# Patient Record
Sex: MALE | Race: BLACK OR AFRICAN AMERICAN | NOT HISPANIC OR LATINO | Employment: PART TIME | URBAN - METROPOLITAN AREA
[De-identification: names, ages, dates, MRNs, and addresses within clinical notes are randomized per-mention and may not be internally consistent; named-entity substitution may affect disease eponyms.]

---

## 2017-10-16 ENCOUNTER — OFFICE VISIT (OUTPATIENT)
Dept: CARDIOLOGY | Facility: CLINIC | Age: 62
End: 2017-10-16

## 2017-10-16 VITALS
DIASTOLIC BLOOD PRESSURE: 66 MMHG | SYSTOLIC BLOOD PRESSURE: 124 MMHG | BODY MASS INDEX: 27.82 KG/M2 | HEIGHT: 65 IN | HEART RATE: 86 BPM | WEIGHT: 167 LBS

## 2017-10-16 DIAGNOSIS — I25.118 CORONARY ARTERY DISEASE OF NATIVE ARTERY OF NATIVE HEART WITH STABLE ANGINA PECTORIS (HCC): Primary | ICD-10-CM

## 2017-10-16 DIAGNOSIS — I10 ESSENTIAL HYPERTENSION: ICD-10-CM

## 2017-10-16 DIAGNOSIS — E78.5 DYSLIPIDEMIA: ICD-10-CM

## 2017-10-16 DIAGNOSIS — E11.65 TYPE 2 DIABETES MELLITUS WITH HYPERGLYCEMIA, WITHOUT LONG-TERM CURRENT USE OF INSULIN (HCC): ICD-10-CM

## 2017-10-16 PROBLEM — R94.31 ABNORMAL EKG: Status: ACTIVE | Noted: 2017-10-16

## 2017-10-16 PROCEDURE — 99214 OFFICE O/P EST MOD 30 MIN: CPT | Performed by: INTERNAL MEDICINE

## 2017-10-16 RX ORDER — NEBIVOLOL 5 MG/1
5 TABLET ORAL DAILY
COMMUNITY

## 2017-10-16 RX ORDER — GLIMEPIRIDE 4 MG/1
4 TABLET ORAL
COMMUNITY

## 2017-10-16 RX ORDER — LISINOPRIL 10 MG/1
10 TABLET ORAL DAILY
COMMUNITY
End: 2018-10-09 | Stop reason: HOSPADM

## 2017-10-16 RX ORDER — ATORVASTATIN CALCIUM 40 MG/1
40 TABLET, FILM COATED ORAL DAILY
COMMUNITY
End: 2018-10-09 | Stop reason: HOSPADM

## 2017-10-16 NOTE — PROGRESS NOTES
"        Encounter Date:10/16/2017      Patient ID: Judah Cerna is a 61 y.o. male.    Chief Complaint: Coronary Artery Disease    PROBLEM LIST:  1. Coronary artery disease:  a.  History of \"small heart attack,\" in 1999.  b. Subsequent cardiac catheterization study followed by 2 coronary stents; incomplete database.  c. Had several stress tests with his primary care physician during 1133-7442, which were reportedly unremarkable.  d.  Recurrent atypical chest pain, fall 2014.  e. Cardiolite stress test, 09/23/2014, was normal with excellent/above average exercise capacity and ejection fraction 55%.  f. Echocardiogram, 09/23/2014, showed ejection fraction 65% with mild mitral and mild  tricuspid regurgitation.   g. Now presents with recurrent chest pain (October 2017)  2. Hypertension.   3. Dyslipidemia.   4. Type 2 diabetes.   5. Remote appendectomy with partial bowel resection due to ruptured appendix.     History of Present Illness  Patient presents today for follow-up with a history of CAD and cardiac risk factors.he has been doing well except for a right sided chest pain exacerbated with exertion. Pain is relieved by rest. EKG last week with PCP was unremarkable and he was advised to see us for further evaluation.  Denies shortness of breath, leg swelling, palpitations, and syncope. Remains busy and active running daily, limited by symptoms.  States that he is used to running for a long distance and now just after half a mile he has to limit herself due to right-sided chest pain.  No pain at rest.  Had recent bronchitis which has now resolved, denies pleuritic pain.  Works as an special education educator.    No Known Allergies      Current Outpatient Prescriptions:   •  aspirin 81 MG tablet, Take 81 mg by mouth Daily., Disp: , Rfl:   •  atorvastatin (LIPITOR) 40 MG tablet, Take 40 mg by mouth Daily., Disp: , Rfl:   •  glimepiride (AMARYL) 4 MG tablet, Take 4 mg by mouth Every Morning Before Breakfast., Disp: " ", Rfl:   •  lisinopril (PRINIVIL,ZESTRIL) 10 MG tablet, Take 10 mg by mouth Daily., Disp: , Rfl:   •  metFORMIN (GLUCOPHAGE) 1000 MG tablet, Take 1,000 mg by mouth Daily With Breakfast., Disp: , Rfl:   •  nebivolol (BYSTOLIC) 5 MG tablet, Take 5 mg by mouth Daily., Disp: , Rfl:     The following portions of the patient's history were reviewed and updated as appropriate: allergies, current medications, past family history, past medical history, past social history, past surgical history and problem list.    ROS  Review of Systems   Constitution: Negative for chills, fever, weight gain and weight loss.   Cardiovascular: Negative for chest pain, claudication, dyspnea on exertion, leg swelling, orthopnea, palpitations, paroxysmal nocturnal dyspnea and syncope.        No dizziness   Gastrointestinal: Negative for abdominal pain, constipation, diarrhea, nausea and vomiting.   Genitourinary:        No urinary symptoms   Neurological:        No symptoms of stroke.   All other systems reviewed and are negative.    Objective:     Blood pressure 124/66, pulse 86, height 65\" (165.1 cm), weight 167 lb (75.8 kg).      Physical Exam  Constitutional: She appears well-developed and well-nourished.   HENT:   HEENT exam unremarkable.   Neck: Neck supple. No JVD present.   No carotid bruits.   Cardiovascular: Normal rate, regular rhythm and normal heart sounds.    No murmur heard.  2 plus symmetric pulses.   Pulmonary/Chest: Breath sounds normal. Does not exhibit tenderness.   Abdominal:   Abdomen benign.   Musculoskeletal: Does not exhibit edema.   Neurological:   Neurological exam unremarkable.   Vitals reviewed.    Lab Review:   Procedures   EKG from PCP reviewed, this shows sinus rhythm with inferior T-wave inversions/nonspecific changes.  Assessment:      Diagnosis Plan   1. Coronary artery disease of native artery of native heart with stable angina pectoris , Abnormal baseline ECG.   Stress Test With Myocardial Perfusion One Day "   2. Essential hypertension , Well controlled     3. Dyslipidemia On Lipitor     4. Type 2 diabetes mellitus with hyperglycemia, without long-term current use of insulin, Managed by PCP.        Plan:   Exercise/cartilage stress test for further evaluation.  Continue current medications.   FU in 6 weeks, sooner as needed.  Thank you for allowing us to participate in the care of your patient.     Kay Stevens MD, FACC, [facility]        Please note that portions of this note may have been completed with a voice recognition program. Efforts were made to edit the dictations, but occasionally words are mistranscribed.

## 2017-10-31 ENCOUNTER — HOSPITAL ENCOUNTER (OUTPATIENT)
Dept: CARDIOLOGY | Facility: HOSPITAL | Age: 62
Discharge: HOME | End: 2017-10-31
Attending: INTERNAL MEDICINE

## 2017-10-31 ENCOUNTER — HOSPITAL ENCOUNTER (OUTPATIENT)
Dept: CARDIOLOGY | Facility: HOSPITAL | Age: 62
Discharge: HOME | End: 2017-10-31
Attending: INTERNAL MEDICINE | Admitting: INTERNAL MEDICINE

## 2017-10-31 VITALS
WEIGHT: 166 LBS | BODY MASS INDEX: 27.66 KG/M2 | SYSTOLIC BLOOD PRESSURE: 130 MMHG | DIASTOLIC BLOOD PRESSURE: 80 MMHG | HEIGHT: 65 IN | HEART RATE: 60 BPM

## 2017-10-31 LAB
BH CV STRESS BP STAGE 1: NORMAL
BH CV STRESS BP STAGE 2: NORMAL
BH CV STRESS DURATION MIN STAGE 1: 3
BH CV STRESS DURATION MIN STAGE 2: 3
BH CV STRESS DURATION MIN STAGE 3: 3
BH CV STRESS DURATION MIN STAGE 4: 1
BH CV STRESS DURATION SEC STAGE 1: 0
BH CV STRESS DURATION SEC STAGE 2: 0
BH CV STRESS DURATION SEC STAGE 3: 0
BH CV STRESS DURATION SEC STAGE 4: 15
BH CV STRESS GRADE STAGE 1: 10
BH CV STRESS GRADE STAGE 2: 12
BH CV STRESS GRADE STAGE 3: 14
BH CV STRESS GRADE STAGE 4: 16
BH CV STRESS HR STAGE 1: 110
BH CV STRESS HR STAGE 2: 136
BH CV STRESS HR STAGE 3: 148
BH CV STRESS HR STAGE 4: 157
BH CV STRESS METS STAGE 1: 5
BH CV STRESS METS STAGE 2: 7.5
BH CV STRESS METS STAGE 3: 10
BH CV STRESS METS STAGE 4: 13.5
BH CV STRESS PROTOCOL 1: NORMAL
BH CV STRESS RECOVERY BP: NORMAL MMHG
BH CV STRESS RECOVERY HR: 80 BPM
BH CV STRESS SPEED STAGE 1: 1.7
BH CV STRESS SPEED STAGE 2: 2.5
BH CV STRESS SPEED STAGE 3: 3.4
BH CV STRESS SPEED STAGE 4: 4.2
BH CV STRESS STAGE 1: 1
BH CV STRESS STAGE 2: 2
BH CV STRESS STAGE 3: 3
BH CV STRESS STAGE 4: 4
LV EF NUC BP: 65 %
MAXIMAL PREDICTED HEART RATE: 160 BPM
PERCENT MAX PREDICTED HR: 98.13 %
STRESS BASELINE BP: NORMAL MMHG
STRESS BASELINE HR: 60 BPM
STRESS PERCENT HR: 115 %
STRESS POST ESTIMATED WORKLOAD: 12.1 METS
STRESS POST EXERCISE DUR MIN: 10 MIN
STRESS POST EXERCISE DUR SEC: 15 SEC
STRESS POST PEAK BP: NORMAL MMHG
STRESS POST PEAK HR: 157 BPM
STRESS TARGET HR: 136 BPM

## 2017-10-31 PROCEDURE — 0 TECHNETIUM SESTAMIBI: Performed by: INTERNAL MEDICINE

## 2017-10-31 PROCEDURE — 78452 HT MUSCLE IMAGE SPECT MULT: CPT | Performed by: INTERNAL MEDICINE

## 2017-10-31 PROCEDURE — 78452 HT MUSCLE IMAGE SPECT MULT: CPT

## 2017-10-31 PROCEDURE — 93018 CV STRESS TEST I&R ONLY: CPT | Performed by: INTERNAL MEDICINE

## 2017-10-31 PROCEDURE — A9500 TC99M SESTAMIBI: HCPCS | Performed by: INTERNAL MEDICINE

## 2017-10-31 PROCEDURE — 93017 CV STRESS TEST TRACING ONLY: CPT

## 2017-10-31 RX ADMIN — TECHNETIUM TC-99M SESTAMIBI 1 DOSE: 1 INJECTION INTRAVENOUS at 09:55

## 2017-10-31 RX ADMIN — TECHNETIUM TC-99M SESTAMIBI 1 DOSE: 1 INJECTION INTRAVENOUS at 08:20

## 2018-06-13 ENCOUNTER — OFFICE VISIT (OUTPATIENT)
Dept: CARDIOLOGY | Facility: CLINIC | Age: 63
End: 2018-06-13

## 2018-06-13 VITALS
DIASTOLIC BLOOD PRESSURE: 64 MMHG | HEART RATE: 63 BPM | BODY MASS INDEX: 26.66 KG/M2 | SYSTOLIC BLOOD PRESSURE: 113 MMHG | WEIGHT: 160 LBS | HEIGHT: 65 IN

## 2018-06-13 DIAGNOSIS — I25.118 CORONARY ARTERY DISEASE OF NATIVE ARTERY OF NATIVE HEART WITH STABLE ANGINA PECTORIS (HCC): Primary | ICD-10-CM

## 2018-06-13 DIAGNOSIS — I10 ESSENTIAL HYPERTENSION: ICD-10-CM

## 2018-06-13 DIAGNOSIS — E78.5 DYSLIPIDEMIA: ICD-10-CM

## 2018-06-13 PROCEDURE — 99213 OFFICE O/P EST LOW 20 MIN: CPT | Performed by: INTERNAL MEDICINE

## 2018-06-13 RX ORDER — HYDROCODONE BITARTRATE AND ACETAMINOPHEN 5; 325 MG/1; MG/1
1 TABLET ORAL 2 TIMES DAILY PRN
COMMUNITY

## 2018-06-13 RX ORDER — ALPRAZOLAM 0.5 MG/1
0.5 TABLET ORAL DAILY PRN
COMMUNITY
Start: 2007-08-17

## 2018-06-13 NOTE — PROGRESS NOTES
"        Encounter Date:06/13/2018      Patient ID: Judah Cerna is a 62 y.o. male.    Chief Complaint: Coronary Artery Disease      PROBLEM LIST:  1. Coronary artery disease:  a.  History of \"small heart attack,\" in 1999.  b. Subsequent cardiac catheterization study followed by 2 coronary stents; incomplete database.  c. Had several stress tests with his primary care physician during [id number], which were reportedly unremarkable.  d.  Recurrent atypical chest pain, fall 2014.  e. Cardiolite stress test, 09/23/2014, was normal with excellent/above average exercise capacity and ejection fraction 55%.  f. Echocardiogram, 09/23/2014, showed ejection fraction 65% with mild mitral and mild  tricuspid regurgitation.   g. Now presents with recurrent chest pain (October 2017)  h. MPS 10/31/2017: Excellent exercise capacity with normal hemodynamic response to exercise. Expected exercise time 8:50. Actual exercise 10:15. THR achieved at 5:33. DOROTEO -15. 98% of MPHR. Negative treadmill stress test for anginal chest pain. Abnormal stress ECG showing ischemic ST segment depressions in inferior and anterolateral leads which persisted beyond 6 minutes of recovery. Myocardial perfusion imaging indicates a small-to-medium-sized infarct located in the apex with no significant ischemia noted. Left ventricular ejection fraction is normal (Calculated EF = 65%).  2. Hypertension.   3. Dyslipidemia.   4. Type 2 diabetes.   5. Remote appendectomy with partial bowel resection due to ruptured appendix.     History of Present Illness  Patient presents today for follow-up with a history of CAD and cardiac risk factors. Since last visit has been doing well from a cardiac standpoint. Reoprts that his diabetes is being well controlled. Denies smoking cigarettes and alcohol Denies chest pain, shortness of breath, leg swelling, palpitations, and syncope. Remains busy and active with walking and jogging 4-5 miles with no limitations.    No Known " "Allergies      Current Outpatient Prescriptions:   •  ALPRAZolam (XANAX) 0.5 MG tablet, Take 0.5 mg by mouth As Needed., Disp: , Rfl:   •  aspirin 81 MG tablet, Take 81 mg by mouth Daily., Disp: , Rfl:   •  atorvastatin (LIPITOR) 40 MG tablet, Take 40 mg by mouth Daily., Disp: , Rfl:   •  glimepiride (AMARYL) 4 MG tablet, Take 4 mg by mouth Every Morning Before Breakfast., Disp: , Rfl:   •  HYDROcodone-acetaminophen (NORCO) 5-325 MG per tablet, Take 1 tablet by mouth As Needed., Disp: , Rfl:   •  lisinopril (PRINIVIL,ZESTRIL) 10 MG tablet, Take 10 mg by mouth Daily., Disp: , Rfl:   •  metFORMIN (GLUCOPHAGE) 1000 MG tablet, Take 1,000 mg by mouth Daily With Breakfast., Disp: , Rfl:   •  nebivolol (BYSTOLIC) 5 MG tablet, Take 5 mg by mouth Daily., Disp: , Rfl:     The following portions of the patient's history were reviewed and updated as appropriate: allergies, current medications, past family history, past medical history, past social history, past surgical history and problem list.    ROS  Review of Systems   Constitution: Negative for chills, fever, weight gain and weight loss.   Cardiovascular: Negative for chest pain, claudication, dyspnea on exertion, leg swelling, orthopnea, palpitations, paroxysmal nocturnal dyspnea and syncope.        No dizziness   Gastrointestinal: Negative for abdominal pain, constipation, diarrhea, nausea and vomiting.   Genitourinary:        No urinary symptoms   Neurological:        No symptoms of stroke.   All other systems reviewed and are negative.    Objective:     Blood pressure 113/64, pulse 63, height 165.1 cm (65\"), weight 72.6 kg (160 lb).        Physical Exam  Constitutional: She appears well-developed and well-nourished.   HENT:   HEENT exam unremarkable.   Neck: Neck supple. No JVD present.   No carotid bruits.   Cardiovascular: Normal rate, regular rhythm and normal heart sounds.    No murmur heard.  2 plus symmetric pulses.   Pulmonary/Chest: Breath sounds normal. Does " not exhibit tenderness.   Abdominal:   Abdomen benign.   Musculoskeletal: Does not exhibit edema.   Neurological:   Neurological exam unremarkable.   Vitals reviewed.    Lab Review:   Procedures       Assessment:      Diagnosis Plan   1. Coronary artery disease of native artery of native heart with stable angina pectoris  Stable with no agina   2. Essential hypertension  Well controlled with current medication regimen.   3. Dyslipidemia. LDL goal < 70. Continue Lipitor 40 mg daily.     Plan:   Stable cardiac status  Continue current medications.   FU in 12 MO, sooner as needed.  Thank you for allowing us to participate in the care of your patient.     Scribed for Kay Stevens MD by Jb Morrison. 6/13/2018  11:10 AM    I, Kay Stevens MD, personally performed the services described in this documentation as scribed by the above named individual in my presence, and it is both accurate and complete.  6/13/2018  11:41 AM        Please note that portions of this note may have been completed with a voice recognition program. Efforts were made to edit the dictations, but occasionally words are mistranscribed.

## 2018-09-19 ENCOUNTER — TELEPHONE (OUTPATIENT)
Dept: CARDIOLOGY | Facility: CLINIC | Age: 63
End: 2018-09-19

## 2018-09-19 NOTE — TELEPHONE ENCOUNTER
"Patient states he is having pain in his \"right side\" and \"right chest\" after jogging over three miles.  States he used to be able to jog over four miles with no issues.  He wants to know if Dr. Stevens will order a chest xray.  Denies SOA.  Has not BP/HR readings to report.  Patient will f/u with PCP to be evaluated, can consider sooner appt after.    Patient verbalized understanding.  "

## 2018-09-24 ENCOUNTER — HOSPITAL ENCOUNTER (EMERGENCY)
Facility: HOSPITAL | Age: 63
Discharge: HOME | End: 2018-09-25
Attending: EMERGENCY MEDICINE | Admitting: EMERGENCY MEDICINE

## 2018-09-24 DIAGNOSIS — I10 ESSENTIAL HYPERTENSION: ICD-10-CM

## 2018-09-24 DIAGNOSIS — R07.89 ATYPICAL CHEST PAIN: Primary | ICD-10-CM

## 2018-09-24 DIAGNOSIS — Z86.79 HISTORY OF CORONARY ARTERY DISEASE: ICD-10-CM

## 2018-09-24 DIAGNOSIS — E11.9 TYPE 2 DIABETES MELLITUS WITHOUT COMPLICATION, WITHOUT LONG-TERM CURRENT USE OF INSULIN (HCC): ICD-10-CM

## 2018-09-24 LAB
ALBUMIN SERPL-MCNC: 4.46 G/DL (ref 3.2–4.8)
ALBUMIN/GLOB SERPL: 2 G/DL (ref 1.5–2.5)
ALP SERPL-CCNC: 66 U/L (ref 25–100)
ALT SERPL W P-5'-P-CCNC: 23 U/L (ref 7–40)
ANION GAP SERPL CALCULATED.3IONS-SCNC: 12 MMOL/L (ref 3–11)
AST SERPL-CCNC: 22 U/L (ref 0–33)
BASOPHILS # BLD AUTO: 0.01 10*3/MM3 (ref 0–0.2)
BASOPHILS NFR BLD AUTO: 0.1 % (ref 0–1)
BILIRUB SERPL-MCNC: 1.6 MG/DL (ref 0.3–1.2)
BNP SERPL-MCNC: <2 PG/ML (ref 0–100)
BUN BLD-MCNC: 12 MG/DL (ref 9–23)
BUN/CREAT SERPL: 12.6 (ref 7–25)
CALCIUM SPEC-SCNC: 9.3 MG/DL (ref 8.7–10.4)
CHLORIDE SERPL-SCNC: 106 MMOL/L (ref 99–109)
CO2 SERPL-SCNC: 27 MMOL/L (ref 20–31)
CREAT BLD-MCNC: 0.95 MG/DL (ref 0.6–1.3)
DEPRECATED RDW RBC AUTO: 60.6 FL (ref 37–54)
EOSINOPHIL # BLD AUTO: 0.33 10*3/MM3 (ref 0–0.3)
EOSINOPHIL NFR BLD AUTO: 4.3 % (ref 0–3)
ERYTHROCYTE [DISTWIDTH] IN BLOOD BY AUTOMATED COUNT: 15.9 % (ref 11.3–14.5)
GFR SERPL CREATININE-BSD FRML MDRD: 98 ML/MIN/1.73
GLOBULIN UR ELPH-MCNC: 2.2 GM/DL
GLUCOSE BLD-MCNC: 124 MG/DL (ref 70–100)
HCT VFR BLD AUTO: 33.3 % (ref 38.9–50.9)
HGB BLD-MCNC: 11.4 G/DL (ref 13.1–17.5)
HOLD SPECIMEN: NORMAL
HOLD SPECIMEN: NORMAL
IMM GRANULOCYTES # BLD: 0.03 10*3/MM3 (ref 0–0.03)
IMM GRANULOCYTES NFR BLD: 0.4 % (ref 0–0.6)
LIPASE SERPL-CCNC: 55 U/L (ref 6–51)
LYMPHOCYTES # BLD AUTO: 2.2 10*3/MM3 (ref 0.6–4.8)
LYMPHOCYTES NFR BLD AUTO: 28.8 % (ref 24–44)
MCH RBC QN AUTO: 36.4 PG (ref 27–31)
MCHC RBC AUTO-ENTMCNC: 34.2 G/DL (ref 32–36)
MCV RBC AUTO: 106.4 FL (ref 80–99)
MONOCYTES # BLD AUTO: 0.34 10*3/MM3 (ref 0–1)
MONOCYTES NFR BLD AUTO: 4.5 % (ref 0–12)
NEUTROPHILS # BLD AUTO: 4.72 10*3/MM3 (ref 1.5–8.3)
NEUTROPHILS NFR BLD AUTO: 61.9 % (ref 41–71)
PLATELET # BLD AUTO: 302 10*3/MM3 (ref 150–450)
PMV BLD AUTO: 10.2 FL (ref 6–12)
POTASSIUM BLD-SCNC: 3.9 MMOL/L (ref 3.5–5.5)
PROT SERPL-MCNC: 6.7 G/DL (ref 5.7–8.2)
RBC # BLD AUTO: 3.13 10*6/MM3 (ref 4.2–5.76)
SODIUM BLD-SCNC: 145 MMOL/L (ref 132–146)
TROPONIN I SERPL-MCNC: 0 NG/ML (ref 0–0.07)
TROPONIN I SERPL-MCNC: 0 NG/ML (ref 0–0.07)
WBC NRBC COR # BLD: 7.63 10*3/MM3 (ref 3.5–10.8)
WHOLE BLOOD HOLD SPECIMEN: NORMAL
WHOLE BLOOD HOLD SPECIMEN: NORMAL

## 2018-09-24 PROCEDURE — 93005 ELECTROCARDIOGRAM TRACING: CPT

## 2018-09-24 PROCEDURE — 80053 COMPREHEN METABOLIC PANEL: CPT

## 2018-09-24 PROCEDURE — 85025 COMPLETE CBC W/AUTO DIFF WBC: CPT

## 2018-09-24 PROCEDURE — 84484 ASSAY OF TROPONIN QUANT: CPT

## 2018-09-24 PROCEDURE — 83880 ASSAY OF NATRIURETIC PEPTIDE: CPT

## 2018-09-24 PROCEDURE — 99285 EMERGENCY DEPT VISIT HI MDM: CPT

## 2018-09-24 PROCEDURE — 93005 ELECTROCARDIOGRAM TRACING: CPT | Performed by: EMERGENCY MEDICINE

## 2018-09-24 PROCEDURE — 83690 ASSAY OF LIPASE: CPT

## 2018-09-24 RX ORDER — SODIUM CHLORIDE 0.9 % (FLUSH) 0.9 %
10 SYRINGE (ML) INJECTION AS NEEDED
Status: DISCONTINUED | OUTPATIENT
Start: 2018-09-24 | End: 2018-09-25 | Stop reason: HOSPADM

## 2018-09-24 RX ORDER — ASPIRIN 81 MG/1
324 TABLET, CHEWABLE ORAL ONCE
Status: COMPLETED | OUTPATIENT
Start: 2018-09-24 | End: 2018-09-25

## 2018-09-25 ENCOUNTER — APPOINTMENT (OUTPATIENT)
Dept: GENERAL RADIOLOGY | Facility: HOSPITAL | Age: 63
End: 2018-09-25

## 2018-09-25 VITALS
HEART RATE: 66 BPM | RESPIRATION RATE: 18 BRPM | OXYGEN SATURATION: 99 % | BODY MASS INDEX: 26.66 KG/M2 | SYSTOLIC BLOOD PRESSURE: 126 MMHG | DIASTOLIC BLOOD PRESSURE: 78 MMHG | WEIGHT: 160 LBS | HEIGHT: 65 IN | TEMPERATURE: 98.8 F

## 2018-09-25 LAB — TROPONIN I SERPL-MCNC: 0 NG/ML (ref 0–0.07)

## 2018-09-25 PROCEDURE — 25010000002 KETOROLAC TROMETHAMINE PER 15 MG: Performed by: PHYSICIAN ASSISTANT

## 2018-09-25 PROCEDURE — 96374 THER/PROPH/DIAG INJ IV PUSH: CPT

## 2018-09-25 PROCEDURE — 71045 X-RAY EXAM CHEST 1 VIEW: CPT

## 2018-09-25 PROCEDURE — 93005 ELECTROCARDIOGRAM TRACING: CPT | Performed by: PHYSICIAN ASSISTANT

## 2018-09-25 PROCEDURE — 84484 ASSAY OF TROPONIN QUANT: CPT

## 2018-09-25 RX ORDER — KETOROLAC TROMETHAMINE 15 MG/ML
15 INJECTION, SOLUTION INTRAMUSCULAR; INTRAVENOUS ONCE
Status: COMPLETED | OUTPATIENT
Start: 2018-09-25 | End: 2018-09-25

## 2018-09-25 RX ADMIN — KETOROLAC TROMETHAMINE 15 MG: 15 INJECTION, SOLUTION INTRAMUSCULAR; INTRAVENOUS at 00:35

## 2018-09-25 RX ADMIN — ASPIRIN 81 MG 324 MG: 81 TABLET ORAL at 00:35

## 2018-09-25 NOTE — DISCHARGE INSTRUCTIONS
Cardiac workup was within normal limits with normal EKG, 2 normal troponins, and chest x-ray was also within normal limits.  Patient has had no chest discomfort throughout the entire ER evaluation.  Due to exertional component, recommend close follow-up with the chest pain clinic.  Rx for diclofenac 3 times a day as needed for pain/inflammation.  Continue with all other current medical management.  Return if worsening symptoms.

## 2018-09-25 NOTE — ED PROVIDER NOTES
Subjective   This is a 62-year-old -American male that presents to the ER with intermittent chest discomfort that started approximately 2 weeks ago.  Patient says that he is very active and exercises 3-4 days a week.  He walks/jogs for 1 hour.  At 2 different intervals over the last 2 weeks, he started having aching pain in his chest that occurred while walking.  He describes the pain as aching to the right chest initially with intermittent sharp pains.  This lasted at least 20-30 minutes while walking.  The last time it occurred was 4 days ago while walking, and the second time it happened on the left chest.  There was no radiation of discomfort to the neck, jaw, or upper extremities.  He denied any associated nausea/vomiting, or diaphoresis.  He did report some mild shortness of breath.  He denies any recent cough or congestion.  He is a nonsmoker.  He does have history of coronary artery disease with 2 previous stents approximately 18 years ago.  His cardiologist is Dr. Stevens.  His last stress test was October, 2018.  EF was 65% and there was no evidence of cardiac ischemia.  Patient is a non-smoker.  He has type 2 diabetes mellitus but is on oral medications only.        History provided by:  Patient  Chest Pain   Pain location:  L chest and R chest  Pain quality: aching    Pain quality comment:  One episode while walking occurred during right chest and another time, it occured to left chest.  No radiation to neck or jaw.  Intermittent sharp pains, as well.  Pain radiates to:  Does not radiate  Pain severity:  Moderate  Onset quality:  Sudden  Duration: 20-30.  Timing:  Constant  Progression:  Resolved (resolved after patient stopped walking.)  Chronicity:  Recurrent (Occurred during walking)  Context: not breathing and not movement    Context comment:  Patient reported aching in chest with exertion that occurred twice in the last 2 weeks.  He usually walks/jogs 3-4 days a week for one hour.  At two times  over the last 2 weeks, he has had aching to right and left side of chest with walking.    Relieved by:  Rest  Worsened by:  Nothing  Ineffective treatments:  None tried  Associated symptoms: shortness of breath    Associated symptoms: no abdominal pain, no anorexia, no anxiety, no back pain, no claudication, no cough, no diaphoresis, no fatigue, no headache, no nausea, no near-syncope, no palpitations, no syncope and no vomiting        Review of Systems   Constitutional: Negative for diaphoresis and fatigue.   HENT: Negative.    Respiratory: Positive for shortness of breath. Negative for cough, chest tightness and wheezing.    Cardiovascular: Positive for chest pain. Negative for palpitations, claudication, syncope and near-syncope.   Gastrointestinal: Negative for abdominal pain, anorexia, nausea and vomiting.   Musculoskeletal: Negative for back pain.   Neurological: Negative for headaches.       Past Medical History:   Diagnosis Date   • CAD (coronary artery disease)    • Dyslipidemia    • Heart attack    • Hyperlipidemia    • Hypertension    • Type 2 diabetes mellitus (CMS/HCC)        No Known Allergies    Past Surgical History:   Procedure Laterality Date   • APPENDECTOMY      w/ partial bowel resection due to ruptured appendix   • CARDIAC CATHETERIZATION     • CORONARY STENT PLACEMENT      x 2       History reviewed. No pertinent family history.    Social History     Social History   • Marital status:      Social History Main Topics   • Smoking status: Never Smoker   • Smokeless tobacco: Never Used   • Alcohol use No   • Drug use: No   • Sexual activity: Defer     Other Topics Concern   • Not on file           Objective   Physical Exam   Constitutional: He is oriented to person, place, and time. He appears well-developed and well-nourished. No distress.   HENT:   Head: Normocephalic and atraumatic.   Mouth/Throat: Oropharynx is clear and moist.   Eyes: Pupils are equal, round, and reactive to light.  Conjunctivae and EOM are normal. No scleral icterus.   Neck: Normal range of motion. Neck supple.   Cardiovascular: Normal rate, regular rhythm, normal heart sounds, intact distal pulses and normal pulses.   No extrasystoles are present.   No pedal edema to BLE   Pulmonary/Chest: Effort normal and breath sounds normal. No tachypnea. No respiratory distress. He has no decreased breath sounds. He has no wheezes. He exhibits tenderness. He exhibits no crepitus.       Abdominal: Soft. He exhibits no distension. There is no tenderness.   Musculoskeletal: Normal range of motion. He exhibits no edema.   Lymphadenopathy:     He has no cervical adenopathy.   Neurological: He is alert and oriented to person, place, and time.   Skin: Skin is warm and dry. He is not diaphoretic.   Psychiatric: He has a normal mood and affect. His behavior is normal.   Nursing note and vitals reviewed.      Procedures           ED Course  ED Course as of Sep 25 0155   Tue Sep 25, 2018   0116 CBC and chemistries were within normal limits.  BNP was less than 2.  EKG showed normal sinus rhythm and there was no acute ST-T wave changes consistent with ischemia.  Troponins ×2 sets were 0.00.  Patient denies any chest discomfort throughout the entire ER evaluation.  With intermittent exertional chest discomfort, recommend close follow-up at the chest pain clinic.  Heart score is 4.  [FC]   0134 Chest x-ray shows no active disease.  Vital signs are stable, and patient is ready for discharge to home.  [FC]      ED Course User Index  [FC] Moni Paulino, ANY      Recent Results (from the past 24 hour(s))   Comprehensive Metabolic Panel    Collection Time: 09/24/18  8:23 PM   Result Value Ref Range    Glucose 124 (H) 70 - 100 mg/dL    BUN 12 9 - 23 mg/dL    Creatinine 0.95 0.60 - 1.30 mg/dL    Sodium 145 132 - 146 mmol/L    Potassium 3.9 3.5 - 5.5 mmol/L    Chloride 106 99 - 109 mmol/L    CO2 27.0 20.0 - 31.0 mmol/L    Calcium 9.3 8.7 - 10.4 mg/dL     Total Protein 6.7 5.7 - 8.2 g/dL    Albumin 4.46 3.20 - 4.80 g/dL    ALT (SGPT) 23 7 - 40 U/L    AST (SGOT) 22 0 - 33 U/L    Alkaline Phosphatase 66 25 - 100 U/L    Total Bilirubin 1.6 (H) 0.3 - 1.2 mg/dL    eGFR  African Amer 98 >60 mL/min/1.73    Globulin 2.2 gm/dL    A/G Ratio 2.0 1.5 - 2.5 g/dL    BUN/Creatinine Ratio 12.6 7.0 - 25.0    Anion Gap 12.0 (H) 3.0 - 11.0 mmol/L   Lipase    Collection Time: 09/24/18  8:23 PM   Result Value Ref Range    Lipase 55 (H) 6 - 51 U/L   BNP    Collection Time: 09/24/18  8:23 PM   Result Value Ref Range    BNP <2.0 0.0 - 100.0 pg/mL   Light Blue Top    Collection Time: 09/24/18  8:23 PM   Result Value Ref Range    Extra Tube hold for add-on    Green Top (Gel)    Collection Time: 09/24/18  8:23 PM   Result Value Ref Range    Extra Tube Hold for add-ons.    Lavender Top    Collection Time: 09/24/18  8:23 PM   Result Value Ref Range    Extra Tube hold for add-on    Gold Top - SST    Collection Time: 09/24/18  8:23 PM   Result Value Ref Range    Extra Tube Hold for add-ons.    CBC Auto Differential    Collection Time: 09/24/18  8:23 PM   Result Value Ref Range    WBC 7.63 3.50 - 10.80 10*3/mm3    RBC 3.13 (L) 4.20 - 5.76 10*6/mm3    Hemoglobin 11.4 (L) 13.1 - 17.5 g/dL    Hematocrit 33.3 (L) 38.9 - 50.9 %    .4 (H) 80.0 - 99.0 fL    MCH 36.4 (H) 27.0 - 31.0 pg    MCHC 34.2 32.0 - 36.0 g/dL    RDW 15.9 (H) 11.3 - 14.5 %    RDW-SD 60.6 (H) 37.0 - 54.0 fl    MPV 10.2 6.0 - 12.0 fL    Platelets 302 150 - 450 10*3/mm3    Neutrophil % 61.9 41.0 - 71.0 %    Lymphocyte % 28.8 24.0 - 44.0 %    Monocyte % 4.5 0.0 - 12.0 %    Eosinophil % 4.3 (H) 0.0 - 3.0 %    Basophil % 0.1 0.0 - 1.0 %    Immature Grans % 0.4 0.0 - 0.6 %    Neutrophils, Absolute 4.72 1.50 - 8.30 10*3/mm3    Lymphocytes, Absolute 2.20 0.60 - 4.80 10*3/mm3    Monocytes, Absolute 0.34 0.00 - 1.00 10*3/mm3    Eosinophils, Absolute 0.33 (H) 0.00 - 0.30 10*3/mm3    Basophils, Absolute 0.01 0.00 - 0.20 10*3/mm3    Immature  "Grans, Absolute 0.03 0.00 - 0.03 10*3/mm3   POC Troponin, Rapid    Collection Time: 09/24/18  8:29 PM   Result Value Ref Range    Troponin I 0.00 0.00 - 0.07 ng/mL   POC Troponin, Rapid    Collection Time: 09/24/18 11:04 PM   Result Value Ref Range    Troponin I 0.00 0.00 - 0.07 ng/mL   POC Troponin, Rapid    Collection Time: 09/25/18  1:12 AM   Result Value Ref Range    Troponin I 0.00 0.00 - 0.07 ng/mL     Note: In addition to lab results from this visit, the labs listed above may include labs taken at another facility or during a different encounter within the last 24 hours. Please correlate lab times with ED admission and discharge times for further clarification of the services performed during this visit.    XR Chest 1 View   Final Result     No acute findings visualized within the chest.      THIS DOCUMENT HAS BEEN ELECTRONICALLY SIGNED BY JESUS LOZOYA MD        Vitals:    09/24/18 2006 09/24/18 2258   BP: 133/66 119/76   Pulse: 86 58   Resp: 16 18   Temp: 98.8 °F (37.1 °C)    TempSrc: Oral    SpO2: 95% 100%   Weight: 72.6 kg (160 lb)    Height: 165.1 cm (65\")      Medications   sodium chloride 0.9 % flush 10 mL (not administered)   aspirin chewable tablet 324 mg (324 mg Oral Given 9/25/18 0035)   ketorolac (TORADOL) injection 15 mg (15 mg Intravenous Given 9/25/18 0035)     ECG/EMG Results (last 24 hours)     Procedure Component Value Units Date/Time    ECG 12 Lead [[id number]] Collected:  09/24/18 2018     Updated:  09/24/18 2018    ECG 12 Lead [[id number]] Collected:  09/24/18 2256     Updated:  09/24/18 2256                  HEART Score (for prediction of 6-week risk of major adverse cardiac event) reviewed and/or performed as part of the patient evaluation and treatment planning process.  The result associated with this review/performance is: 4       MDM      Final diagnoses:   Atypical chest pain   History of coronary artery disease   Type 2 diabetes mellitus without complication, without long-term current " use of insulin (CMS/[facility])   Essential hypertension            Moni Paulino, ANY  09/25/18 0155

## 2018-09-28 ENCOUNTER — APPOINTMENT (OUTPATIENT)
Dept: GENERAL RADIOLOGY | Facility: HOSPITAL | Age: 63
End: 2018-09-28

## 2018-09-28 ENCOUNTER — HOSPITAL ENCOUNTER (INPATIENT)
Facility: HOSPITAL | Age: 63
LOS: 11 days | Discharge: HOME | End: 2018-10-09
Attending: INTERNAL MEDICINE | Admitting: THORACIC SURGERY (CARDIOTHORACIC VASCULAR SURGERY)

## 2018-09-28 DIAGNOSIS — I25.110 CORONARY ARTERY DISEASE INVOLVING NATIVE CORONARY ARTERY OF NATIVE HEART WITH UNSTABLE ANGINA PECTORIS (HCC): ICD-10-CM

## 2018-09-28 DIAGNOSIS — Z74.09 IMPAIRED FUNCTIONAL MOBILITY, BALANCE, GAIT, AND ENDURANCE: ICD-10-CM

## 2018-09-28 DIAGNOSIS — I20.0 UNSTABLE ANGINA (HCC): Primary | ICD-10-CM

## 2018-09-28 LAB
ALBUMIN SERPL-MCNC: 4.39 G/DL (ref 3.2–4.8)
ALBUMIN/GLOB SERPL: 1.8 G/DL (ref 1.5–2.5)
ALP SERPL-CCNC: 67 U/L (ref 25–100)
ALT SERPL W P-5'-P-CCNC: 23 U/L (ref 7–40)
ANION GAP SERPL CALCULATED.3IONS-SCNC: 15 MMOL/L (ref 3–11)
AST SERPL-CCNC: 29 U/L (ref 0–33)
BASOPHILS # BLD AUTO: 0.01 10*3/MM3 (ref 0–0.2)
BASOPHILS NFR BLD AUTO: 0.1 % (ref 0–1)
BILIRUB SERPL-MCNC: 1.4 MG/DL (ref 0.3–1.2)
BUN BLD-MCNC: 12 MG/DL (ref 9–23)
BUN/CREAT SERPL: 12.8 (ref 7–25)
CALCIUM SPEC-SCNC: 9.3 MG/DL (ref 8.7–10.4)
CHLORIDE SERPL-SCNC: 100 MMOL/L (ref 99–109)
CO2 SERPL-SCNC: 24 MMOL/L (ref 20–31)
CREAT BLD-MCNC: 0.94 MG/DL (ref 0.6–1.3)
D DIMER PPP FEU-MCNC: 0.33 MG/L (FEU) (ref 0–0.5)
DEPRECATED RDW RBC AUTO: 64.6 FL (ref 37–54)
EOSINOPHIL # BLD AUTO: 0.34 10*3/MM3 (ref 0–0.3)
EOSINOPHIL NFR BLD AUTO: 3.9 % (ref 0–3)
ERYTHROCYTE [DISTWIDTH] IN BLOOD BY AUTOMATED COUNT: 16.8 % (ref 11.3–14.5)
GFR SERPL CREATININE-BSD FRML MDRD: 99 ML/MIN/1.73
GLOBULIN UR ELPH-MCNC: 2.4 GM/DL
GLUCOSE BLD-MCNC: 97 MG/DL (ref 70–100)
GLUCOSE BLDC GLUCOMTR-MCNC: 90 MG/DL (ref 70–130)
HCT VFR BLD AUTO: 35.8 % (ref 38.9–50.9)
HGB BLD-MCNC: 12.2 G/DL (ref 13.1–17.5)
HOLD SPECIMEN: NORMAL
HOLD SPECIMEN: NORMAL
IMM GRANULOCYTES # BLD: 0.02 10*3/MM3 (ref 0–0.03)
IMM GRANULOCYTES NFR BLD: 0.2 % (ref 0–0.6)
INR PPP: 0.96 (ref 0.91–1.09)
LYMPHOCYTES # BLD AUTO: 2.56 10*3/MM3 (ref 0.6–4.8)
LYMPHOCYTES NFR BLD AUTO: 29.6 % (ref 24–44)
MCH RBC QN AUTO: 36.4 PG (ref 27–31)
MCHC RBC AUTO-ENTMCNC: 34.1 G/DL (ref 32–36)
MCV RBC AUTO: 106.9 FL (ref 80–99)
MONOCYTES # BLD AUTO: 0.54 10*3/MM3 (ref 0–1)
MONOCYTES NFR BLD AUTO: 6.2 % (ref 0–12)
NEUTROPHILS # BLD AUTO: 5.21 10*3/MM3 (ref 1.5–8.3)
NEUTROPHILS NFR BLD AUTO: 60.2 % (ref 41–71)
PLATELET # BLD AUTO: 360 10*3/MM3 (ref 150–450)
PMV BLD AUTO: 10.8 FL (ref 6–12)
POTASSIUM BLD-SCNC: 4.1 MMOL/L (ref 3.5–5.5)
PROT SERPL-MCNC: 6.8 G/DL (ref 5.7–8.2)
PROTHROMBIN TIME: 10.1 SECONDS (ref 9.6–11.5)
RBC # BLD AUTO: 3.35 10*6/MM3 (ref 4.2–5.76)
SODIUM BLD-SCNC: 139 MMOL/L (ref 132–146)
TROPONIN I SERPL-MCNC: 0.01 NG/ML (ref 0–0.07)
TROPONIN I SERPL-MCNC: <0.006 NG/ML
TROPONIN I SERPL-MCNC: <0.006 NG/ML
WBC NRBC COR # BLD: 8.66 10*3/MM3 (ref 3.5–10.8)
WHOLE BLOOD HOLD SPECIMEN: NORMAL
WHOLE BLOOD HOLD SPECIMEN: NORMAL

## 2018-09-28 PROCEDURE — 84484 ASSAY OF TROPONIN QUANT: CPT

## 2018-09-28 PROCEDURE — 99222 1ST HOSP IP/OBS MODERATE 55: CPT | Performed by: INTERNAL MEDICINE

## 2018-09-28 PROCEDURE — 99285 EMERGENCY DEPT VISIT HI MDM: CPT

## 2018-09-28 PROCEDURE — 93010 ELECTROCARDIOGRAM REPORT: CPT | Performed by: INTERNAL MEDICINE

## 2018-09-28 PROCEDURE — 93005 ELECTROCARDIOGRAM TRACING: CPT | Performed by: EMERGENCY MEDICINE

## 2018-09-28 PROCEDURE — 85379 FIBRIN DEGRADATION QUANT: CPT | Performed by: PHYSICIAN ASSISTANT

## 2018-09-28 PROCEDURE — 93005 ELECTROCARDIOGRAM TRACING: CPT | Performed by: INTERNAL MEDICINE

## 2018-09-28 PROCEDURE — G0378 HOSPITAL OBSERVATION PER HR: HCPCS

## 2018-09-28 PROCEDURE — 63710000001 INSULIN LISPRO (HUMAN) PER 5 UNITS: Performed by: PHYSICIAN ASSISTANT

## 2018-09-28 PROCEDURE — 25010000002 HEPARIN (PORCINE) PER 1000 UNITS: Performed by: PHYSICIAN ASSISTANT

## 2018-09-28 PROCEDURE — 84484 ASSAY OF TROPONIN QUANT: CPT | Performed by: EMERGENCY MEDICINE

## 2018-09-28 PROCEDURE — 71046 X-RAY EXAM CHEST 2 VIEWS: CPT

## 2018-09-28 PROCEDURE — 82962 GLUCOSE BLOOD TEST: CPT

## 2018-09-28 PROCEDURE — 85610 PROTHROMBIN TIME: CPT | Performed by: PHYSICIAN ASSISTANT

## 2018-09-28 PROCEDURE — 80053 COMPREHEN METABOLIC PANEL: CPT | Performed by: PHYSICIAN ASSISTANT

## 2018-09-28 PROCEDURE — 85025 COMPLETE CBC W/AUTO DIFF WBC: CPT | Performed by: PHYSICIAN ASSISTANT

## 2018-09-28 RX ORDER — HEPARIN SODIUM 5000 [USP'U]/ML
5000 INJECTION, SOLUTION INTRAVENOUS; SUBCUTANEOUS EVERY 8 HOURS SCHEDULED
Status: DISCONTINUED | OUTPATIENT
Start: 2018-09-28 | End: 2018-10-01

## 2018-09-28 RX ORDER — ATORVASTATIN CALCIUM 40 MG/1
80 TABLET, FILM COATED ORAL NIGHTLY
Status: DISCONTINUED | OUTPATIENT
Start: 2018-09-28 | End: 2018-09-29

## 2018-09-28 RX ORDER — CLOPIDOGREL BISULFATE 75 MG/1
600 TABLET ORAL ONCE
Status: COMPLETED | OUTPATIENT
Start: 2018-09-28 | End: 2018-09-28

## 2018-09-28 RX ORDER — ASPIRIN 81 MG/1
81 TABLET ORAL DAILY
Status: DISCONTINUED | OUTPATIENT
Start: 2018-09-29 | End: 2018-10-01

## 2018-09-28 RX ORDER — LISINOPRIL 10 MG/1
10 TABLET ORAL
Status: DISCONTINUED | OUTPATIENT
Start: 2018-09-29 | End: 2018-10-02

## 2018-09-28 RX ORDER — ASPIRIN 81 MG/1
324 TABLET, CHEWABLE ORAL ONCE
Status: COMPLETED | OUTPATIENT
Start: 2018-09-28 | End: 2018-09-28

## 2018-09-28 RX ORDER — SODIUM CHLORIDE 0.9 % (FLUSH) 0.9 %
1-10 SYRINGE (ML) INJECTION AS NEEDED
Status: DISCONTINUED | OUTPATIENT
Start: 2018-09-28 | End: 2018-10-02

## 2018-09-28 RX ORDER — SODIUM CHLORIDE 0.9 % (FLUSH) 0.9 %
10 SYRINGE (ML) INJECTION AS NEEDED
Status: DISCONTINUED | OUTPATIENT
Start: 2018-09-28 | End: 2018-10-02

## 2018-09-28 RX ORDER — DEXTROSE MONOHYDRATE 25 G/50ML
25 INJECTION, SOLUTION INTRAVENOUS
Status: DISCONTINUED | OUTPATIENT
Start: 2018-09-28 | End: 2018-10-03

## 2018-09-28 RX ORDER — ASPIRIN 81 MG/1
81 TABLET ORAL DAILY
Status: DISCONTINUED | OUTPATIENT
Start: 2018-09-28 | End: 2018-09-28

## 2018-09-28 RX ORDER — NEBIVOLOL 5 MG/1
5 TABLET ORAL
Status: DISCONTINUED | OUTPATIENT
Start: 2018-09-29 | End: 2018-10-02

## 2018-09-28 RX ORDER — CLOPIDOGREL BISULFATE 75 MG/1
75 TABLET ORAL DAILY
Status: DISCONTINUED | OUTPATIENT
Start: 2018-09-29 | End: 2018-09-29

## 2018-09-28 RX ORDER — NICOTINE POLACRILEX 4 MG
15 LOZENGE BUCCAL
Status: DISCONTINUED | OUTPATIENT
Start: 2018-09-28 | End: 2018-10-03

## 2018-09-28 RX ADMIN — CLOPIDOGREL BISULFATE 600 MG: 75 TABLET ORAL at 21:32

## 2018-09-28 RX ADMIN — ASPIRIN 81 MG 324 MG: 81 TABLET ORAL at 14:30

## 2018-09-28 RX ADMIN — NITROGLYCERIN 1 INCH: 20 OINTMENT TOPICAL at 14:30

## 2018-09-28 RX ADMIN — ATORVASTATIN CALCIUM 80 MG: 40 TABLET, FILM COATED ORAL at 21:09

## 2018-09-28 RX ADMIN — HEPARIN SODIUM 5000 UNITS: 5000 INJECTION, SOLUTION INTRAVENOUS; SUBCUTANEOUS at 21:32

## 2018-09-29 ENCOUNTER — APPOINTMENT (OUTPATIENT)
Dept: CARDIOLOGY | Facility: HOSPITAL | Age: 63
End: 2018-09-29
Attending: THORACIC SURGERY (CARDIOTHORACIC VASCULAR SURGERY)

## 2018-09-29 LAB
ANION GAP SERPL CALCULATED.3IONS-SCNC: 8 MMOL/L (ref 3–11)
ARTICHOKE IGE QN: 44 MG/DL (ref 0–130)
BH CV ECHO MEAS - BSA(HAYCOCK): 1.8 M^2
BH CV ECHO MEAS - BSA: 1.8 M^2
BH CV ECHO MEAS - BZI_BMI: 26.3 KILOGRAMS/M^2
BH CV ECHO MEAS - BZI_METRIC_HEIGHT: 165.1 CM
BH CV ECHO MEAS - BZI_METRIC_WEIGHT: 71.7 KG
BH CV XLRA MEAS LEFT DIST CCA EDV: 22.8 CM/SEC
BH CV XLRA MEAS LEFT DIST CCA PSV: 115.5 CM/SEC
BH CV XLRA MEAS LEFT DIST ICA EDV: 32.8 CM/SEC
BH CV XLRA MEAS LEFT DIST ICA PSV: 90.8 CM/SEC
BH CV XLRA MEAS LEFT ICA/CCA RATIO: 0.89
BH CV XLRA MEAS LEFT MID CCA EDV: 29.9 CM/SEC
BH CV XLRA MEAS LEFT MID CCA PSV: 144.6 CM/SEC
BH CV XLRA MEAS LEFT MID ICA EDV: 27.9 CM/SEC
BH CV XLRA MEAS LEFT MID ICA PSV: 82.4 CM/SEC
BH CV XLRA MEAS LEFT PROX CCA EDV: 29.9 CM/SEC
BH CV XLRA MEAS LEFT PROX CCA PSV: 151.6 CM/SEC
BH CV XLRA MEAS LEFT PROX ECA EDV: 8.6 CM/SEC
BH CV XLRA MEAS LEFT PROX ECA PSV: 69.1 CM/SEC
BH CV XLRA MEAS LEFT PROX ICA EDV: 25.1 CM/SEC
BH CV XLRA MEAS LEFT PROX ICA PSV: 103.4 CM/SEC
BH CV XLRA MEAS LEFT PROX SCLA EDV: 11.6 CM/SEC
BH CV XLRA MEAS LEFT PROX SCLA PSV: 92.1 CM/SEC
BH CV XLRA MEAS LEFT VERTEBRAL A EDV: 20.3 CM/SEC
BH CV XLRA MEAS LEFT VERTEBRAL A PSV: 48.2 CM/SEC
BH CV XLRA MEAS RIGHT DIST CCA EDV: 24.8 CM/SEC
BH CV XLRA MEAS RIGHT DIST CCA PSV: 90.4 CM/SEC
BH CV XLRA MEAS RIGHT DIST ICA EDV: 31.4 CM/SEC
BH CV XLRA MEAS RIGHT DIST ICA PSV: 94.8 CM/SEC
BH CV XLRA MEAS RIGHT ICA/CCA RATIO: 0.87
BH CV XLRA MEAS RIGHT MID CCA EDV: 28.1 CM/SEC
BH CV XLRA MEAS RIGHT MID CCA PSV: 94.3 CM/SEC
BH CV XLRA MEAS RIGHT MID ICA EDV: 24.3 CM/SEC
BH CV XLRA MEAS RIGHT MID ICA PSV: 77.7 CM/SEC
BH CV XLRA MEAS RIGHT PROX CCA EDV: 28.3 CM/SEC
BH CV XLRA MEAS RIGHT PROX CCA PSV: 103.7 CM/SEC
BH CV XLRA MEAS RIGHT PROX ECA EDV: 11.6 CM/SEC
BH CV XLRA MEAS RIGHT PROX ECA PSV: 71.1 CM/SEC
BH CV XLRA MEAS RIGHT PROX ICA EDV: 27 CM/SEC
BH CV XLRA MEAS RIGHT PROX ICA PSV: 74.4 CM/SEC
BH CV XLRA MEAS RIGHT PROX SCLA EDV: 0 CM/SEC
BH CV XLRA MEAS RIGHT PROX SCLA PSV: 50.7 CM/SEC
BH CV XLRA MEAS RIGHT VERTEBRAL A EDV: 25.9 CM/SEC
BH CV XLRA MEAS RIGHT VERTEBRAL A PSV: 72.8 CM/SEC
BUN BLD-MCNC: 14 MG/DL (ref 9–23)
BUN/CREAT SERPL: 16.5 (ref 7–25)
CALCIUM SPEC-SCNC: 8.7 MG/DL (ref 8.7–10.4)
CHLORIDE SERPL-SCNC: 105 MMOL/L (ref 99–109)
CHOLEST SERPL-MCNC: 91 MG/DL (ref 0–200)
CO2 SERPL-SCNC: 28 MMOL/L (ref 20–31)
CREAT BLD-MCNC: 0.85 MG/DL (ref 0.6–1.3)
DEPRECATED RDW RBC AUTO: 63.2 FL (ref 37–54)
ERYTHROCYTE [DISTWIDTH] IN BLOOD BY AUTOMATED COUNT: 16.8 % (ref 11.3–14.5)
GFR SERPL CREATININE-BSD FRML MDRD: 111 ML/MIN/1.73
GLUCOSE BLD-MCNC: 69 MG/DL (ref 70–100)
GLUCOSE BLDC GLUCOMTR-MCNC: 116 MG/DL (ref 70–130)
GLUCOSE BLDC GLUCOMTR-MCNC: 205 MG/DL (ref 70–130)
GLUCOSE BLDC GLUCOMTR-MCNC: 82 MG/DL (ref 70–130)
GLUCOSE BLDC GLUCOMTR-MCNC: 86 MG/DL (ref 70–130)
HBA1C MFR BLD: 6.3 % (ref 4.8–5.6)
HCT VFR BLD AUTO: 30.1 % (ref 38.9–50.9)
HDLC SERPL-MCNC: 26 MG/DL (ref 40–60)
HGB BLD-MCNC: 10.3 G/DL (ref 13.1–17.5)
MCH RBC QN AUTO: 36 PG (ref 27–31)
MCHC RBC AUTO-ENTMCNC: 34.2 G/DL (ref 32–36)
MCV RBC AUTO: 105.2 FL (ref 80–99)
PA ADP PRP-ACNC: 135 PRU
PLATELET # BLD AUTO: 273 10*3/MM3 (ref 150–450)
PMV BLD AUTO: 10.4 FL (ref 6–12)
POTASSIUM BLD-SCNC: 4.1 MMOL/L (ref 3.5–5.5)
RBC # BLD AUTO: 2.86 10*6/MM3 (ref 4.2–5.76)
SODIUM BLD-SCNC: 141 MMOL/L (ref 132–146)
TRIGL SERPL-MCNC: 98 MG/DL (ref 0–150)
TROPONIN I SERPL-MCNC: <0.006 NG/ML
TSH SERPL DL<=0.05 MIU/L-ACNC: 1.81 MIU/ML (ref 0.35–5.35)
WBC NRBC COR # BLD: 5.5 10*3/MM3 (ref 3.5–10.8)

## 2018-09-29 PROCEDURE — C1894 INTRO/SHEATH, NON-LASER: HCPCS | Performed by: INTERNAL MEDICINE

## 2018-09-29 PROCEDURE — 80061 LIPID PANEL: CPT | Performed by: PHYSICIAN ASSISTANT

## 2018-09-29 PROCEDURE — 93880 EXTRACRANIAL BILAT STUDY: CPT

## 2018-09-29 PROCEDURE — 25010000002 MIDAZOLAM PER 1 MG: Performed by: INTERNAL MEDICINE

## 2018-09-29 PROCEDURE — 80048 BASIC METABOLIC PNL TOTAL CA: CPT | Performed by: PHYSICIAN ASSISTANT

## 2018-09-29 PROCEDURE — 0 IOPAMIDOL PER 1 ML: Performed by: INTERNAL MEDICINE

## 2018-09-29 PROCEDURE — C1769 GUIDE WIRE: HCPCS | Performed by: INTERNAL MEDICINE

## 2018-09-29 PROCEDURE — 82962 GLUCOSE BLOOD TEST: CPT

## 2018-09-29 PROCEDURE — 25010000002 FENTANYL CITRATE (PF) 100 MCG/2ML SOLUTION: Performed by: INTERNAL MEDICINE

## 2018-09-29 PROCEDURE — 84484 ASSAY OF TROPONIN QUANT: CPT | Performed by: PHYSICIAN ASSISTANT

## 2018-09-29 PROCEDURE — 93880 EXTRACRANIAL BILAT STUDY: CPT | Performed by: INTERNAL MEDICINE

## 2018-09-29 PROCEDURE — 85576 BLOOD PLATELET AGGREGATION: CPT | Performed by: INTERNAL MEDICINE

## 2018-09-29 PROCEDURE — B2111ZZ FLUOROSCOPY OF MULTIPLE CORONARY ARTERIES USING LOW OSMOLAR CONTRAST: ICD-10-PCS | Performed by: INTERNAL MEDICINE

## 2018-09-29 PROCEDURE — 93458 L HRT ARTERY/VENTRICLE ANGIO: CPT | Performed by: INTERNAL MEDICINE

## 2018-09-29 PROCEDURE — G0378 HOSPITAL OBSERVATION PER HR: HCPCS

## 2018-09-29 PROCEDURE — 99253 IP/OBS CNSLTJ NEW/EST LOW 45: CPT | Performed by: THORACIC SURGERY (CARDIOTHORACIC VASCULAR SURGERY)

## 2018-09-29 PROCEDURE — 25010000002 HEPARIN (PORCINE) PER 1000 UNITS: Performed by: PHYSICIAN ASSISTANT

## 2018-09-29 PROCEDURE — 85027 COMPLETE CBC AUTOMATED: CPT | Performed by: PHYSICIAN ASSISTANT

## 2018-09-29 PROCEDURE — 4A023N7 MEASUREMENT OF CARDIAC SAMPLING AND PRESSURE, LEFT HEART, PERCUTANEOUS APPROACH: ICD-10-PCS | Performed by: INTERNAL MEDICINE

## 2018-09-29 PROCEDURE — B2151ZZ FLUOROSCOPY OF LEFT HEART USING LOW OSMOLAR CONTRAST: ICD-10-PCS | Performed by: INTERNAL MEDICINE

## 2018-09-29 PROCEDURE — 83036 HEMOGLOBIN GLYCOSYLATED A1C: CPT | Performed by: PHYSICIAN ASSISTANT

## 2018-09-29 PROCEDURE — 99024 POSTOP FOLLOW-UP VISIT: CPT | Performed by: INTERNAL MEDICINE

## 2018-09-29 PROCEDURE — 84443 ASSAY THYROID STIM HORMONE: CPT | Performed by: PHYSICIAN ASSISTANT

## 2018-09-29 PROCEDURE — C1760 CLOSURE DEV, VASC: HCPCS | Performed by: INTERNAL MEDICINE

## 2018-09-29 RX ORDER — LIDOCAINE HYDROCHLORIDE 10 MG/ML
INJECTION, SOLUTION INFILTRATION; PERINEURAL AS NEEDED
Status: DISCONTINUED | OUTPATIENT
Start: 2018-09-29 | End: 2018-09-29 | Stop reason: HOSPADM

## 2018-09-29 RX ORDER — ASPIRIN 81 MG/1
81 TABLET ORAL DAILY
Status: DISCONTINUED | OUTPATIENT
Start: 2018-09-29 | End: 2018-09-29 | Stop reason: SDUPTHER

## 2018-09-29 RX ORDER — MIDAZOLAM HYDROCHLORIDE 1 MG/ML
INJECTION INTRAMUSCULAR; INTRAVENOUS AS NEEDED
Status: DISCONTINUED | OUTPATIENT
Start: 2018-09-29 | End: 2018-09-29 | Stop reason: HOSPADM

## 2018-09-29 RX ORDER — HYDROCODONE BITARTRATE AND ACETAMINOPHEN 5; 325 MG/1; MG/1
1 TABLET ORAL 2 TIMES DAILY PRN
Status: DISCONTINUED | OUTPATIENT
Start: 2018-09-29 | End: 2018-10-03

## 2018-09-29 RX ORDER — SODIUM CHLORIDE 9 MG/ML
3 INJECTION, SOLUTION INTRAVENOUS ONCE
Status: COMPLETED | OUTPATIENT
Start: 2018-09-29 | End: 2018-09-29

## 2018-09-29 RX ORDER — ALPRAZOLAM 0.5 MG/1
0.5 TABLET ORAL 2 TIMES DAILY PRN
Status: DISCONTINUED | OUTPATIENT
Start: 2018-09-29 | End: 2018-10-09 | Stop reason: HOSPADM

## 2018-09-29 RX ORDER — GLIPIZIDE 5 MG/1
2.5 TABLET ORAL
Status: DISCONTINUED | OUTPATIENT
Start: 2018-09-30 | End: 2018-10-02

## 2018-09-29 RX ORDER — ATORVASTATIN CALCIUM 40 MG/1
40 TABLET, FILM COATED ORAL NIGHTLY
Status: DISCONTINUED | OUTPATIENT
Start: 2018-09-29 | End: 2018-10-02

## 2018-09-29 RX ORDER — ACETAMINOPHEN 325 MG/1
650 TABLET ORAL EVERY 4 HOURS PRN
Status: DISCONTINUED | OUTPATIENT
Start: 2018-09-29 | End: 2018-10-02

## 2018-09-29 RX ORDER — FENTANYL CITRATE 50 UG/ML
INJECTION, SOLUTION INTRAMUSCULAR; INTRAVENOUS AS NEEDED
Status: DISCONTINUED | OUTPATIENT
Start: 2018-09-29 | End: 2018-09-29 | Stop reason: HOSPADM

## 2018-09-29 RX ORDER — NEBIVOLOL 5 MG/1
5 TABLET ORAL DAILY
Status: DISCONTINUED | OUTPATIENT
Start: 2018-09-29 | End: 2018-09-29 | Stop reason: SDUPTHER

## 2018-09-29 RX ORDER — ATORVASTATIN CALCIUM 40 MG/1
40 TABLET, FILM COATED ORAL DAILY
Status: DISCONTINUED | OUTPATIENT
Start: 2018-09-29 | End: 2018-09-29 | Stop reason: SDUPTHER

## 2018-09-29 RX ORDER — LISINOPRIL 10 MG/1
10 TABLET ORAL DAILY
Status: DISCONTINUED | OUTPATIENT
Start: 2018-09-29 | End: 2018-09-29 | Stop reason: SDUPTHER

## 2018-09-29 RX ADMIN — SODIUM CHLORIDE 3 ML/KG/HR: 9 INJECTION, SOLUTION INTRAVENOUS at 13:12

## 2018-09-29 RX ADMIN — INSULIN LISPRO 3 UNITS: 100 INJECTION, SOLUTION INTRAVENOUS; SUBCUTANEOUS at 20:58

## 2018-09-29 RX ADMIN — CLOPIDOGREL BISULFATE 75 MG: 75 TABLET ORAL at 09:09

## 2018-09-29 RX ADMIN — HEPARIN SODIUM 5000 UNITS: 5000 INJECTION, SOLUTION INTRAVENOUS; SUBCUTANEOUS at 20:58

## 2018-09-29 RX ADMIN — HEPARIN SODIUM 5000 UNITS: 5000 INJECTION, SOLUTION INTRAVENOUS; SUBCUTANEOUS at 18:06

## 2018-09-29 RX ADMIN — ATORVASTATIN CALCIUM 40 MG: 40 TABLET, FILM COATED ORAL at 20:58

## 2018-09-29 RX ADMIN — NEBIVOLOL HYDROCHLORIDE 5 MG: 5 TABLET ORAL at 09:09

## 2018-09-29 RX ADMIN — HYDROCODONE BITARTRATE AND ACETAMINOPHEN 1 TABLET: 5; 325 TABLET ORAL at 18:06

## 2018-09-29 RX ADMIN — ASPIRIN 81 MG: 81 TABLET, COATED ORAL at 09:09

## 2018-09-29 RX ADMIN — LISINOPRIL 10 MG: 10 TABLET ORAL at 09:09

## 2018-09-30 LAB
GLUCOSE BLDC GLUCOMTR-MCNC: 113 MG/DL (ref 70–130)
GLUCOSE BLDC GLUCOMTR-MCNC: 137 MG/DL (ref 70–130)
GLUCOSE BLDC GLUCOMTR-MCNC: 193 MG/DL (ref 70–130)
GLUCOSE BLDC GLUCOMTR-MCNC: 77 MG/DL (ref 70–130)
PA ADP PRP-ACNC: 189 PRU

## 2018-09-30 PROCEDURE — 99232 SBSQ HOSP IP/OBS MODERATE 35: CPT | Performed by: INTERNAL MEDICINE

## 2018-09-30 PROCEDURE — 82962 GLUCOSE BLOOD TEST: CPT

## 2018-09-30 PROCEDURE — G0378 HOSPITAL OBSERVATION PER HR: HCPCS

## 2018-09-30 PROCEDURE — 25010000002 HEPARIN (PORCINE) PER 1000 UNITS: Performed by: PHYSICIAN ASSISTANT

## 2018-09-30 PROCEDURE — 85576 BLOOD PLATELET AGGREGATION: CPT | Performed by: THORACIC SURGERY (CARDIOTHORACIC VASCULAR SURGERY)

## 2018-09-30 RX ADMIN — HEPARIN SODIUM 5000 UNITS: 5000 INJECTION, SOLUTION INTRAVENOUS; SUBCUTANEOUS at 06:45

## 2018-09-30 RX ADMIN — ATORVASTATIN CALCIUM 40 MG: 40 TABLET, FILM COATED ORAL at 20:45

## 2018-09-30 RX ADMIN — ASPIRIN 81 MG: 81 TABLET, COATED ORAL at 09:29

## 2018-09-30 RX ADMIN — INSULIN LISPRO 2 UNITS: 100 INJECTION, SOLUTION INTRAVENOUS; SUBCUTANEOUS at 11:32

## 2018-09-30 RX ADMIN — NEBIVOLOL HYDROCHLORIDE 5 MG: 5 TABLET ORAL at 09:29

## 2018-09-30 RX ADMIN — GLIPIZIDE 2.5 MG: 5 TABLET ORAL at 09:27

## 2018-09-30 RX ADMIN — HEPARIN SODIUM 5000 UNITS: 5000 INJECTION, SOLUTION INTRAVENOUS; SUBCUTANEOUS at 14:18

## 2018-09-30 RX ADMIN — LISINOPRIL 10 MG: 10 TABLET ORAL at 09:30

## 2018-09-30 RX ADMIN — HEPARIN SODIUM 5000 UNITS: 5000 INJECTION, SOLUTION INTRAVENOUS; SUBCUTANEOUS at 22:09

## 2018-10-01 ENCOUNTER — ANESTHESIA EVENT (OUTPATIENT)
Dept: PERIOP | Facility: HOSPITAL | Age: 63
End: 2018-10-01

## 2018-10-01 ENCOUNTER — APPOINTMENT (OUTPATIENT)
Dept: PULMONOLOGY | Facility: HOSPITAL | Age: 63
End: 2018-10-01

## 2018-10-01 PROBLEM — I25.10 CORONARY ARTERY DISEASE: Status: ACTIVE | Noted: 2018-10-01

## 2018-10-01 LAB
ABO GROUP BLD: NORMAL
ABO GROUP BLD: NORMAL
ALBUMIN SERPL-MCNC: 4.59 G/DL (ref 3.2–4.8)
ALBUMIN/GLOB SERPL: 1.8 G/DL (ref 1.5–2.5)
ALP SERPL-CCNC: 72 U/L (ref 25–100)
ALT SERPL W P-5'-P-CCNC: 30 U/L (ref 7–40)
ANION GAP SERPL CALCULATED.3IONS-SCNC: 9 MMOL/L (ref 3–11)
APTT PPP: 26.1 SECONDS (ref 24–31)
AST SERPL-CCNC: 33 U/L (ref 0–33)
BILIRUB SERPL-MCNC: 1.8 MG/DL (ref 0.3–1.2)
BILIRUB UR QL STRIP: NEGATIVE
BLD GP AB SCN SERPL QL: NEGATIVE
BUN BLD-MCNC: 13 MG/DL (ref 9–23)
BUN/CREAT SERPL: 13.4 (ref 7–25)
CALCIUM SPEC-SCNC: 9.4 MG/DL (ref 8.7–10.4)
CHLORIDE SERPL-SCNC: 100 MMOL/L (ref 99–109)
CLARITY UR: CLEAR
CO2 SERPL-SCNC: 28 MMOL/L (ref 20–31)
COLOR UR: YELLOW
CREAT BLD-MCNC: 0.97 MG/DL (ref 0.6–1.3)
DEPRECATED RDW RBC AUTO: 63.7 FL (ref 37–54)
ERYTHROCYTE [DISTWIDTH] IN BLOOD BY AUTOMATED COUNT: 16.9 % (ref 11.3–14.5)
GFR SERPL CREATININE-BSD FRML MDRD: 95 ML/MIN/1.73
GLOBULIN UR ELPH-MCNC: 2.5 GM/DL
GLUCOSE BLD-MCNC: 152 MG/DL (ref 70–100)
GLUCOSE BLDC GLUCOMTR-MCNC: 100 MG/DL (ref 70–130)
GLUCOSE BLDC GLUCOMTR-MCNC: 120 MG/DL (ref 70–130)
GLUCOSE BLDC GLUCOMTR-MCNC: 140 MG/DL (ref 70–130)
GLUCOSE BLDC GLUCOMTR-MCNC: 165 MG/DL (ref 70–130)
GLUCOSE UR STRIP-MCNC: NEGATIVE MG/DL
HCT VFR BLD AUTO: 36.7 % (ref 38.9–50.9)
HGB BLD-MCNC: 12.6 G/DL (ref 13.1–17.5)
HGB UR QL STRIP.AUTO: NEGATIVE
INR PPP: 0.98 (ref 0.91–1.09)
KETONES UR QL STRIP: ABNORMAL
LEUKOCYTE ESTERASE UR QL STRIP.AUTO: NEGATIVE
MCH RBC QN AUTO: 36.2 PG (ref 27–31)
MCHC RBC AUTO-ENTMCNC: 34.3 G/DL (ref 32–36)
MCV RBC AUTO: 105.5 FL (ref 80–99)
NITRITE UR QL STRIP: NEGATIVE
PA ADP PRP-ACNC: 170 PRU
PH UR STRIP.AUTO: 5.5 [PH] (ref 5–8)
PLATELET # BLD AUTO: 293 10*3/MM3 (ref 150–450)
PMV BLD AUTO: 10.5 FL (ref 6–12)
POTASSIUM BLD-SCNC: 4.1 MMOL/L (ref 3.5–5.5)
PROT SERPL-MCNC: 7.1 G/DL (ref 5.7–8.2)
PROT UR QL STRIP: NEGATIVE
PROTHROMBIN TIME: 10.3 SECONDS (ref 9.6–11.5)
RBC # BLD AUTO: 3.48 10*6/MM3 (ref 4.2–5.76)
RH BLD: POSITIVE
RH BLD: POSITIVE
SODIUM BLD-SCNC: 137 MMOL/L (ref 132–146)
SP GR UR STRIP: 1.02 (ref 1–1.03)
T&S EXPIRATION DATE: NORMAL
UROBILINOGEN UR QL STRIP: ABNORMAL
WBC NRBC COR # BLD: 7.04 10*3/MM3 (ref 3.5–10.8)

## 2018-10-01 PROCEDURE — 86900 BLOOD TYPING SEROLOGIC ABO: CPT

## 2018-10-01 PROCEDURE — 85576 BLOOD PLATELET AGGREGATION: CPT | Performed by: PHYSICIAN ASSISTANT

## 2018-10-01 PROCEDURE — 86850 RBC ANTIBODY SCREEN: CPT | Performed by: PHYSICIAN ASSISTANT

## 2018-10-01 PROCEDURE — 85610 PROTHROMBIN TIME: CPT | Performed by: PHYSICIAN ASSISTANT

## 2018-10-01 PROCEDURE — 86900 BLOOD TYPING SEROLOGIC ABO: CPT | Performed by: PHYSICIAN ASSISTANT

## 2018-10-01 PROCEDURE — 99024 POSTOP FOLLOW-UP VISIT: CPT | Performed by: PHYSICIAN ASSISTANT

## 2018-10-01 PROCEDURE — 94760 N-INVAS EAR/PLS OXIMETRY 1: CPT

## 2018-10-01 PROCEDURE — 25010000002 HEPARIN (PORCINE) PER 1000 UNITS: Performed by: PHYSICIAN ASSISTANT

## 2018-10-01 PROCEDURE — 94799 UNLISTED PULMONARY SVC/PX: CPT

## 2018-10-01 PROCEDURE — 94010 BREATHING CAPACITY TEST: CPT | Performed by: INTERNAL MEDICINE

## 2018-10-01 PROCEDURE — 82962 GLUCOSE BLOOD TEST: CPT

## 2018-10-01 PROCEDURE — 85027 COMPLETE CBC AUTOMATED: CPT | Performed by: PHYSICIAN ASSISTANT

## 2018-10-01 PROCEDURE — 85730 THROMBOPLASTIN TIME PARTIAL: CPT | Performed by: PHYSICIAN ASSISTANT

## 2018-10-01 PROCEDURE — 80306 DRUG TEST PRSMV INSTRMNT: CPT | Performed by: PHYSICIAN ASSISTANT

## 2018-10-01 PROCEDURE — 99232 SBSQ HOSP IP/OBS MODERATE 35: CPT | Performed by: INTERNAL MEDICINE

## 2018-10-01 PROCEDURE — 94010 BREATHING CAPACITY TEST: CPT

## 2018-10-01 PROCEDURE — 86901 BLOOD TYPING SEROLOGIC RH(D): CPT

## 2018-10-01 PROCEDURE — 86923 COMPATIBILITY TEST ELECTRIC: CPT

## 2018-10-01 PROCEDURE — 86901 BLOOD TYPING SEROLOGIC RH(D): CPT | Performed by: PHYSICIAN ASSISTANT

## 2018-10-01 PROCEDURE — 86920 COMPATIBILITY TEST SPIN: CPT

## 2018-10-01 PROCEDURE — 81003 URINALYSIS AUTO W/O SCOPE: CPT | Performed by: PHYSICIAN ASSISTANT

## 2018-10-01 PROCEDURE — 80053 COMPREHEN METABOLIC PANEL: CPT | Performed by: PHYSICIAN ASSISTANT

## 2018-10-01 RX ORDER — ASPIRIN 81 MG/1
81 TABLET ORAL ONCE
Status: COMPLETED | OUTPATIENT
Start: 2018-10-01 | End: 2018-10-01

## 2018-10-01 RX ORDER — ASPIRIN 81 MG/1
81 TABLET ORAL DAILY
Status: DISCONTINUED | OUTPATIENT
Start: 2018-10-02 | End: 2018-10-02

## 2018-10-01 RX ORDER — CHLORHEXIDINE GLUCONATE 0.12 MG/ML
15 RINSE ORAL EVERY 12 HOURS
Status: COMPLETED | OUTPATIENT
Start: 2018-10-01 | End: 2018-10-02

## 2018-10-01 RX ORDER — SODIUM CHLORIDE 0.9 % (FLUSH) 0.9 %
1-10 SYRINGE (ML) INJECTION AS NEEDED
Status: DISCONTINUED | OUTPATIENT
Start: 2018-10-01 | End: 2018-10-02

## 2018-10-01 RX ORDER — CHLORHEXIDINE GLUCONATE 500 MG/1
1 CLOTH TOPICAL EVERY 12 HOURS PRN
Status: DISCONTINUED | OUTPATIENT
Start: 2018-10-01 | End: 2018-10-02

## 2018-10-01 RX ORDER — IPRATROPIUM BROMIDE AND ALBUTEROL SULFATE 2.5; .5 MG/3ML; MG/3ML
3 SOLUTION RESPIRATORY (INHALATION) EVERY 4 HOURS PRN
Status: DISCONTINUED | OUTPATIENT
Start: 2018-10-01 | End: 2018-10-09 | Stop reason: HOSPADM

## 2018-10-01 RX ADMIN — LISINOPRIL 10 MG: 10 TABLET ORAL at 08:47

## 2018-10-01 RX ADMIN — INSULIN LISPRO 2 UNITS: 100 INJECTION, SOLUTION INTRAVENOUS; SUBCUTANEOUS at 10:08

## 2018-10-01 RX ADMIN — HEPARIN SODIUM 5000 UNITS: 5000 INJECTION, SOLUTION INTRAVENOUS; SUBCUTANEOUS at 06:44

## 2018-10-01 RX ADMIN — GLIPIZIDE 2.5 MG: 5 TABLET ORAL at 08:47

## 2018-10-01 RX ADMIN — ASPIRIN 81 MG: 81 TABLET, COATED ORAL at 08:47

## 2018-10-01 RX ADMIN — MUPIROCIN 1 APPLICATION: 20 OINTMENT TOPICAL at 22:12

## 2018-10-01 RX ADMIN — CHLORHEXIDINE GLUCONATE 15 ML: 1.2 RINSE ORAL at 22:12

## 2018-10-01 RX ADMIN — ATORVASTATIN CALCIUM 40 MG: 40 TABLET, FILM COATED ORAL at 22:12

## 2018-10-01 RX ADMIN — NEBIVOLOL HYDROCHLORIDE 5 MG: 5 TABLET ORAL at 08:47

## 2018-10-01 NOTE — PROGRESS NOTES
CTS Progress Note    Preop      Chief Complaint: Coronary artery disease    Subjective  Patient states he feels minimal discomfort in the right side of his neck which began yesterday otherwise is doing well without points.  Denies chest pain or dyspnea.      Objective    Physical Exam:   Vital Signs   Temp:  [97.7 °F (36.5 °C)-98 °F (36.7 °C)] 98 °F (36.7 °C)  Heart Rate:  [61-86] 61  Resp:  [12-16] 16  BP: ()/(57-73) 93/57   GEN: NAD   RESP: Clear to auscultation bilaterally no wheezes, rales or rhonchi   Neck: Supple without bruit, swelling, lymphadenopathy or thyromegaly    CV: Regular rate and rhythm no murmurs, rubs or gallops   ABD: Soft, nontender/nondistended with normoactive bowel sounds    EXT: Warm with good color well-perfused no bilateral lower extremity edema   INT: No clubbing or cyanosis with no lesions or rashes appreciated    Intake/Output Summary (Last 24 hours) at 10/01/18 0823  Last data filed at 09/30/18 2330   Gross per 24 hour   Intake                0 ml   Output              725 ml   Net             -725 ml     Results       Results from last 7 days  Lab Units 09/29/18  0600   WBC 10*3/mm3 5.50   HEMOGLOBIN g/dL 10.3*   HEMATOCRIT % 30.1*   PLATELETS 10*3/mm3 273       Results from last 7 days  Lab Units 09/29/18  0600   SODIUM mmol/L 141   POTASSIUM mmol/L 4.1   CHLORIDE mmol/L 105   CO2 mmol/L 28.0   BUN mg/dL 14   CREATININE mg/dL 0.85   GLUCOSE mg/dL 69*   CALCIUM mg/dL 8.7       Results from last 7 days  Lab Units 09/28/18  1421   INR  0.96       P2Y12: Pending      Assessment/Plan     Active Problems:    Unstable angina (CMS/HCC)    Coronary artery disease involving native coronary artery of native heart with unstable angina pectoris (CMS/HCC)        Plan   P2Y12 pending, awaiting normalization.  NPO after midnight  Plan on proceeding with coronary artery bypass grafting tomorrow, 10/2/2018.    GREGG López  10/01/18  8:23 AM    As above.  Pre op AM.  Note <Hct.  (etiology?). I have reviewed, verified, and confirmed the above history and current status.  I have examined the patient and confirmed the above physical findings.Above plan and treatment regimen discussed in detail with patient.  Options of treatment, attendant risks vs benefits, and my recommendations were discussed and all questions answered.    Shane Bosch MD  [facility]  10/01/18   1:57 PM

## 2018-10-01 NOTE — PLAN OF CARE
Problem: Patient Care Overview  Goal: Plan of Care Review  Outcome: Ongoing (interventions implemented as appropriate)   10/01/18 0519   Coping/Psychosocial   Plan of Care Reviewed With patient   Plan of Care Review   Progress no change   OTHER   Outcome Summary no chest pain tonight c/o some 'soreness and numbness R neck last night sl swelling noted PA notified an no further complaints no chest papin sinus rhythm with sinus tach when up in bathroom awaiting CABG tomorrow will continue to monitor

## 2018-10-01 NOTE — PAYOR COMM NOTE
"Judah Cerna (62 y.o. Male)  Ref #  [id number]  Brittni Velez RN, BSN  Phone # [phone]  Fax # [phone]    Date of Birth Social Security Number Address Home Phone MRN    1955  [address] [address] [phone] [id number]    Protestant Marital Status          Unknown        Admission Date Admission Type Admitting Provider Attending Provider Department, Room/Bed    9/28/18 Emergency Nadir Tanner MD Jones, Michael R, MD [facility] 6A, N621/1    Discharge Date Discharge Disposition Discharge Destination                       Attending Provider:  Nadir Tanner MD    Allergies:  No Known Allergies    Isolation:  None   Infection:  None   Code Status:  CPR    Ht:  165.1 cm (65\")   Wt:  71.8 kg (158 lb 3.2 oz)    Admission Cmt:  None   Principal Problem:  None                Active Insurance as of 9/28/2018     Primary Coverage     Payor Plan Insurance Group Employer/Plan Group    [place] [id number]     Payor Plan Address Payor Plan Phone Number Effective From Effective To    [address]  1/13/2002     [facility] [id number]       Subscriber Name Subscriber Birth Date Member ID       JUDAH CERNA 1955 [id number]                 Emergency Contacts      (Rel.) Home Phone Work Phone Mobile Phone    Fe Cerna (Spouse) [phone] -- --            History & Physical     No notes of this type exist for this encounter.           Emergency Department Notes      Jose Elias Gu PA at 9/28/2018  2:00 PM     Attestation signed by Jose Carlos Burdick MD at 9/29/2018  6:44 PM          For this patient encounter, I reviewed the NP or PA documentation, treatment plan, and medical decision making. Jose Carlos Burdick MD 9/29/2018 6:44 PM                  Subjective   Judah Cerna is a 62 y.o.male who presents to the ED with complaints of substernal chest pain. The patient reports his discomfort has been worsening " for the past three days. He rates his pain as an 8/10 and states it worsens with exertion, but is relieved when he is sitting. He states his discomfort radiates into his neck, for he developed numbness in neck about 20 minutes ago. He takes Aspirin daily. He also complains of shortness of breath and a cough, but denies any trouble swallowing or vomiting. He denies any recent travel. He has a history of HTN, HLD, DM, MI and CABG, but denies any history of tobacco abuse, alcohol abuse, DVT, or PE. He also denies any family history of early CAD. There are no other complaints at this time.         History provided by:  Patient  Chest Pain   Pain location:  Substernal area  Pain quality: aching    Pain radiates to:  Neck  Pain severity:  Severe (8/10)  Onset quality:  Sudden  Duration:  3 days  Timing:  Constant  Progression:  Worsening  Chronicity:  New  Relieved by: sitting.  Worsened by:  Exertion  Ineffective treatments:  Aspirin  Associated symptoms: cough, dysphagia, numbness (in his neck) and shortness of breath    Associated symptoms: no vomiting    Risk factors: coronary artery disease, diabetes mellitus, high cholesterol, hypertension and male sex    Risk factors: no prior DVT/PE and no smoking        Review of Systems   HENT: Positive for trouble swallowing.    Respiratory: Positive for cough and shortness of breath.    Cardiovascular: Positive for chest pain.   Gastrointestinal: Negative for vomiting.   Musculoskeletal: Positive for neck pain.   Neurological: Positive for numbness (in his neck).   All other systems reviewed and are negative.      Past Medical History:   Diagnosis Date   • CAD (coronary artery disease)    • Dyslipidemia    • Heart attack    • Hyperlipidemia    • Hypertension    • Type 2 diabetes mellitus (CMS/HCC)        No Known Allergies    Past Surgical History:   Procedure Laterality Date   • APPENDECTOMY      w/ partial bowel resection due to ruptured appendix   • CARDIAC CATHETERIZATION      • CORONARY STENT PLACEMENT      x 2       History reviewed. No pertinent family history.    Social History     Social History   • Marital status:      Social History Main Topics   • Smoking status: Never Smoker   • Smokeless tobacco: Never Used   • Alcohol use No   • Drug use: No   • Sexual activity: Defer     Other Topics Concern   • Not on file         Objective   Physical Exam   Constitutional: He is oriented to person, place, and time. He appears well-developed and well-nourished. No distress.   HENT:   Head: Normocephalic and atraumatic.   Nose: Nose normal.   Small petechiae in posterior pharynx, more on the left compared to the right.    Eyes: Conjunctivae are normal. No scleral icterus.   Neck: Normal range of motion. Neck supple.   Cardiovascular: Normal rate, regular rhythm and normal heart sounds.    No murmur heard.  Pulmonary/Chest: Effort normal and breath sounds normal. No respiratory distress.   Abdominal: Soft. Bowel sounds are normal. There is no tenderness.   Musculoskeletal: Normal range of motion. He exhibits tenderness. He exhibits no edema.   Tenderness over costal margin. No crepitus. No pitting edema.    Neurological: He is alert and oriented to person, place, and time.   Skin: Skin is warm and dry.   Psychiatric: He has a normal mood and affect. His behavior is normal.   Nursing note and vitals reviewed.      Procedures        ED Course     Recent Results (from the past 24 hour(s))   Comprehensive Metabolic Panel    Collection Time: 09/28/18  2:21 PM   Result Value Ref Range    Glucose 97 70 - 100 mg/dL    BUN 12 9 - 23 mg/dL    Creatinine 0.94 0.60 - 1.30 mg/dL    Sodium 139 132 - 146 mmol/L    Potassium 4.1 3.5 - 5.5 mmol/L    Chloride 100 99 - 109 mmol/L    CO2 24.0 20.0 - 31.0 mmol/L    Calcium 9.3 8.7 - 10.4 mg/dL    Total Protein 6.8 5.7 - 8.2 g/dL    Albumin 4.39 3.20 - 4.80 g/dL    ALT (SGPT) 23 7 - 40 U/L    AST (SGOT) 29 0 - 33 U/L    Alkaline Phosphatase 67 25 - 100 U/L     Total Bilirubin 1.4 (H) 0.3 - 1.2 mg/dL    eGFR  African Amer 99 >60 mL/min/1.73    Globulin 2.4 gm/dL    A/G Ratio 1.8 1.5 - 2.5 g/dL    BUN/Creatinine Ratio 12.8 7.0 - 25.0    Anion Gap 15.0 (H) 3.0 - 11.0 mmol/L   Protime-INR    Collection Time: 09/28/18  2:21 PM   Result Value Ref Range    Protime 10.1 9.6 - 11.5 Seconds    INR 0.96 0.91 - 1.09   Light Blue Top    Collection Time: 09/28/18  2:21 PM   Result Value Ref Range    Extra Tube hold for add-on    Green Top (Gel)    Collection Time: 09/28/18  2:21 PM   Result Value Ref Range    Extra Tube Hold for add-ons.    Lavender Top    Collection Time: 09/28/18  2:21 PM   Result Value Ref Range    Extra Tube hold for add-on    Gold Top - SST    Collection Time: 09/28/18  2:21 PM   Result Value Ref Range    Extra Tube Hold for add-ons.    CBC Auto Differential    Collection Time: 09/28/18  2:21 PM   Result Value Ref Range    WBC 8.66 3.50 - 10.80 10*3/mm3    RBC 3.35 (L) 4.20 - 5.76 10*6/mm3    Hemoglobin 12.2 (L) 13.1 - 17.5 g/dL    Hematocrit 35.8 (L) 38.9 - 50.9 %    .9 (H) 80.0 - 99.0 fL    MCH 36.4 (H) 27.0 - 31.0 pg    MCHC 34.1 32.0 - 36.0 g/dL    RDW 16.8 (H) 11.3 - 14.5 %    RDW-SD 64.6 (H) 37.0 - 54.0 fl    MPV 10.8 6.0 - 12.0 fL    Platelets 360 150 - 450 10*3/mm3    Neutrophil % 60.2 41.0 - 71.0 %    Lymphocyte % 29.6 24.0 - 44.0 %    Monocyte % 6.2 0.0 - 12.0 %    Eosinophil % 3.9 (H) 0.0 - 3.0 %    Basophil % 0.1 0.0 - 1.0 %    Immature Grans % 0.2 0.0 - 0.6 %    Neutrophils, Absolute 5.21 1.50 - 8.30 10*3/mm3    Lymphocytes, Absolute 2.56 0.60 - 4.80 10*3/mm3    Monocytes, Absolute 0.54 0.00 - 1.00 10*3/mm3    Eosinophils, Absolute 0.34 (H) 0.00 - 0.30 10*3/mm3    Basophils, Absolute 0.01 0.00 - 0.20 10*3/mm3    Immature Grans, Absolute 0.02 0.00 - 0.03 10*3/mm3   D-dimer, Quantitative    Collection Time: 09/28/18  2:21 PM   Result Value Ref Range    D-Dimer, Quantitative 0.33 0.00 - 0.50 mg/L (FEU)   Troponin    Collection Time: 09/28/18   "2:21 PM   Result Value Ref Range    Troponin I <0.006 <=0.039 ng/mL   POC Troponin, Rapid    Collection Time: 09/28/18  3:13 PM   Result Value Ref Range    Troponin I 0.01 0.00 - 0.07 ng/mL   Troponin    Collection Time: 09/28/18  5:30 PM   Result Value Ref Range    Troponin I <0.006 <=0.039 ng/mL   POC Glucose Once    Collection Time: 09/28/18  9:07 PM   Result Value Ref Range    Glucose 90 70 - 130 mg/dL     Note: In addition to lab results from this visit, the labs listed above may include labs taken at another facility or during a different encounter within the last 24 hours. Please correlate lab times with ED admission and discharge times for further clarification of the services performed during this visit.    XR Chest 2 View   Preliminary Result   Perihilar prominence of lung markings consistent with   peribronchial inflammatory process such as reactive airways disease   and/or bronchitis, however, no focal consolidation or overt edema.       D:  09/28/2018   E:  09/28/2018                    Vitals:    09/28/18 1939 09/28/18 1944 09/28/18 1951 09/28/18 2048   BP: 124/70 119/57 104/66    BP Location:  Right arm Left arm    Patient Position:  Lying Lying    Pulse: 68  61    Resp:   12    Temp:   97.3 °F (36.3 °C)    TempSrc:   Oral    SpO2:   90%    Weight:    71.8 kg (158 lb 3.2 oz)   Height:    165.1 cm (65\")     Medications   sodium chloride 0.9 % flush 10 mL (not administered)   sodium chloride 0.9 % flush 1-10 mL (not administered)   heparin (porcine) 5000 UNIT/ML injection 5,000 Units (5,000 Units Subcutaneous Given 9/28/18 2132)   atorvastatin (LIPITOR) tablet 80 mg (80 mg Oral Given 9/28/18 2109)   nebivolol (BYSTOLIC) tablet 5 mg (not administered)   lisinopril (PRINIVIL,ZESTRIL) tablet 10 mg (not administered)   aspirin EC tablet 81 mg (not administered)   clopidogrel (PLAVIX) tablet 75 mg (not administered)   dextrose (GLUTOSE) oral gel 15 g (not administered)   dextrose (D50W) 25 g/ 50mL " Intravenous Solution 25 g (not administered)   glucagon (human recombinant) (GLUCAGEN DIAGNOSTIC) injection 1 mg (not administered)   insulin lispro (humaLOG) injection 0-7 Units (not administered)   aspirin chewable tablet 324 mg (324 mg Oral Given 9/28/18 1430)   nitroglycerin (NITROSTAT) ointment 1 inch (1 inch Topical Given 9/28/18 1430)   clopidogrel (PLAVIX) tablet 600 mg (600 mg Oral Given 9/28/18 2132)     ECG/EMG Results (last 24 hours)     Procedure Component Value Units Date/Time    ECG 12 Lead [[id number]] Collected:  09/28/18 1344     Updated:  09/28/18 1346                        MDM  Number of Diagnoses or Management Options  Unstable angina (CMS/HCC): new and requires workup     Amount and/or Complexity of Data Reviewed  Clinical lab tests: reviewed and ordered  Tests in the radiology section of CPT®:  reviewed and ordered  Tests in the medicine section of CPT®:  reviewed and ordered  Discuss the patient with other providers: yes    Patient Progress  Patient progress: stable      Final diagnoses:   Unstable angina (CMS/HCC)       Documentation assistance provided by richa Keyes.  Information recorded by the richa was done at my direction and has been verified and validated by me.     Dante Keyes  09/28/18 1446       Dante Keyes  09/28/18 1753       Jose Elias Gu PA  09/28/18 2142      Electronically signed by Jose Carlos Burdick MD at 9/29/2018  6:44 PM            Physician Progress Notes (last 72 hours) (Notes from 9/28/2018 12:43 PM through 10/1/2018 12:43 PM)      Kay Stevens MD at 10/1/2018  9:10 AM          [facility]  IP Progress Note   LOS: 0 days   Patient Care Team:  Justin Casas APRN as PCP - General (Family Medicine)    Chief Complaint: Follow up for Coronary Artery Disease    Subjective    Sitting up in chair, has been ambulating without difficulty.  Denies chest pain or shortness of breath.      Problem  "  Unstable Angina ([facility])   Cad (Coronary Artery Disease)    a. -History of \"small heart attack,\" in 1999. cardiac catheterization study followed by 2 coronary stents; incomplete database.  b. Cardiolite stress test, 09/23/2014, was normal with excellent/above average exercise capacity and ejection fraction 55%.  c. Echocardiogram, 09/23/2014, showed ejection fraction 65% with mild mitral and mild tricuspid regurgitation.   d. [facility] 9-29-18:  · Mildly abnormal left ventriculogram with an estimated ejection fraction of 60% and mild apical hypokinesia  · Severe 3 vessel disease that is associated with an acute coronary syndrome in a younger diabetic.     Abnormal Ekg   Hypertension   Dyslipidemia   Type 2 Diabetes Mellitus ([facility])       Tele: Sinus Rythym    Vitals:  Blood pressure 113/72, pulse 81, temperature 98 °F (36.7 °C), temperature source Oral, resp. rate 16, height 165.1 cm (65\"), weight 71.8 kg (158 lb 3.2 oz), SpO2 97 %.     Intake/Output Summary (Last 24 hours) at 10/01/18 0911  Last data filed at 09/30/18 2330   Gross per 24 hour   Intake                0 ml   Output              325 ml   Net             -325 ml       Physical Exam:    General: alert, no acute distress, acyanotic, well developed, well nourished   Chest: Clear Auscultation and No Wheezes   CV: Heart sounds are normal.  Regular rate and rhythm without murmur, gallop or rub.   Extremities: negative, groin intact.    Results Review:     I reviewed the patient's new clinical results.      Results from last 7 days  Lab Units 10/01/18  0828   WBC 10*3/mm3 7.04   HEMOGLOBIN g/dL 12.6*   HEMATOCRIT % 36.7*   PLATELETS 10*3/mm3 293       Results from last 7 days  Lab Units 09/29/18  0600 09/28/18  1421   SODIUM mmol/L 141 139   POTASSIUM mmol/L 4.1 4.1   CHLORIDE mmol/L 105 100   CO2 mmol/L 28.0 24.0   BUN mg/dL 14 12   CREATININE mg/dL 0.85 0.94   CALCIUM mg/dL 8.7 9.3   BILIRUBIN mg/dL  --  1.4*   ALK PHOS U/L  --  67   ALT (SGPT) U/L  --  23 "   AST (SGOT) U/L  --  29   GLUCOSE mg/dL 69* 97         Scheduled Meds:  [START ON 10/2/2018] aspirin 81 mg Oral Daily   atorvastatin 40 mg Oral Nightly   chlorhexidine 15 mL Mouth/Throat Q12H   glipiZIDE 2.5 mg Oral QAM AC   insulin lispro 0-7 Units Subcutaneous 4x Daily With Meals & Nightly   lisinopril 10 mg Oral Q24H   [START ON 10/2/2018] metoprolol tartrate 12.5 mg Oral On Call to OR   mupirocin 1 application Each Nare Q12H   nebivolol 5 mg Oral Q24H   pharmacy consult - MTM  Does not apply Daily     Assessment and plan:  1. CAD with unstable angina   - LHC demonstrated severe 3 vessel disease  - plan for CABG per CTS on Tuesday     2. HTN  - well controlled     3. HLD  - LDL 44 on Lipitor 40mg      GREGG Amaya  10/01/18  9:11 AM      I have seen and examined the patient, case was discussed with the physician extender, reviewed the above note, necessary changes were made and I agree with the final note.   Kay Stevens MD, FACC, FSCAI                            Electronically signed by Kay Stevens MD at 10/1/2018 12:22 PM     Bryan Kirk PA at 10/1/2018  8:22 AM          CTS Progress Note    Preop      Chief Complaint: Coronary artery disease    Subjective  Patient states he feels minimal discomfort in the right side of his neck which began yesterday otherwise is doing well without points.  Denies chest pain or dyspnea.      Objective    Physical Exam:   Vital Signs   Temp:  [97.7 °F (36.5 °C)-98 °F (36.7 °C)] 98 °F (36.7 °C)  Heart Rate:  [61-86] 61  Resp:  [12-16] 16  BP: ()/(57-73) 93/57   GEN: NAD   RESP: Clear to auscultation bilaterally no wheezes, rales or rhonchi   Neck: Supple without bruit, swelling, lymphadenopathy or thyromegaly    CV: Regular rate and rhythm no murmurs, rubs or gallops   ABD: Soft, nontender/nondistended with normoactive bowel sounds    EXT: Warm with good color well-perfused no bilateral lower extremity edema   INT: No clubbing or cyanosis with no lesions  or rashes appreciated    Intake/Output Summary (Last 24 hours) at 10/01/18 0823  Last data filed at 09/30/18 2330   Gross per 24 hour   Intake                0 ml   Output              725 ml   Net             -725 ml     Results       Results from last 7 days  Lab Units 09/29/18  0600   WBC 10*3/mm3 5.50   HEMOGLOBIN g/dL 10.3*   HEMATOCRIT % 30.1*   PLATELETS 10*3/mm3 273       Results from last 7 days  Lab Units 09/29/18  0600   SODIUM mmol/L 141   POTASSIUM mmol/L 4.1   CHLORIDE mmol/L 105   CO2 mmol/L 28.0   BUN mg/dL 14   CREATININE mg/dL 0.85   GLUCOSE mg/dL 69*   CALCIUM mg/dL 8.7       Results from last 7 days  Lab Units 09/28/18  1421   INR  0.96       P2Y12: Pending      Assessment/Plan     Active Problems:    Unstable angina (CMS/HCC)    Coronary artery disease involving native coronary artery of native heart with unstable angina pectoris (CMS/HCC)        Plan   P2Y12 pending, awaiting normalization.  NPO after midnight  Plan on proceeding with coronary artery bypass grafting tomorrow, 10/2/2018.    GREGG López  10/01/18  8:23 AM                  Electronically signed by Bryan Kirk PA at 10/1/2018  8:25 AM     Shane Bosch MD at 9/30/2018 11:48 AM              1 Day Post-Op       LOS: 0 days   Patient Care Team:  Justin Casas APRN as PCP - General (Family Medicine)    Chief complaint: Coronary artery disease    Subjective   Denies chest pain, denies shortness of breath    Objective    Vital Signs  Temp:  [97.9 °F (36.6 °C)-98.6 °F (37 °C)] 98 °F (36.7 °C)  Heart Rate:  [61-87] 85  Resp:  [16-18] 16  BP: (108-131)/(67-79) 121/71    Physical Exam:   General Appearance: alert, appears stated age and cooperative   Lungs: clear bilaterally   Heart: Regular rate and rhythm       Results     Results from last 7 days  Lab Units 09/29/18  0600   WBC 10*3/mm3 5.50   HEMOGLOBIN g/dL 10.3*   HEMATOCRIT % 30.1*   PLATELETS 10*3/mm3 273       Results from last 7 days  Lab Units 09/29/18  0600  "  SODIUM mmol/L 141   POTASSIUM mmol/L 4.1   CHLORIDE mmol/L 105   CO2 mmol/L 28.0   BUN mg/dL 14   CREATININE mg/dL 0.85   GLUCOSE mg/dL 69*   CALCIUM mg/dL 8.7               Assessment    Active Problems:    Unstable angina (CMS/HCC)    Coronary artery disease involving native coronary artery of native heart with unstable angina pectoris (CMS/HCC)    P2 Y 12/  189  Plan   Await normalization of P2 Y 12 for coronary artery bypass grafting    Chivo Hamilton PA-C  09/30/18  11:48 AM   Stable. No chest pain.  CABG on Tuesday 10/02/2018. I have reviewed, verified, and confirmed the above history and current status.  I have examined the patient and confirmed the above physical findings.Above plan and treatment regimen discussed in detail with patient.  Options of treatment, attendant risks vs benefits, and my recommendations were discussed and all questions answered.    Shane Bosch MD  CTSurgery  09/30/18   2:09 PM      Electronically signed by Shane Bosch MD at 9/30/2018  2:09 PM     Nadir Tanner MD at 9/30/2018  8:30 AM            [facility]  PROGRESS NOTE    Date of Admission: 9/28/2018  Length of Stay: 0  Primary Care Physician: Justin Casas, DANIEL    Chief Complaint: f/u CAD with unstable angina   Problem List:   1. Coronary artery disease:  a. History of \"small heart attack,\" in 1999.  b. Subsequent cardiac catheterization study followed by 2 coronary stents; incomplete database.  c. Had several stress tests with his primary care physician during 3814-5309, which were reportedly unremarkable.  d. Recurrent atypical chest pain, fall 2014.  e. Cardiolite stress test, 09/23/2014, was normal with excellent/above average exercise capacity and ejection fraction 55%.  f. Echocardiogram, 09/23/2014, showed ejection fraction 65% with mild mitral and mild  tricuspid regurgitation.   g. Now presents with recurrent chest pain (October 2017)  h. MPS 10/31/2017: " "Excellent exercise capacity with normal hemodynamic response to exercise. Expected exercise time 8:50. Actual exercise 10:15. THR achieved at 5:33. DOROTEO -15. 98% of MPHR. Negative treadmill stress test for anginal chest pain. Abnormal stress ECG showing ischemic ST segment depressions in inferior and anterolateral leads which persisted beyond 6 minutes of recovery. Myocardial perfusion imaging indicates a small-to-medium-sized infarct located in the apex with no significant ischemia noted. Left ventricular ejection fraction is normal (Calculated EF = 65%).  i. [facility] 9/29/2018: mildly abnormal LV gram with EF 60% and mild apical hypokinesia, severe 3 vessel disease   2. Hypertension.   3. Dyslipidemia.   4. Type 2 diabetes.   5. Remote appendectomy with partial bowel resection due to ruptured appendix.        Subjective      Patient asymptomatic, without chest pain, dyspnea. Ambulating       Objective   Vitals: /75   Pulse 70   Temp 98 °F (36.7 °C) (Oral)   Resp 16   Ht 165.1 cm (65\")   Wt 71.8 kg (158 lb 3.2 oz)   SpO2 95%   BMI 26.33 kg/m²      Physical Exam:  GENERAL: Alert, cooperative, in no acute distress.   HEENT: Normocephalic, no jugular venous distention  HEART: No discrete PMI is noted. Regular rhythm, normal rate, and no murmurs, gallops, or rubs.   LUNGS: Clear to auscultation bilaterally. No wheezing, rales or rhonchi.  ABDOMEN: Soft, bowel sounds present, non-tender   NEUROLOGIC: No focal abnormalities involving strength or sensation are noted.   EXTREMITIES: No clubbing, cyanosis, or edema noted. Sheath site stable with no bleeding, bruising or hematoma    Results:    Results from last 7 days  Lab Units 09/29/18  0600 09/28/18  1421 09/24/18 2023   WBC 10*3/mm3 5.50 8.66 7.63   HEMOGLOBIN g/dL 10.3* 12.2* 11.4*   HEMATOCRIT % 30.1* 35.8* 33.3*   PLATELETS 10*3/mm3 273 360 302       Results from last 7 days  Lab Units 09/29/18  0600 09/28/18  1421 09/24/18 2023   SODIUM mmol/L 141 139 145 "   POTASSIUM mmol/L 4.1 4.1 3.9   CHLORIDE mmol/L 105 100 106   CO2 mmol/L 28.0 24.0 27.0   BUN mg/dL 14 12 12   CREATININE mg/dL 0.85 0.94 0.95   GLUCOSE mg/dL 69* 97 124*      Lab Results   Component Value Date    CHOL 91 09/29/2018    TRIG 98 09/29/2018    HDL 26 (L) 09/29/2018    LDL 44 09/29/2018    AST 29 09/28/2018    ALT 23 09/28/2018       Results from last 7 days  Lab Units 09/29/18  0600   HEMOGLOBIN A1C % 6.30*       Results from last 7 days  Lab Units 09/29/18  0600   CHOLESTEROL mg/dL 91   TRIGLYCERIDES mg/dL 98   HDL CHOL mg/dL 26*   LDL CHOL mg/dL 44       Results from last 7 days  Lab Units 09/29/18  0600   TSH mIU/mL 1.813       Results from last 7 days  Lab Units 09/24/18  2023   BNP pg/mL <2.0       Results from last 7 days  Lab Units 09/28/18  1421   PROTIME Seconds 10.1   INR  0.96       Results from last 7 days  Lab Units 09/29/18  0600 09/28/18  1730 09/28/18  1421   TROPONIN I ng/mL <0.006 <0.006 <0.006       Intake/Output Summary (Last 24 hours) at 09/30/18 0830  Last data filed at 09/30/18 0345   Gross per 24 hour   Intake              900 ml   Output             1575 ml   Net             -675 ml     I personally reviewed the patient's EKG/Telemetry data    Radiology Data:   [facility] 9/29/2018:  Final Impression: #1: Mildly abnormal left ventriculogram with an estimated ejection fraction of 60% and mild apical hypokinesia.                              #2: There is severe 3 vessel disease that is associated with an acute coronary syndrome in a younger diabetic.  He will be referred for coronary bypass surgery.    Carotid duplex 9/29/2018:  Interpretation Summary     · There is bilateral internal carotid artery narrowing of <50%.  · Bilateral vertebral artery flow is antegrade.          Current Medications:    aspirin 81 mg Oral Daily   atorvastatin 40 mg Oral Nightly   glipiZIDE 2.5 mg Oral QAM AC   heparin (porcine) 5,000 Units Subcutaneous Q8H   insulin lispro 0-7 Units Subcutaneous 4x Daily  "With Meals & Nightly   lisinopril 10 mg Oral Q24H   nebivolol 5 mg Oral Q24H          Assessment and Plan:     1. CAD with unstable angina   - LHC demonstrated severe 3 vessel disease  - normal EF  - CTS has seen  - plan for CABG per CTS ([id number] today).     2. HTN  - well controlled     3. HLD  - LDL 44 on Lipitor 40mg     4. T2DM  - Glucoses \"OK\" -- metformin on hold post contrast. Admission glycohemoglobin=6.3    INadir MD, personally performed the services described as documented by the above named individual. I have made any necessary edits and it is both accurate and complete 9/30/2018  10:24 AM         Scribed for Nadir Tanner MD by Heidi Cooper PA-C.      Electronically signed by Nadir Tanner MD at 9/30/2018 10:24 AM     Nadir Tanner MD at 9/29/2018  8:55 AM            [facility] at [facility]  PROGRESS NOTE    Date of Admission: 9/28/2018  Length of Stay: 0  Primary Care Physician: Justin Casas APRN    Chief Complaint: f/u CAD with chest pain   Problem List:   1. Coronary artery disease:  a.  History of \"small heart attack,\" in 1999.  b. Subsequent cardiac catheterization study followed by 2 coronary stents; incomplete database.  c. Had several stress tests with his primary care physician during 9434-3012, which were reportedly unremarkable.  d.  Recurrent atypical chest pain, fall 2014.  e. Cardiolite stress test, 09/23/2014, was normal with excellent/above average exercise capacity and ejection fraction 55%.  f. Echocardiogram, 09/23/2014, showed ejection fraction 65% with mild mitral and mild  tricuspid regurgitation.   g. Now presents with recurrent chest pain (October 2017)  h. MPS 10/31/2017: Excellent exercise capacity with normal hemodynamic response to exercise. Expected exercise time 8:50. Actual exercise 10:15. THR achieved at 5:33. DOROTEO -15. 98% of MPHR. Negative treadmill stress test for anginal chest pain. Abnormal stress ECG " "showing ischemic ST segment depressions in inferior and anterolateral leads which persisted beyond 6 minutes of recovery. Myocardial perfusion imaging indicates a small-to-medium-sized infarct located in the apex with no significant ischemia noted. Left ventricular ejection fraction is normal (Calculated EF = 65%).  2. Hypertension.   3. Dyslipidemia.   4. Type 2 diabetes.   5. Remote appendectomy with partial bowel resection due to ruptured appendix.        Subjective      Patient feels well, his chest pain has now resolved. Awaiting heart cath today.      Objective   Vitals: BP (!) 88/55 (BP Location: Right arm, Patient Position: Lying)   Pulse 60   Temp 98.2 °F (36.8 °C) (Oral)   Resp 16   Ht 165.1 cm (65\")   Wt 71.8 kg (158 lb 3.2 oz)   SpO2 96%   BMI 26.33 kg/m²      Physical Exam:  GENERAL: Alert, cooperative, in no acute distress.   HEENT: Normocephalic, no jugular venous distention  HEART: No discrete PMI is noted. Regular rhythm, normal rate, and no murmurs, gallops, or rubs.   LUNGS: Clear to auscultation bilaterally. No wheezing, rales or rhonchi.  ABDOMEN: Soft, bowel sounds present, non-tender   NEUROLOGIC: No focal abnormalities involving strength or sensation are noted.   EXTREMITIES: No clubbing, cyanosis, or edema noted.     Results:    Results from last 7 days  Lab Units 09/29/18  0600 09/28/18  1421 09/24/18 2023   WBC 10*3/mm3 5.50 8.66 7.63   HEMOGLOBIN g/dL 10.3* 12.2* 11.4*   HEMATOCRIT % 30.1* 35.8* 33.3*   PLATELETS 10*3/mm3 273 360 302       Results from last 7 days  Lab Units 09/29/18  0600 09/28/18  1421 09/24/18 2023   SODIUM mmol/L 141 139 145   POTASSIUM mmol/L 4.1 4.1 3.9   CHLORIDE mmol/L 105 100 106   CO2 mmol/L 28.0 24.0 27.0   BUN mg/dL 14 12 12   CREATININE mg/dL 0.85 0.94 0.95   GLUCOSE mg/dL 69* 97 124*      Lab Results   Component Value Date    CHOL 91 09/29/2018    TRIG 98 09/29/2018    HDL 26 (L) 09/29/2018    LDL 44 09/29/2018    AST 29 09/28/2018    ALT 23 " 09/28/2018       Results from last 7 days  Lab Units 09/29/18  0600   HEMOGLOBIN A1C % 6.30*       Results from last 7 days  Lab Units 09/29/18  0600   CHOLESTEROL mg/dL 91   TRIGLYCERIDES mg/dL 98   HDL CHOL mg/dL 26*   LDL CHOL mg/dL 44       Results from last 7 days  Lab Units 09/29/18  0600   TSH mIU/mL 1.813       Results from last 7 days  Lab Units 09/24/18  2023   BNP pg/mL <2.0       Results from last 7 days  Lab Units 09/28/18  1421   PROTIME Seconds 10.1   INR  0.96       Results from last 7 days  Lab Units 09/29/18  0600 09/28/18  1730 09/28/18  1421   TROPONIN I ng/mL <0.006 <0.006 <0.006       Intake/Output Summary (Last 24 hours) at 09/29/18 0855  Last data filed at 09/29/18 0441   Gross per 24 hour   Intake                0 ml   Output              125 ml   Net             -125 ml     I personally reviewed the patient's EKG/Telemetry data    Radiology Data:   CXR 9/28/2018:   FINDINGS: Cardiac size within normal limits. Increased perihilar lung  markings with peribronchial cuffing consistent with peribronchial  inflammatory process, however, no focal opacification or consolidation  otherwise. No pneumothorax or pleural effusion. Degenerative changes of  the spine.     IMPRESSION:  Perihilar prominence of lung markings consistent with  peribronchial inflammatory process such as reactive airways disease  and/or bronchitis, however, no focal consolidation or overt edema.    Current Medications:    aspirin 81 mg Oral Daily   atorvastatin 80 mg Oral Nightly   clopidogrel 75 mg Oral Daily   heparin (porcine) 5,000 Units Subcutaneous Q8H   insulin lispro 0-7 Units Subcutaneous 4x Daily With Meals & Nightly   lisinopril 10 mg Oral Q24H   nebivolol 5 mg Oral Q24H          Assessment and Plan:   1. CAD with chest pain  - negative troponins and EKG's  - plan for LHC +/- CBI today due to recent escalation of symptoms prompting 2 ER visits, as well as some features consistent with his previous angina  - on ASA,  Plavix and Lipitor    2. HTN  - well controlled    3. HLD  - LDL 44 on Lipitor 40mg     4. Abnormal CXR  - will repeat PA and lateral later today    I, Nadir Tanner MD, personally performed the services described as documented by the above named individual. I have made any necessary edits and it is both accurate and complete 9/29/2018  11:36 AM        Scribed for Nadir Tanner MD by Heidi Cooper PA-C.      Electronically signed by Nadir Tanner MD at 9/29/2018 11:36 AM          Consult Notes (last 72 hours) (Notes from 9/28/2018 12:43 PM through 10/1/2018 12:43 PM)      Shane Bosch MD at 9/29/2018  3:27 PM      Consult Orders:    1. Inpatient Cardiothoracic Surgery Consult [[id number]] ordered by Nadir Tanner MD at 09/29/18 1319                CTS Consult  Judah Cerna  [id number]  1955    Patient Care Team:  Justin Casas APRN as PCP - General (Family Medicine)    CC: I have been having chest pain      Reason for Consult:  3 vessel CAD    HPI: 62-year-old -American male a significant past history of coronary artery disease (previous stent ×2).  Complains of recent onset right sided chest pain which he describes as burning in nature associated with exercise.  Also has shortness of breath but denies nausea and vomiting he has not had syncope.  These symptoms have been present for at least 2 weeks.  His symptoms have progressed during that period of time patient is a nonsmoker.  He has ruled out for R cardial infarction.    Active Problems:    Unstable angina (CMS/HCC)    Coronary artery disease involving native coronary artery of native heart with unstable angina pectoris (CMS/HCC)      Review of Systems:  General: No anorexia, no weight loss, (+)general malaise and weakness.  No fever chills or night sweats.  HEENT: No headaches no visual changes no hearing loss no rhinitis no pharyngitis  Pulmonary: (+) shortness of breath, no cough, no hemoptysis  Heart: (+)  palpitations,  (+) malaise, (+) shortness of breath, no atrial fibrillation, no bradycardia, no syncope.  (+) anginal quality chest pain.  Gastrointestinal: No nausea, vomiting, diarrhea, or constipation. No acholic stool, no jaundice.  Renal: No dysuria, no frequency, no hematuria.  Skin: No rash, no skin lesions, no skin tumors.  Neurologic: No seizures, no muscle weakness, no sensory deficit, no amaurosis,  Psychiatric: No anxiety, no history of psychosis  Hematologic: No bleeding history, no ease of bruising, no history of blood disorder.  All other systems were reviewed and are negative.    History  Past Medical History:   Diagnosis Date   • CAD (coronary artery disease)    • Dyslipidemia    • Heart attack    • Hyperlipidemia    • Hypertension    • Type 2 diabetes mellitus (CMS/HCC)      Past Surgical History:   Procedure Laterality Date   • APPENDECTOMY      w/ partial bowel resection due to ruptured appendix   • CARDIAC CATHETERIZATION     • CORONARY STENT PLACEMENT      x 2     History reviewed. No pertinent family history.  Social History   Substance Use Topics   • Smoking status: Never Smoker   • Smokeless tobacco: Never Used   • Alcohol use No     Prescriptions Prior to Admission   Medication Sig Dispense Refill Last Dose   • ALPRAZolam (XANAX) 0.5 MG tablet Take 0.5 mg by mouth Daily As Needed.   Past Week at Unknown time   • atorvastatin (LIPITOR) 40 MG tablet Take 40 mg by mouth Daily.   9/27/2018 at Unknown time   • diclofenac (VOLTAREN) 50 MG EC tablet Take 1 tablet by mouth 3 (Three) Times a Day. 21 tablet 0 9/28/2018 at Unknown time   • glimepiride (AMARYL) 4 MG tablet Take 4 mg by mouth Every Morning Before Breakfast.   Taking   • HYDROcodone-acetaminophen (NORCO) 5-325 MG per tablet Take 1 tablet by mouth 2 (Two) Times a Day As Needed (Knee Pain).   Past Week at Unknown time   • lisinopril (PRINIVIL,ZESTRIL) 10 MG tablet Take 10 mg by mouth Daily.   9/27/2018 at Unknown time   • metFORMIN  (GLUCOPHAGE) 1000 MG tablet Take 1,000 mg by mouth Daily With Breakfast.   9/27/2018 at Unknown time   • nebivolol (BYSTOLIC) 5 MG tablet Take 5 mg by mouth Daily.   9/28/2018 at Unknown time   • nystatin (MYCOSTATIN) 889862 UNIT/ML suspension Swish and swallow 500,000 Units 2 (Two) Times a Day As Needed.   9/27/2018 at Unknown time     Allergies:  Patient has no known allergies.    Objective    Vital Signs  Temp:  [97.3 °F (36.3 °C)-98.4 °F (36.9 °C)] 97.9 °F (36.6 °C)  Heart Rate:  [55-84] 66  Resp:  [12-18] 17  BP: ()/(55-77) 130/74    Physical Exam:  General Appearance: alert, appears stated age and cooperative  Head: normocephalic, without obvious abnormality and atraumatic  Ears/Nose: no rhinitis, external ears normal  Throat:  no oral lesions, no pharyngitis  Neck: no adenopathy, suppple, trachea midline, no thyromegaly, no carotid bruit and no JVD  Lungs: clear to auscultation, respirations regular, respirations even and respirations unlabored  Heart: regular rhythm & normal rate, normal S1, S2, no murmur  Abdomen: normal bowel sounds, no masses, soft, non-tender  Extremities: moves extremities well and no edema  Pulses: pulses palpable and equal bilaterally (femoral, DP, PT)  Skin: no bleeding, bruising or rash  Neurologic: mental status orientated to person, place, time and situation, CN intact, no motor or sensory loss          Data Review:    Results from last 7 days  Lab Units 09/29/18  0600   WBC 10*3/mm3 5.50   HEMOGLOBIN g/dL 10.3*   HEMATOCRIT % 30.1*   PLATELETS 10*3/mm3 273       Results from last 7 days  Lab Units 09/29/18  0600   SODIUM mmol/L 141   POTASSIUM mmol/L 4.1   CHLORIDE mmol/L 105   CO2 mmol/L 28.0   BUN mg/dL 14   CREATININE mg/dL 0.85   GLUCOSE mg/dL 69*   CALCIUM mg/dL 8.7     Coagulation:   Lab Results   Component Value Date    INR 0.96 09/28/2018     Cardiac markers:     ABGs:       Invalid input(s): PO2, PCO2      Imaging Results (last 72 hours)     Procedure Component  "Value Units Date/Time    XR Chest 2 View [[id number]] Collected:  18 1625     Updated:  18 1625    Narrative:       EXAMINATION: XR CHEST 2 VW-2018:      INDICATION: Chest pain.      COMPARISON: 2018.     FINDINGS: Cardiac size within normal limits. Increased perihilar lung  markings with peribronchial cuffing consistent with peribronchial  inflammatory process, however, no focal opacification or consolidation  otherwise. No pneumothorax or pleural effusion. Degenerative changes of  the spine.       Impression:       Perihilar prominence of lung markings consistent with  peribronchial inflammatory process such as reactive airways disease  and/or bronchitis, however, no focal consolidation or overt edema.     D:  2018  E:  2018                     Imaging: Cardiac catheterization reviewed and discussed with Dr. Nadir Tanner.        Assessment:  Three-vessel coronary artery disease      Plan:  Schedule coronary artery bypass grafting as soon as P2Y12 normalizes. I have reviewed, verified, and confirmed the above history and current status.  I have examined the patient and confirmed the above physical findings.Above plan and treatment regimen discussed in detail with patient.  Options of treatment, attendant risks vs benefits, and my recommendations were discussed and all questions answered.      Shane Bosch MD  18  3:28 PM    Electronically signed by Shane Bosch MD at 2018  3:35 PM     Nadir Tanner MD at 2018  4:38 PM          [facility] at [facility]  CARDIOLOGY CONSULTATION NOTE    Judah Cerna  : 1955  MRN:[id number]    Date of Consultation: 18    PCP: Justin Casas, DANIEL    IDENTIFICATION: A 62 y.o. male resident of [place], [place]     Chief Complaint   Patient presents with   • Chest Pain     PROBLEM LIST:   6. Coronary artery disease:  a.  History of \"small heart attack,\" in .  b. Subsequent cardiac " "catheterization study followed by 2 coronary stents; incomplete database.  c. Had several stress tests with his primary care physician during 4314-3794, which were reportedly unremarkable.  d.  Recurrent atypical chest pain, fall 2014.  e. Cardiolite stress test, 09/23/2014, was normal with excellent/above average exercise capacity and ejection fraction 55%.  f. Echocardiogram, 09/23/2014, showed ejection fraction 65% with mild mitral and mild  tricuspid regurgitation.   g. Now presents with recurrent chest pain (October 2017)  h. MPS 10/31/2017: Excellent exercise capacity with normal hemodynamic response to exercise. Expected exercise time 8:50. Actual exercise 10:15. THR achieved at 5:33. DOROTEO -15. 98% of MPHR. Negative treadmill stress test for anginal chest pain. Abnormal stress ECG showing ischemic ST segment depressions in inferior and anterolateral leads which persisted beyond 6 minutes of recovery. Myocardial perfusion imaging indicates a small-to-medium-sized infarct located in the apex with no significant ischemia noted. Left ventricular ejection fraction is normal (Calculated EF = 65%).  7. Hypertension.   8. Dyslipidemia.   9. Type 2 diabetes.   10. Remote appendectomy with partial bowel resection due to ruptured appendix.     ALLERGIES: No Known Allergies    HPI:  Mr. Cerna is a pleasant 61 y/o AAM with above noted history who presents to the ED with complaints of chest pain. He was seen in the ED on 9/25 for similar complaints and at the time ruled out for MI. He tried to walk on a treadmill the following day and notes symptoms of right sided chest burning with exercise. He also has had sharp stabbing chest pain \"that moves\" all over his chest area for about 2 weeks, however this has gotten worse over that time period. He denies fever, chills or cough. He has also noticed increased dyspnea with exercise. He does not smoke and denies sick contacts. His previous angina was heaviness and chest burning. " "His troponins are negative x2 and EKG's show no acute ischemic changes.       ROS: All systems have been reviewed and are negative with the exception of those mentioned in the HPI and problem list above.    Surgical History:   Past Surgical History:   Procedure Laterality Date   • APPENDECTOMY      w/ partial bowel resection due to ruptured appendix   • CARDIAC CATHETERIZATION     • CORONARY STENT PLACEMENT      x 2       Social History:   Social History     Social History   • Marital status:      Spouse name: N/A   • Number of children: N/A   • Years of education: N/A     Occupational History   • Not on file.     Social History Main Topics   • Smoking status: Never Smoker   • Smokeless tobacco: Never Used   • Alcohol use No   • Drug use: No   • Sexual activity: Defer       Family History: History reviewed. No pertinent family history.    Objective     /77   Pulse 63   Temp 98.5 °F (36.9 °C)   Resp 18   Ht 165.1 cm (65\")   Wt 72.6 kg (160 lb)   SpO2 100%   BMI 26.63 kg/m²    No intake or output data in the 24 hours ending 09/28/18 1638    PHYSICAL EXAM:  CONSTITUTIONAL: Well nourished, cooperative, in no acute distress  HEENT: Normocephalic, atraumatic, PERRLA, no JVD, no carotid bruit  CARDIOVASCULAR:  Regular rhythm and normal rate, no murmur, gallop, rub. Peripheral pulses are present and equal bilaterally  RESPIRATORY: Clear to auscultation, normal respiratory effort, no wheezing, rales or rhonchi  GI: Soft, nontender, normal bowel sounds  MUSCULOSKELETAL: No gross deformities, no edema  SKIN: Warm, dry. No bleeding, bruising or rash  NEUROLOGICAL: No focal deficits  PSYCHIATRIC: Normal mood and affect. Behavior is normal     Labs/Diagnostic Data    Results from last 7 days  Lab Units 09/28/18  1421 09/24/18 2023   SODIUM mmol/L 139 145   POTASSIUM mmol/L 4.1 3.9   CHLORIDE mmol/L 100 106   CO2 mmol/L 24.0 27.0   BUN mg/dL 12 12   CREATININE mg/dL 0.94 0.95   GLUCOSE mg/dL 97 124*   CALCIUM " "mg/dL 9.3 9.3       Results from last 7 days  Lab Units 09/28/18  1421   TROPONIN I ng/mL <0.006       Results from last 7 days  Lab Units 09/28/18  1421 09/24/18  2023   WBC 10*3/mm3 8.66 7.63   HEMOGLOBIN g/dL 12.2* 11.4*   HEMATOCRIT % 35.8* 33.3*   PLATELETS 10*3/mm3 360 302       Results from last 7 days  Lab Units 09/28/18  1421   PROTIME Seconds 10.1   INR  0.96     I personally reviewed the patient's EKG/Telemetry data    Radiology Data:   CXR 9/28/2018:  FINDINGS: Cardiac size within normal limits. Increased perihilar lung  markings with peribronchial cuffing consistent with peribronchial  inflammatory process, however, no focal opacification or consolidation  otherwise. No pneumothorax or pleural effusion. Degenerative changes of  the spine.     IMPRESSION:  Perihilar prominence of lung markings consistent with  peribronchial inflammatory process such as reactive airways disease  and/or bronchitis, however, no focal consolidation or overt edema.      Assessment and Plan:     1. CAD with mixed features  - atypical component of sharp chest pain that \"moves\", however also has chest burning with exercise which is consistent with his previous angina   - troponins negative x2, EKG's show no acute ischemic changes  - due to recent escalation of symptoms with history of CAD, he will be admitted for observation and we will plan for catheterization studies in the AM  - all discussed with the patient who is in agreement    2. HTN  - well controlled    3. HLD  - check lipid panel    4. Abnormal CXR  - will repeat CXR tomorrow and watch for clinical signs of infectious process     I, Nadir Tanner MD, personally performed the services described as documented by the above named individual. I have made any necessary edits and it is both accurate and complete 9/28/2018  6:56 PM        Scribed for Nadir Tanner MD by Heidi Cooper PA-C. 9/28/2018  4:38 PM      Thank you for allowing me to participate in the " care of Judah Cerna. Feel free to contact me directly with any further questions or concerns.      Electronically signed by Nadir Tanner MD at 9/28/2018  7:27 PM

## 2018-10-01 NOTE — PROGRESS NOTES
Continued Stay Note  [facility]     Patient Name: Judah Cerna  MRN: [id number]  Today's Date: 10/1/2018    Admit Date: 9/28/2018          Discharge Plan     Row Name 10/01/18 1415       Plan    Plan Home with family    Plan Comments CABG planned for tomorrow, 10/2. Patient hopes to return home with family at discharge. CM will continue to follow. Tabitha Bowers RN [phone]              Discharge Codes    No documentation.       Expected Discharge Date and Time     Expected Discharge Date Expected Discharge Time    Oct 5, 2018             Tabitha Bowers RN

## 2018-10-01 NOTE — PROGRESS NOTES
"[facility] at [facility]  IP Progress Note   LOS: 0 days   Patient Care Team:  Justin Casas APRN as PCP - General (Family Medicine)    Chief Complaint: Follow up for Coronary Artery Disease    Subjective    Sitting up in chair, has been ambulating without difficulty.  Denies chest pain or shortness of breath.      Problem   Unstable Angina ([facility])   Cad (Coronary Artery Disease)    a. -History of \"small heart attack,\" in 1999. cardiac catheterization study followed by 2 coronary stents; incomplete database.  b. Cardiolite stress test, 09/23/2014, was normal with excellent/above average exercise capacity and ejection fraction 55%.  c. Echocardiogram, 09/23/2014, showed ejection fraction 65% with mild mitral and mild tricuspid regurgitation.   d. [facility] 9-29-18:  · Mildly abnormal left ventriculogram with an estimated ejection fraction of 60% and mild apical hypokinesia  · Severe 3 vessel disease that is associated with an acute coronary syndrome in a younger diabetic.     Abnormal Ekg   Hypertension   Dyslipidemia   Type 2 Diabetes Mellitus ([facility])       Tele: Sinus Rythym    Vitals:  Blood pressure 113/72, pulse 81, temperature 98 °F (36.7 °C), temperature source Oral, resp. rate 16, height 165.1 cm (65\"), weight 71.8 kg (158 lb 3.2 oz), SpO2 97 %.     Intake/Output Summary (Last 24 hours) at 10/01/18 0911  Last data filed at 09/30/18 2330   Gross per 24 hour   Intake                0 ml   Output              325 ml   Net             -325 ml       Physical Exam:    General: alert, no acute distress, acyanotic, well developed, well nourished   Chest: Clear Auscultation and No Wheezes   CV: Heart sounds are normal.  Regular rate and rhythm without murmur, gallop or rub.   Extremities: negative, groin intact.    Results Review:     I reviewed the patient's new clinical results.      Results from last 7 days  Lab Units 10/01/18  0828   WBC 10*3/mm3 7.04   HEMOGLOBIN g/dL 12.6*   HEMATOCRIT " % 36.7*   PLATELETS 10*3/mm3 293       Results from last 7 days  Lab Units 09/29/18  0600 09/28/18  1421   SODIUM mmol/L 141 139   POTASSIUM mmol/L 4.1 4.1   CHLORIDE mmol/L 105 100   CO2 mmol/L 28.0 24.0   BUN mg/dL 14 12   CREATININE mg/dL 0.85 0.94   CALCIUM mg/dL 8.7 9.3   BILIRUBIN mg/dL  --  1.4*   ALK PHOS U/L  --  67   ALT (SGPT) U/L  --  23   AST (SGOT) U/L  --  29   GLUCOSE mg/dL 69* 97         Scheduled Meds:  [START ON 10/2/2018] aspirin 81 mg Oral Daily   atorvastatin 40 mg Oral Nightly   chlorhexidine 15 mL Mouth/Throat Q12H   glipiZIDE 2.5 mg Oral QAM AC   insulin lispro 0-7 Units Subcutaneous 4x Daily With Meals & Nightly   lisinopril 10 mg Oral Q24H   [START ON 10/2/2018] metoprolol tartrate 12.5 mg Oral On Call to OR   mupirocin 1 application Each Nare Q12H   nebivolol 5 mg Oral Q24H   pharmacy consult - MTM  Does not apply Daily     Assessment and plan:  1. CAD with unstable angina   - [facility] demonstrated severe 3 vessel disease  - plan for CABG per CTS on Tuesday     2. HTN  - well controlled     3. HLD  - LDL 44 on Lipitor 40mg      GREGG Amaya  10/01/18  9:11 AM      I have seen and examined the patient, case was discussed with the physician extender, reviewed the above note, necessary changes were made and I agree with the final note.   Kay Stevens MD, FACC, [facility]

## 2018-10-01 NOTE — RESEARCH
RISK SCORES    Procedure: CAB Only     Risk of Mortality: 0.417%   Morbidity or Mortality: 6.88%   Long Length of Stay: 2.208%   Short Length of Stay: 65.05%   Permanent Stroke: 0.908%   Prolonged Ventilation: 4.167%   DSW Infection: 0.208%   Renal Failure: 1.122%   Reoperation: 3.333%      Shane Bosch MD  CTSurgery  10/01/18   1:56 PM

## 2018-10-01 NOTE — PLAN OF CARE
Problem: Patient Care Overview  Goal: Plan of Care Review  Outcome: Ongoing (interventions implemented as appropriate)   10/01/18 9051   Coping/Psychosocial   Plan of Care Reviewed With patient   Plan of Care Review   Progress no change   OTHER   Outcome Summary VSS. NSR on monitor. npo after mn for cabg tomorrow. no c/p's of chest pain noted today . .       Problem: Cardiac: ACS (Acute Coronary Syndrome) (Adult)  Goal: Signs and Symptoms of Listed Potential Problems Will be Absent, Minimized or Managed (Cardiac: ACS)  Outcome: Ongoing (interventions implemented as appropriate)

## 2018-10-02 ENCOUNTER — ANESTHESIA (OUTPATIENT)
Dept: PERIOP | Facility: HOSPITAL | Age: 63
End: 2018-10-02

## 2018-10-02 ENCOUNTER — APPOINTMENT (OUTPATIENT)
Dept: GENERAL RADIOLOGY | Facility: HOSPITAL | Age: 63
End: 2018-10-02

## 2018-10-02 LAB
ACT BLD: 125 SECONDS (ref 82–152)
ACT BLD: 125 SECONDS (ref 82–152)
ACT BLD: 131 SECONDS (ref 82–152)
ACT BLD: 433 SECONDS (ref 82–152)
ACT BLD: 494 SECONDS (ref 82–152)
ACT BLD: 543 SECONDS (ref 82–152)
ACT BLD: 566 SECONDS (ref 82–152)
ACT BLD: 681 SECONDS (ref 82–152)
ACT BLD: 698 SECONDS (ref 82–152)
ALBUMIN SERPL-MCNC: 3.48 G/DL (ref 3.2–4.8)
ALBUMIN SERPL-MCNC: 4.08 G/DL (ref 3.2–4.8)
ALBUMIN SERPL-MCNC: 4.29 G/DL (ref 3.2–4.8)
AMPHET+METHAMPHET UR QL: NEGATIVE
AMPHETAMINES UR QL: NEGATIVE
ANION GAP SERPL CALCULATED.3IONS-SCNC: 11 MMOL/L (ref 3–11)
ANION GAP SERPL CALCULATED.3IONS-SCNC: 6 MMOL/L (ref 3–11)
ANION GAP SERPL CALCULATED.3IONS-SCNC: 7 MMOL/L (ref 3–11)
APTT PPP: 25.9 SECONDS (ref 24–31)
APTT PPP: 27.2 SECONDS (ref 24–31)
ARTERIAL PATENCY WRIST A: ABNORMAL
ARTERIAL PATENCY WRIST A: ABNORMAL
ATMOSPHERIC PRESS: ABNORMAL MMHG
ATMOSPHERIC PRESS: ABNORMAL MMHG
BARBITURATES UR QL SCN: NEGATIVE
BASE EXCESS BLDA CALC-SCNC: -1.5 MMOL/L (ref 0–2)
BASE EXCESS BLDA CALC-SCNC: 0.5 MMOL/L (ref 0–2)
BASE EXCESS BLDA CALC-SCNC: 1 MMOL/L (ref -5–5)
BASE EXCESS BLDA CALC-SCNC: 1 MMOL/L (ref -5–5)
BASE EXCESS BLDA CALC-SCNC: 12 MMOL/L (ref -5–5)
BASE EXCESS BLDA CALC-SCNC: 2 MMOL/L (ref -5–5)
BASE EXCESS BLDA CALC-SCNC: 3 MMOL/L (ref -5–5)
BASE EXCESS BLDA CALC-SCNC: 4 MMOL/L (ref -5–5)
BASE EXCESS BLDA CALC-SCNC: 8 MMOL/L (ref -5–5)
BASE EXCESS BLDA CALC-SCNC: 9 MMOL/L (ref -5–5)
BDY SITE: ABNORMAL
BDY SITE: ABNORMAL
BENZODIAZ UR QL SCN: NEGATIVE
BUN BLD-MCNC: 13 MG/DL (ref 9–23)
BUN BLD-MCNC: 14 MG/DL (ref 9–23)
BUN BLD-MCNC: 16 MG/DL (ref 9–23)
BUN/CREAT SERPL: 12.6 (ref 7–25)
BUN/CREAT SERPL: 13.8 (ref 7–25)
BUN/CREAT SERPL: 14.5 (ref 7–25)
BUPRENORPHINE SERPL-MCNC: NEGATIVE NG/ML
CA-I BLDA-SCNC: 1.06 MMOL/L (ref 1.2–1.32)
CA-I BLDA-SCNC: 1.11 MMOL/L (ref 1.2–1.32)
CA-I BLDA-SCNC: 1.11 MMOL/L (ref 1.2–1.32)
CA-I BLDA-SCNC: 1.12 MMOL/L (ref 1.2–1.32)
CA-I BLDA-SCNC: 1.14 MMOL/L (ref 1.2–1.32)
CA-I BLDA-SCNC: 1.14 MMOL/L (ref 1.2–1.32)
CA-I BLDA-SCNC: 1.21 MMOL/L (ref 1.2–1.32)
CA-I BLDA-SCNC: 1.28 MMOL/L (ref 1.2–1.32)
CA-I SERPL ISE-MCNC: 1.17 MMOL/L (ref 1.12–1.32)
CA-I SERPL ISE-MCNC: 1.19 MMOL/L (ref 1.12–1.32)
CALCIUM SPEC-SCNC: 7.8 MG/DL (ref 8.7–10.4)
CALCIUM SPEC-SCNC: 7.9 MG/DL (ref 8.7–10.4)
CALCIUM SPEC-SCNC: 8.5 MG/DL (ref 8.7–10.4)
CANNABINOIDS SERPL QL: NEGATIVE
CHLORIDE SERPL-SCNC: 107 MMOL/L (ref 99–109)
CHLORIDE SERPL-SCNC: 111 MMOL/L (ref 99–109)
CHLORIDE SERPL-SCNC: 113 MMOL/L (ref 99–109)
CO2 BLDA-SCNC: 24.9 MMOL/L (ref 22–33)
CO2 BLDA-SCNC: 25.5 MMOL/L (ref 22–33)
CO2 BLDA-SCNC: 26 MMOL/L (ref 24–29)
CO2 BLDA-SCNC: 27 MMOL/L (ref 24–29)
CO2 BLDA-SCNC: 29 MMOL/L (ref 24–29)
CO2 BLDA-SCNC: 32 MMOL/L (ref 24–29)
CO2 BLDA-SCNC: 33 MMOL/L (ref 24–29)
CO2 BLDA-SCNC: 35 MMOL/L (ref 24–29)
CO2 SERPL-SCNC: 24 MMOL/L (ref 20–31)
CO2 SERPL-SCNC: 24 MMOL/L (ref 20–31)
CO2 SERPL-SCNC: 25 MMOL/L (ref 20–31)
COCAINE UR QL: NEGATIVE
COHGB MFR BLD: 0.9 % (ref 0–2)
COHGB MFR BLD: 1.7 % (ref 0–2)
CREAT BLD-MCNC: 0.94 MG/DL (ref 0.6–1.3)
CREAT BLD-MCNC: 1.1 MG/DL (ref 0.6–1.3)
CREAT BLD-MCNC: 1.11 MG/DL (ref 0.6–1.3)
DEPRECATED RDW RBC AUTO: 63.5 FL (ref 37–54)
DEPRECATED RDW RBC AUTO: 64.9 FL (ref 37–54)
DEPRECATED RDW RBC AUTO: 67.8 FL (ref 37–54)
DEPRECATED RDW RBC AUTO: 68.3 FL (ref 37–54)
ERYTHROCYTE [DISTWIDTH] IN BLOOD BY AUTOMATED COUNT: 17.2 % (ref 11.3–14.5)
ERYTHROCYTE [DISTWIDTH] IN BLOOD BY AUTOMATED COUNT: 17.3 % (ref 11.3–14.5)
ERYTHROCYTE [DISTWIDTH] IN BLOOD BY AUTOMATED COUNT: 18.8 % (ref 11.3–14.5)
ERYTHROCYTE [DISTWIDTH] IN BLOOD BY AUTOMATED COUNT: 19.5 % (ref 11.3–14.5)
FERRITIN SERPL-MCNC: 85 NG/ML (ref 22–322)
FIBRINOGEN PPP-MCNC: 174 MG/DL (ref 198–466)
FSP PPP LA-ACNC: NORMAL
GFR SERPL CREATININE-BSD FRML MDRD: 82 ML/MIN/1.73
GFR SERPL CREATININE-BSD FRML MDRD: 82 ML/MIN/1.73
GFR SERPL CREATININE-BSD FRML MDRD: 99 ML/MIN/1.73
GLUCOSE BLD-MCNC: 108 MG/DL (ref 70–100)
GLUCOSE BLD-MCNC: 208 MG/DL (ref 70–100)
GLUCOSE BLD-MCNC: 82 MG/DL (ref 70–100)
GLUCOSE BLDC GLUCOMTR-MCNC: 101 MG/DL (ref 70–130)
GLUCOSE BLDC GLUCOMTR-MCNC: 103 MG/DL (ref 70–130)
GLUCOSE BLDC GLUCOMTR-MCNC: 111 MG/DL (ref 70–130)
GLUCOSE BLDC GLUCOMTR-MCNC: 119 MG/DL (ref 70–130)
GLUCOSE BLDC GLUCOMTR-MCNC: 126 MG/DL (ref 70–130)
GLUCOSE BLDC GLUCOMTR-MCNC: 160 MG/DL (ref 70–130)
GLUCOSE BLDC GLUCOMTR-MCNC: 175 MG/DL (ref 70–130)
GLUCOSE BLDC GLUCOMTR-MCNC: 192 MG/DL (ref 70–130)
GLUCOSE BLDC GLUCOMTR-MCNC: 233 MG/DL (ref 70–130)
GLUCOSE BLDC GLUCOMTR-MCNC: 94 MG/DL (ref 70–130)
GLUCOSE BLDC GLUCOMTR-MCNC: 99 MG/DL (ref 70–130)
HCO3 BLDA-SCNC: 23.9 MMOL/L (ref 20–26)
HCO3 BLDA-SCNC: 24.2 MMOL/L (ref 20–26)
HCO3 BLDA-SCNC: 24.8 MMOL/L (ref 22–26)
HCO3 BLDA-SCNC: 26.2 MMOL/L (ref 22–26)
HCO3 BLDA-SCNC: 26.2 MMOL/L (ref 22–26)
HCO3 BLDA-SCNC: 26.4 MMOL/L (ref 22–26)
HCO3 BLDA-SCNC: 27.9 MMOL/L (ref 22–26)
HCO3 BLDA-SCNC: 30.9 MMOL/L (ref 22–26)
HCO3 BLDA-SCNC: 31.9 MMOL/L (ref 22–26)
HCO3 BLDA-SCNC: 33.6 MMOL/L (ref 22–26)
HCT VFR BLD AUTO: 15.3 % (ref 38.9–50.9)
HCT VFR BLD AUTO: 21.2 % (ref 38.9–50.9)
HCT VFR BLD AUTO: 22.2 % (ref 38.9–50.9)
HCT VFR BLD AUTO: 23 % (ref 38.9–50.9)
HCT VFR BLD CALC: 22.6 %
HCT VFR BLD CALC: 23.3 %
HCT VFR BLDA CALC: 20 % (ref 38–51)
HCT VFR BLDA CALC: 21 % (ref 38–51)
HCT VFR BLDA CALC: 24 % (ref 38–51)
HCT VFR BLDA CALC: 30 % (ref 38–51)
HCT VFR BLDA CALC: 36 % (ref 38–51)
HGB BLD-MCNC: 5.3 G/DL (ref 13.1–17.5)
HGB BLD-MCNC: 7.2 G/DL (ref 13.1–17.5)
HGB BLD-MCNC: 7.5 G/DL (ref 13.1–17.5)
HGB BLD-MCNC: 8.1 G/DL (ref 13.1–17.5)
HGB BLDA-MCNC: 10.2 G/DL (ref 12–17)
HGB BLDA-MCNC: 12.2 G/DL (ref 12–17)
HGB BLDA-MCNC: 6.8 G/DL (ref 12–17)
HGB BLDA-MCNC: 7.1 G/DL (ref 12–17)
HGB BLDA-MCNC: 7.4 G/DL (ref 13.5–17.5)
HGB BLDA-MCNC: 7.6 G/DL (ref 13.5–17.5)
HGB BLDA-MCNC: 8.2 G/DL (ref 12–17)
HOROWITZ INDEX BLD+IHG-RTO: 100 %
HOROWITZ INDEX BLD+IHG-RTO: 35 %
INR PPP: 1.11 (ref 0.91–1.09)
INR PPP: 1.63 (ref 0.91–1.09)
IRON 24H UR-MRATE: 92 MCG/DL (ref 50–175)
IRON SATN MFR SERPL: 63 % (ref 20–50)
MAGNESIUM SERPL-MCNC: 2.4 MG/DL (ref 1.3–2.7)
MAGNESIUM SERPL-MCNC: 2.7 MG/DL (ref 1.3–2.7)
MAGNESIUM SERPL-MCNC: 3.1 MG/DL (ref 1.3–2.7)
MCH RBC QN AUTO: 33.5 PG (ref 27–31)
MCH RBC QN AUTO: 33.8 PG (ref 27–31)
MCH RBC QN AUTO: 36.5 PG (ref 27–31)
MCH RBC QN AUTO: 36.6 PG (ref 27–31)
MCHC RBC AUTO-ENTMCNC: 33.8 G/DL (ref 32–36)
MCHC RBC AUTO-ENTMCNC: 34 G/DL (ref 32–36)
MCHC RBC AUTO-ENTMCNC: 34.6 G/DL (ref 32–36)
MCHC RBC AUTO-ENTMCNC: 35.2 G/DL (ref 32–36)
MCV RBC AUTO: 100 FL (ref 80–99)
MCV RBC AUTO: 103.6 FL (ref 80–99)
MCV RBC AUTO: 105.5 FL (ref 80–99)
MCV RBC AUTO: 98.6 FL (ref 80–99)
METHADONE UR QL SCN: NEGATIVE
METHGB BLD QL: 1.4 % (ref 0–1.5)
METHGB BLD QL: 1.6 % (ref 0–1.5)
MODALITY: ABNORMAL
MODALITY: ABNORMAL
OPIATES UR QL: NEGATIVE
OXYCODONE UR QL SCN: NEGATIVE
OXYHGB MFR BLDV: 95.8 % (ref 94–99)
OXYHGB MFR BLDV: 97.7 % (ref 94–99)
PCO2 BLDA: 32 MM HG (ref 35–48)
PCO2 BLDA: 34.8 MM HG (ref 35–45)
PCO2 BLDA: 34.8 MM HG (ref 35–45)
PCO2 BLDA: 36.4 MM HG (ref 35–45)
PCO2 BLDA: 37.3 MM HG (ref 35–45)
PCO2 BLDA: 38.1 MM HG (ref 35–45)
PCO2 BLDA: 38.3 MM HG (ref 35–45)
PCO2 BLDA: 40.2 MM HG (ref 35–45)
PCO2 BLDA: 42 MM HG (ref 35–45)
PCO2 BLDA: 44.5 MM HG (ref 35–48)
PCP UR QL SCN: NEGATIVE
PH BLDA: 7.34 PH UNITS (ref 7.35–7.45)
PH BLDA: 7.4 PH UNITS (ref 7.35–7.6)
PH BLDA: 7.46 PH UNITS (ref 7.35–7.6)
PH BLDA: 7.46 PH UNITS (ref 7.35–7.6)
PH BLDA: 7.47 PH UNITS (ref 7.35–7.6)
PH BLDA: 7.48 PH UNITS (ref 7.35–7.45)
PH BLDA: 7.49 PH UNITS (ref 7.35–7.6)
PH BLDA: 7.51 PH UNITS (ref 7.35–7.6)
PH BLDA: 7.52 PH UNITS (ref 7.35–7.6)
PH BLDA: 7.57 PH UNITS (ref 7.35–7.6)
PHOSPHATE SERPL-MCNC: 1.1 MG/DL (ref 2.4–5.1)
PHOSPHATE SERPL-MCNC: 1.2 MG/DL (ref 2.4–5.1)
PHOSPHATE SERPL-MCNC: 2 MG/DL (ref 2.4–5.1)
PLATELET # BLD AUTO: 118 10*3/MM3 (ref 150–450)
PLATELET # BLD AUTO: 174 10*3/MM3 (ref 150–450)
PLATELET # BLD AUTO: 182 10*3/MM3 (ref 150–450)
PLATELET # BLD AUTO: 210 10*3/MM3 (ref 150–450)
PMV BLD AUTO: 10.1 FL (ref 6–12)
PMV BLD AUTO: 10.2 FL (ref 6–12)
PMV BLD AUTO: 10.3 FL (ref 6–12)
PMV BLD AUTO: 10.7 FL (ref 6–12)
PO2 BLDA: 115 MM HG (ref 83–108)
PO2 BLDA: 287 MMHG (ref 80–105)
PO2 BLDA: 309 MMHG (ref 80–105)
PO2 BLDA: 355 MMHG (ref 80–105)
PO2 BLDA: 357 MMHG (ref 80–105)
PO2 BLDA: 38 MMHG (ref 80–105)
PO2 BLDA: 467 MMHG (ref 80–105)
PO2 BLDA: 480 MM HG (ref 83–108)
PO2 BLDA: 481 MMHG (ref 80–105)
PO2 BLDA: 75 MMHG (ref 80–105)
POTASSIUM BLD-SCNC: 3.4 MMOL/L (ref 3.5–5.5)
POTASSIUM BLD-SCNC: 3.9 MMOL/L (ref 3.5–5.5)
POTASSIUM BLD-SCNC: 3.9 MMOL/L (ref 3.5–5.5)
POTASSIUM BLDA-SCNC: 2.9 MMOL/L (ref 3.5–4.9)
POTASSIUM BLDA-SCNC: 3 MMOL/L (ref 3.5–4.9)
POTASSIUM BLDA-SCNC: 3.7 MMOL/L (ref 3.5–4.9)
POTASSIUM BLDA-SCNC: 3.8 MMOL/L (ref 3.5–4.9)
POTASSIUM BLDA-SCNC: 3.8 MMOL/L (ref 3.5–4.9)
POTASSIUM BLDA-SCNC: 4.3 MMOL/L (ref 3.5–4.9)
PROPOXYPH UR QL: NEGATIVE
PROTHROMBIN TIME: 11.7 SECONDS (ref 9.6–11.5)
PROTHROMBIN TIME: 17.1 SECONDS (ref 9.6–11.5)
RBC # BLD AUTO: 1.45 10*6/MM3 (ref 4.2–5.76)
RBC # BLD AUTO: 2.15 10*6/MM3 (ref 4.2–5.76)
RBC # BLD AUTO: 2.22 10*6/MM3 (ref 4.2–5.76)
RBC # BLD AUTO: 2.22 10*6/MM3 (ref 4.2–5.76)
SAO2 % BLDA: 100 % (ref 95–98)
SAO2 % BLDA: 78 % (ref 95–98)
SAO2 % BLDA: 96 % (ref 95–98)
SODIUM BLD-SCNC: 141 MMOL/L (ref 132–146)
SODIUM BLD-SCNC: 143 MMOL/L (ref 132–146)
SODIUM BLD-SCNC: 144 MMOL/L (ref 132–146)
SODIUM BLDA-SCNC: 136 MMOL/L (ref 138–146)
SODIUM BLDA-SCNC: 138 MMOL/L (ref 138–146)
SODIUM BLDA-SCNC: 139 MMOL/L (ref 138–146)
SODIUM BLDA-SCNC: 139 MMOL/L (ref 138–146)
SODIUM BLDA-SCNC: 141 MMOL/L (ref 138–146)
SODIUM BLDA-SCNC: 142 MMOL/L (ref 138–146)
TIBC SERPL-MCNC: 147 MCG/DL (ref 250–450)
TRICYCLICS UR QL SCN: NEGATIVE
VIT B12 BLD-MCNC: 74 PG/ML (ref 211–911)
WBC NRBC COR # BLD: 6.32 10*3/MM3 (ref 3.5–10.8)
WBC NRBC COR # BLD: 6.97 10*3/MM3 (ref 3.5–10.8)
WBC NRBC COR # BLD: 7.14 10*3/MM3 (ref 3.5–10.8)
WBC NRBC COR # BLD: 8.1 10*3/MM3 (ref 3.5–10.8)

## 2018-10-02 PROCEDURE — 85014 HEMATOCRIT: CPT

## 2018-10-02 PROCEDURE — 33533 CABG ARTERIAL SINGLE: CPT | Performed by: PHYSICIAN ASSISTANT

## 2018-10-02 PROCEDURE — 84132 ASSAY OF SERUM POTASSIUM: CPT

## 2018-10-02 PROCEDURE — 83550 IRON BINDING TEST: CPT | Performed by: INTERNAL MEDICINE

## 2018-10-02 PROCEDURE — C1729 CATH, DRAINAGE: HCPCS | Performed by: THORACIC SURGERY (CARDIOTHORACIC VASCULAR SURGERY)

## 2018-10-02 PROCEDURE — 93005 ELECTROCARDIOGRAM TRACING: CPT | Performed by: PHYSICIAN ASSISTANT

## 2018-10-02 PROCEDURE — 85014 HEMATOCRIT: CPT | Performed by: INTERNAL MEDICINE

## 2018-10-02 PROCEDURE — C1751 CATH, INF, PER/CENT/MIDLINE: HCPCS | Performed by: ANESTHESIOLOGY

## 2018-10-02 PROCEDURE — 02100Z9 BYPASS CORONARY ARTERY, ONE ARTERY FROM LEFT INTERNAL MAMMARY, OPEN APPROACH: ICD-10-PCS | Performed by: THORACIC SURGERY (CARDIOTHORACIC VASCULAR SURGERY)

## 2018-10-02 PROCEDURE — 94003 VENT MGMT INPAT SUBQ DAY: CPT

## 2018-10-02 PROCEDURE — 94002 VENT MGMT INPAT INIT DAY: CPT

## 2018-10-02 PROCEDURE — 021309W BYPASS CORONARY ARTERY, FOUR OR MORE ARTERIES FROM AORTA WITH AUTOLOGOUS VENOUS TISSUE, OPEN APPROACH: ICD-10-PCS | Performed by: THORACIC SURGERY (CARDIOTHORACIC VASCULAR SURGERY)

## 2018-10-02 PROCEDURE — 25010000002 PROTAMINE SULFATE PER 10 MG: Performed by: ANESTHESIOLOGY

## 2018-10-02 PROCEDURE — 86927 PLASMA FRESH FROZEN: CPT

## 2018-10-02 PROCEDURE — P9017 PLASMA 1 DONOR FRZ W/IN 8 HR: HCPCS

## 2018-10-02 PROCEDURE — 25010000002 PROPOFOL 1000 MG/ML EMULSION: Performed by: THORACIC SURGERY (CARDIOTHORACIC VASCULAR SURGERY)

## 2018-10-02 PROCEDURE — P9016 RBC LEUKOCYTES REDUCED: HCPCS

## 2018-10-02 PROCEDURE — 94799 UNLISTED PULMONARY SVC/PX: CPT

## 2018-10-02 PROCEDURE — 25010000002 HEPARIN (PORCINE) PER 1000 UNITS: Performed by: THORACIC SURGERY (CARDIOTHORACIC VASCULAR SURGERY)

## 2018-10-02 PROCEDURE — 25010000002 CEFUROXIME PER 750 MG: Performed by: ANESTHESIOLOGY

## 2018-10-02 PROCEDURE — 85730 THROMBOPLASTIN TIME PARTIAL: CPT | Performed by: THORACIC SURGERY (CARDIOTHORACIC VASCULAR SURGERY)

## 2018-10-02 PROCEDURE — 82330 ASSAY OF CALCIUM: CPT | Performed by: PHYSICIAN ASSISTANT

## 2018-10-02 PROCEDURE — 80069 RENAL FUNCTION PANEL: CPT | Performed by: PHYSICIAN ASSISTANT

## 2018-10-02 PROCEDURE — 25010000002 PROPOFOL 10 MG/ML EMULSION: Performed by: ANESTHESIOLOGY

## 2018-10-02 PROCEDURE — 06BP4ZZ EXCISION OF RIGHT SAPHENOUS VEIN, PERCUTANEOUS ENDOSCOPIC APPROACH: ICD-10-PCS | Performed by: THORACIC SURGERY (CARDIOTHORACIC VASCULAR SURGERY)

## 2018-10-02 PROCEDURE — 85384 FIBRINOGEN ACTIVITY: CPT | Performed by: THORACIC SURGERY (CARDIOTHORACIC VASCULAR SURGERY)

## 2018-10-02 PROCEDURE — 83540 ASSAY OF IRON: CPT | Performed by: INTERNAL MEDICINE

## 2018-10-02 PROCEDURE — A4648 IMPLANTABLE TISSUE MARKER: HCPCS | Performed by: THORACIC SURGERY (CARDIOTHORACIC VASCULAR SURGERY)

## 2018-10-02 PROCEDURE — 85027 COMPLETE CBC AUTOMATED: CPT | Performed by: THORACIC SURGERY (CARDIOTHORACIC VASCULAR SURGERY)

## 2018-10-02 PROCEDURE — 33533 CABG ARTERIAL SINGLE: CPT | Performed by: THORACIC SURGERY (CARDIOTHORACIC VASCULAR SURGERY)

## 2018-10-02 PROCEDURE — 25810000003 DEXTROSE 5 % WITH KCL 20 MEQ 20-5 MEQ/L-% SOLUTION: Performed by: PHYSICIAN ASSISTANT

## 2018-10-02 PROCEDURE — 85730 THROMBOPLASTIN TIME PARTIAL: CPT | Performed by: PHYSICIAN ASSISTANT

## 2018-10-02 PROCEDURE — P9041 ALBUMIN (HUMAN),5%, 50ML: HCPCS | Performed by: PHYSICIAN ASSISTANT

## 2018-10-02 PROCEDURE — 33521 CABG ARTERY-VEIN FOUR: CPT | Performed by: THORACIC SURGERY (CARDIOTHORACIC VASCULAR SURGERY)

## 2018-10-02 PROCEDURE — 82728 ASSAY OF FERRITIN: CPT | Performed by: INTERNAL MEDICINE

## 2018-10-02 PROCEDURE — 93010 ELECTROCARDIOGRAM REPORT: CPT | Performed by: INTERNAL MEDICINE

## 2018-10-02 PROCEDURE — 83735 ASSAY OF MAGNESIUM: CPT | Performed by: THORACIC SURGERY (CARDIOTHORACIC VASCULAR SURGERY)

## 2018-10-02 PROCEDURE — 82330 ASSAY OF CALCIUM: CPT | Performed by: THORACIC SURGERY (CARDIOTHORACIC VASCULAR SURGERY)

## 2018-10-02 PROCEDURE — 80069 RENAL FUNCTION PANEL: CPT | Performed by: THORACIC SURGERY (CARDIOTHORACIC VASCULAR SURGERY)

## 2018-10-02 PROCEDURE — 85610 PROTHROMBIN TIME: CPT | Performed by: THORACIC SURGERY (CARDIOTHORACIC VASCULAR SURGERY)

## 2018-10-02 PROCEDURE — 85027 COMPLETE CBC AUTOMATED: CPT | Performed by: PHYSICIAN ASSISTANT

## 2018-10-02 PROCEDURE — 99291 CRITICAL CARE FIRST HOUR: CPT | Performed by: INTERNAL MEDICINE

## 2018-10-02 PROCEDURE — 71045 X-RAY EXAM CHEST 1 VIEW: CPT

## 2018-10-02 PROCEDURE — 25010000002 ALBUMIN HUMAN 5% PER 50 ML: Performed by: ANESTHESIOLOGY

## 2018-10-02 PROCEDURE — P9035 PLATELET PHERES LEUKOREDUCED: HCPCS

## 2018-10-02 PROCEDURE — 25010000003 POTASSIUM CHLORIDE PER 2 MEQ: Performed by: PHYSICIAN ASSISTANT

## 2018-10-02 PROCEDURE — 82947 ASSAY GLUCOSE BLOOD QUANT: CPT

## 2018-10-02 PROCEDURE — 63710000001 INSULIN REGULAR HUMAN PER 5 UNITS: Performed by: ANESTHESIOLOGY

## 2018-10-02 PROCEDURE — 25010000002 PROTAMINE SULFATE PER 10 MG: Performed by: PHYSICIAN ASSISTANT

## 2018-10-02 PROCEDURE — 36430 TRANSFUSION BLD/BLD COMPNT: CPT

## 2018-10-02 PROCEDURE — 85362 FIBRIN DEGRADATION PRODUCTS: CPT | Performed by: THORACIC SURGERY (CARDIOTHORACIC VASCULAR SURGERY)

## 2018-10-02 PROCEDURE — 25010000002 ALBUMIN HUMAN 5% PER 50 ML: Performed by: PHYSICIAN ASSISTANT

## 2018-10-02 PROCEDURE — 85347 COAGULATION TIME ACTIVATED: CPT

## 2018-10-02 PROCEDURE — P9041 ALBUMIN (HUMAN),5%, 50ML: HCPCS | Performed by: ANESTHESIOLOGY

## 2018-10-02 PROCEDURE — 99232 SBSQ HOSP IP/OBS MODERATE 35: CPT | Performed by: NURSE PRACTITIONER

## 2018-10-02 PROCEDURE — 82805 BLOOD GASES W/O2 SATURATION: CPT | Performed by: PHYSICIAN ASSISTANT

## 2018-10-02 PROCEDURE — 82330 ASSAY OF CALCIUM: CPT

## 2018-10-02 PROCEDURE — 84295 ASSAY OF SERUM SODIUM: CPT

## 2018-10-02 PROCEDURE — 25010000002 CEFUROXIME: Performed by: PHYSICIAN ASSISTANT

## 2018-10-02 PROCEDURE — 25010000002 FENTANYL CITRATE (PF) 100 MCG/2ML SOLUTION: Performed by: THORACIC SURGERY (CARDIOTHORACIC VASCULAR SURGERY)

## 2018-10-02 PROCEDURE — 85610 PROTHROMBIN TIME: CPT | Performed by: PHYSICIAN ASSISTANT

## 2018-10-02 PROCEDURE — 82607 VITAMIN B-12: CPT | Performed by: INTERNAL MEDICINE

## 2018-10-02 PROCEDURE — 5A1221Z PERFORMANCE OF CARDIAC OUTPUT, CONTINUOUS: ICD-10-PCS | Performed by: THORACIC SURGERY (CARDIOTHORACIC VASCULAR SURGERY)

## 2018-10-02 PROCEDURE — 33521 CABG ARTERY-VEIN FOUR: CPT | Performed by: PHYSICIAN ASSISTANT

## 2018-10-02 PROCEDURE — 33508 ENDOSCOPIC VEIN HARVEST: CPT | Performed by: THORACIC SURGERY (CARDIOTHORACIC VASCULAR SURGERY)

## 2018-10-02 PROCEDURE — 25010000002 MIDAZOLAM PER 1 MG: Performed by: ANESTHESIOLOGY

## 2018-10-02 PROCEDURE — 25010000002 HEPARIN (PORCINE) PER 1000 UNITS: Performed by: ANESTHESIOLOGY

## 2018-10-02 PROCEDURE — 83735 ASSAY OF MAGNESIUM: CPT | Performed by: PHYSICIAN ASSISTANT

## 2018-10-02 PROCEDURE — 82747 ASSAY OF FOLIC ACID RBC: CPT | Performed by: INTERNAL MEDICINE

## 2018-10-02 PROCEDURE — 25010000002 PAPAVERINE PER 60 MG: Performed by: THORACIC SURGERY (CARDIOTHORACIC VASCULAR SURGERY)

## 2018-10-02 PROCEDURE — 82805 BLOOD GASES W/O2 SATURATION: CPT | Performed by: INTERNAL MEDICINE

## 2018-10-02 PROCEDURE — 86900 BLOOD TYPING SEROLOGIC ABO: CPT

## 2018-10-02 PROCEDURE — 82803 BLOOD GASES ANY COMBINATION: CPT

## 2018-10-02 DEVICE — DISK-SHAPED STYLE, SILICONE (1 PER STERILE PKG)
Type: IMPLANTABLE DEVICE | Site: HEART | Status: FUNCTIONAL
Brand: SCANLAN® RADIOMARK® GRAFT MARKERS

## 2018-10-02 RX ORDER — MORPHINE SULFATE 2 MG/ML
2 INJECTION, SOLUTION INTRAMUSCULAR; INTRAVENOUS
Status: DISCONTINUED | OUTPATIENT
Start: 2018-10-02 | End: 2018-10-09 | Stop reason: HOSPADM

## 2018-10-02 RX ORDER — PROTAMINE SULFATE 10 MG/ML
INJECTION, SOLUTION INTRAVENOUS AS NEEDED
Status: DISCONTINUED | OUTPATIENT
Start: 2018-10-02 | End: 2018-10-02 | Stop reason: SURG

## 2018-10-02 RX ORDER — ALBUMIN, HUMAN INJ 5% 5 %
SOLUTION INTRAVENOUS CONTINUOUS PRN
Status: DISCONTINUED | OUTPATIENT
Start: 2018-10-02 | End: 2018-10-02 | Stop reason: SURG

## 2018-10-02 RX ORDER — BISACODYL 10 MG
10 SUPPOSITORY, RECTAL RECTAL DAILY PRN
Status: DISCONTINUED | OUTPATIENT
Start: 2018-10-03 | End: 2018-10-05

## 2018-10-02 RX ORDER — PHENYLEPHRINE HCL IN 0.9% NACL 0.5 MG/5ML
.5-3 SYRINGE (ML) INTRAVENOUS CONTINUOUS PRN
Status: DISCONTINUED | OUTPATIENT
Start: 2018-10-02 | End: 2018-10-05

## 2018-10-02 RX ORDER — POTASSIUM CHLORIDE 29.8 MG/ML
20 INJECTION INTRAVENOUS
Status: DISCONTINUED | OUTPATIENT
Start: 2018-10-02 | End: 2018-10-03

## 2018-10-02 RX ORDER — MAGNESIUM HYDROXIDE 1200 MG/15ML
LIQUID ORAL AS NEEDED
Status: DISCONTINUED | OUTPATIENT
Start: 2018-10-02 | End: 2018-10-02 | Stop reason: HOSPADM

## 2018-10-02 RX ORDER — METOPROLOL TARTRATE 5 MG/5ML
2.5 INJECTION INTRAVENOUS EVERY 6 HOURS SCHEDULED
Status: DISCONTINUED | OUTPATIENT
Start: 2018-10-02 | End: 2018-10-03

## 2018-10-02 RX ORDER — DOBUTAMINE HYDROCHLORIDE 100 MG/100ML
2-20 INJECTION INTRAVENOUS CONTINUOUS PRN
Status: DISCONTINUED | OUTPATIENT
Start: 2018-10-02 | End: 2018-10-03

## 2018-10-02 RX ORDER — PROPOFOL 10 MG/ML
VIAL (ML) INTRAVENOUS
Status: DISPENSED
Start: 2018-10-02 | End: 2018-10-03

## 2018-10-02 RX ORDER — SODIUM CHLORIDE 0.9 % (FLUSH) 0.9 %
30 SYRINGE (ML) INJECTION ONCE AS NEEDED
Status: DISCONTINUED | OUTPATIENT
Start: 2018-10-02 | End: 2018-10-02

## 2018-10-02 RX ORDER — LIDOCAINE HYDROCHLORIDE 10 MG/ML
INJECTION, SOLUTION INFILTRATION; PERINEURAL AS NEEDED
Status: DISCONTINUED | OUTPATIENT
Start: 2018-10-02 | End: 2018-10-02 | Stop reason: SURG

## 2018-10-02 RX ORDER — SENNA AND DOCUSATE SODIUM 50; 8.6 MG/1; MG/1
2 TABLET, FILM COATED ORAL 2 TIMES DAILY
Status: DISCONTINUED | OUTPATIENT
Start: 2018-10-02 | End: 2018-10-09 | Stop reason: HOSPADM

## 2018-10-02 RX ORDER — LIDOCAINE HYDROCHLORIDE 10 MG/ML
0.5 INJECTION, SOLUTION EPIDURAL; INFILTRATION; INTRACAUDAL; PERINEURAL ONCE AS NEEDED
Status: DISCONTINUED | OUTPATIENT
Start: 2018-10-02 | End: 2018-10-02 | Stop reason: HOSPADM

## 2018-10-02 RX ORDER — FENTANYL CITRATE 50 UG/ML
25 INJECTION, SOLUTION INTRAMUSCULAR; INTRAVENOUS
Status: DISCONTINUED | OUTPATIENT
Start: 2018-10-02 | End: 2018-10-03

## 2018-10-02 RX ORDER — MEPERIDINE HYDROCHLORIDE 25 MG/ML
25 INJECTION INTRAMUSCULAR; INTRAVENOUS; SUBCUTANEOUS EVERY 4 HOURS PRN
Status: DISCONTINUED | OUTPATIENT
Start: 2018-10-02 | End: 2018-10-03

## 2018-10-02 RX ORDER — POTASSIUM CHLORIDE, DEXTROSE MONOHYDRATE 150; 5 MG/100ML; G/100ML
30 INJECTION, SOLUTION INTRAVENOUS CONTINUOUS
Status: DISCONTINUED | OUTPATIENT
Start: 2018-10-02 | End: 2018-10-03

## 2018-10-02 RX ORDER — HYDROCODONE BITARTRATE AND ACETAMINOPHEN 7.5; 325 MG/1; MG/1
1 TABLET ORAL EVERY 4 HOURS PRN
Status: DISCONTINUED | OUTPATIENT
Start: 2018-10-02 | End: 2018-10-09 | Stop reason: HOSPADM

## 2018-10-02 RX ORDER — ROCURONIUM BROMIDE 10 MG/ML
INJECTION, SOLUTION INTRAVENOUS AS NEEDED
Status: DISCONTINUED | OUTPATIENT
Start: 2018-10-02 | End: 2018-10-02 | Stop reason: SURG

## 2018-10-02 RX ORDER — PAPAVERINE HYDROCHLORIDE 30 MG/ML
INJECTION INTRAMUSCULAR; INTRAVENOUS AS NEEDED
Status: DISCONTINUED | OUTPATIENT
Start: 2018-10-02 | End: 2018-10-02 | Stop reason: HOSPADM

## 2018-10-02 RX ORDER — CHLORHEXIDINE GLUCONATE 0.12 MG/ML
15 RINSE ORAL EVERY 12 HOURS SCHEDULED
Status: DISCONTINUED | OUTPATIENT
Start: 2018-10-02 | End: 2018-10-03

## 2018-10-02 RX ORDER — PROPOFOL 10 MG/ML
VIAL (ML) INTRAVENOUS AS NEEDED
Status: DISCONTINUED | OUTPATIENT
Start: 2018-10-02 | End: 2018-10-02 | Stop reason: SURG

## 2018-10-02 RX ORDER — SODIUM CHLORIDE 0.9 % (FLUSH) 0.9 %
1-10 SYRINGE (ML) INJECTION AS NEEDED
Status: DISCONTINUED | OUTPATIENT
Start: 2018-10-02 | End: 2018-10-02 | Stop reason: HOSPADM

## 2018-10-02 RX ORDER — ASPIRIN 325 MG
325 TABLET ORAL ONCE
Status: COMPLETED | OUTPATIENT
Start: 2018-10-02 | End: 2018-10-02

## 2018-10-02 RX ORDER — ATORVASTATIN CALCIUM 40 MG/1
40 TABLET, FILM COATED ORAL NIGHTLY
Status: DISCONTINUED | OUTPATIENT
Start: 2018-10-02 | End: 2018-10-05

## 2018-10-02 RX ORDER — SUFENTANIL CITRATE 50 UG/ML
INJECTION EPIDURAL; INTRAVENOUS AS NEEDED
Status: DISCONTINUED | OUTPATIENT
Start: 2018-10-02 | End: 2018-10-02 | Stop reason: SURG

## 2018-10-02 RX ORDER — MIDAZOLAM HYDROCHLORIDE 1 MG/ML
INJECTION INTRAMUSCULAR; INTRAVENOUS AS NEEDED
Status: DISCONTINUED | OUTPATIENT
Start: 2018-10-02 | End: 2018-10-02 | Stop reason: SURG

## 2018-10-02 RX ORDER — NALOXONE HCL 0.4 MG/ML
0.4 VIAL (ML) INJECTION
Status: DISCONTINUED | OUTPATIENT
Start: 2018-10-02 | End: 2018-10-03

## 2018-10-02 RX ORDER — ALBUMIN, HUMAN INJ 5% 5 %
500 SOLUTION INTRAVENOUS AS NEEDED
Status: COMPLETED | OUTPATIENT
Start: 2018-10-02 | End: 2018-10-02

## 2018-10-02 RX ORDER — SODIUM CHLORIDE 9 MG/ML
30 INJECTION, SOLUTION INTRAVENOUS CONTINUOUS PRN
Status: DISCONTINUED | OUTPATIENT
Start: 2018-10-02 | End: 2018-10-02

## 2018-10-02 RX ORDER — NOREPINEPHRINE BITARTRATE 1 MG/ML
INJECTION, SOLUTION INTRAVENOUS CONTINUOUS PRN
Status: DISCONTINUED | OUTPATIENT
Start: 2018-10-02 | End: 2018-10-02 | Stop reason: SURG

## 2018-10-02 RX ORDER — POTASSIUM CHLORIDE 750 MG/1
40 CAPSULE, EXTENDED RELEASE ORAL AS NEEDED
Status: DISCONTINUED | OUTPATIENT
Start: 2018-10-02 | End: 2018-10-05

## 2018-10-02 RX ORDER — SODIUM CHLORIDE, SODIUM LACTATE, POTASSIUM CHLORIDE, CALCIUM CHLORIDE 600; 310; 30; 20 MG/100ML; MG/100ML; MG/100ML; MG/100ML
9 INJECTION, SOLUTION INTRAVENOUS CONTINUOUS PRN
Status: DISCONTINUED | OUTPATIENT
Start: 2018-10-02 | End: 2018-10-02 | Stop reason: HOSPADM

## 2018-10-02 RX ORDER — SODIUM CHLORIDE 9 MG/ML
INJECTION, SOLUTION INTRAVENOUS AS NEEDED
Status: DISCONTINUED | OUTPATIENT
Start: 2018-10-02 | End: 2018-10-02 | Stop reason: HOSPADM

## 2018-10-02 RX ORDER — NOREPINEPHRINE BIT/0.9 % NACL 8 MG/250ML
.02-.3 INFUSION BOTTLE (ML) INTRAVENOUS CONTINUOUS PRN
Status: DISCONTINUED | OUTPATIENT
Start: 2018-10-02 | End: 2018-10-05

## 2018-10-02 RX ORDER — SODIUM CHLORIDE, SODIUM LACTATE, POTASSIUM CHLORIDE, CALCIUM CHLORIDE 600; 310; 30; 20 MG/100ML; MG/100ML; MG/100ML; MG/100ML
9 INJECTION, SOLUTION INTRAVENOUS CONTINUOUS
Status: DISCONTINUED | OUTPATIENT
Start: 2018-10-02 | End: 2018-10-02

## 2018-10-02 RX ORDER — PROTAMINE SULFATE 10 MG/ML
50 INJECTION, SOLUTION INTRAVENOUS ONCE
Status: COMPLETED | OUTPATIENT
Start: 2018-10-02 | End: 2018-10-02

## 2018-10-02 RX ORDER — FAMOTIDINE 20 MG/1
20 TABLET, FILM COATED ORAL
Status: DISCONTINUED | OUTPATIENT
Start: 2018-10-02 | End: 2018-10-02 | Stop reason: HOSPADM

## 2018-10-02 RX ORDER — POTASSIUM CHLORIDE 1.5 G/1.77G
40 POWDER, FOR SOLUTION ORAL AS NEEDED
Status: DISCONTINUED | OUTPATIENT
Start: 2018-10-02 | End: 2018-10-05

## 2018-10-02 RX ORDER — SODIUM CHLORIDE 9 MG/ML
INJECTION, SOLUTION INTRAVENOUS CONTINUOUS PRN
Status: DISCONTINUED | OUTPATIENT
Start: 2018-10-02 | End: 2018-10-02 | Stop reason: SURG

## 2018-10-02 RX ORDER — PROTAMINE SULFATE 10 MG/ML
INJECTION, SOLUTION INTRAVENOUS
Status: DISPENSED
Start: 2018-10-02 | End: 2018-10-02

## 2018-10-02 RX ORDER — ALBUMIN, HUMAN INJ 5% 5 %
SOLUTION INTRAVENOUS
Status: DISPENSED
Start: 2018-10-02 | End: 2018-10-02

## 2018-10-02 RX ORDER — HEPARIN SODIUM 1000 [USP'U]/ML
INJECTION, SOLUTION INTRAVENOUS; SUBCUTANEOUS AS NEEDED
Status: DISCONTINUED | OUTPATIENT
Start: 2018-10-02 | End: 2018-10-02 | Stop reason: SURG

## 2018-10-02 RX ORDER — VECURONIUM BROMIDE 1 MG/ML
INJECTION, POWDER, LYOPHILIZED, FOR SOLUTION INTRAVENOUS AS NEEDED
Status: DISCONTINUED | OUTPATIENT
Start: 2018-10-02 | End: 2018-10-02 | Stop reason: SURG

## 2018-10-02 RX ORDER — ASPIRIN 325 MG
325 TABLET, DELAYED RELEASE (ENTERIC COATED) ORAL DAILY
Status: DISCONTINUED | OUTPATIENT
Start: 2018-10-03 | End: 2018-10-09 | Stop reason: HOSPADM

## 2018-10-02 RX ORDER — ALBUMIN, HUMAN INJ 5% 5 %
SOLUTION INTRAVENOUS
Status: DISPENSED
Start: 2018-10-02 | End: 2018-10-03

## 2018-10-02 RX ORDER — DOPAMINE HYDROCHLORIDE 160 MG/100ML
2-20 INJECTION, SOLUTION INTRAVENOUS CONTINUOUS PRN
Status: DISCONTINUED | OUTPATIENT
Start: 2018-10-02 | End: 2018-10-03

## 2018-10-02 RX ORDER — BISACODYL 5 MG/1
10 TABLET, DELAYED RELEASE ORAL DAILY PRN
Status: DISCONTINUED | OUTPATIENT
Start: 2018-10-02 | End: 2018-10-09 | Stop reason: HOSPADM

## 2018-10-02 RX ORDER — OXYCODONE HYDROCHLORIDE AND ACETAMINOPHEN 5; 325 MG/1; MG/1
2 TABLET ORAL EVERY 4 HOURS PRN
Status: DISCONTINUED | OUTPATIENT
Start: 2018-10-02 | End: 2018-10-09 | Stop reason: HOSPADM

## 2018-10-02 RX ORDER — NITROGLYCERIN 20 MG/100ML
5-200 INJECTION INTRAVENOUS CONTINUOUS PRN
Status: DISCONTINUED | OUTPATIENT
Start: 2018-10-02 | End: 2018-10-05

## 2018-10-02 RX ORDER — ACETAMINOPHEN 325 MG/1
650 TABLET ORAL EVERY 4 HOURS PRN
Status: DISCONTINUED | OUTPATIENT
Start: 2018-10-02 | End: 2018-10-09 | Stop reason: HOSPADM

## 2018-10-02 RX ORDER — ONDANSETRON 2 MG/ML
4 INJECTION INTRAMUSCULAR; INTRAVENOUS EVERY 6 HOURS PRN
Status: DISCONTINUED | OUTPATIENT
Start: 2018-10-02 | End: 2018-10-09 | Stop reason: HOSPADM

## 2018-10-02 RX ORDER — ALBUTEROL SULFATE 2.5 MG/3ML
2.5 SOLUTION RESPIRATORY (INHALATION) EVERY 4 HOURS PRN
Status: ACTIVE | OUTPATIENT
Start: 2018-10-02 | End: 2018-10-03

## 2018-10-02 RX ORDER — DOCUSATE SODIUM 100 MG/1
100 CAPSULE, LIQUID FILLED ORAL 2 TIMES DAILY PRN
Status: DISCONTINUED | OUTPATIENT
Start: 2018-10-02 | End: 2018-10-09 | Stop reason: HOSPADM

## 2018-10-02 RX ORDER — AMINOCAPROIC ACID 250 MG/ML
INJECTION, SOLUTION INTRAVENOUS AS NEEDED
Status: DISCONTINUED | OUTPATIENT
Start: 2018-10-02 | End: 2018-10-02 | Stop reason: SURG

## 2018-10-02 RX ADMIN — VECURONIUM BROMIDE 10 MG: 1 INJECTION, POWDER, LYOPHILIZED, FOR SOLUTION INTRAVENOUS at 09:36

## 2018-10-02 RX ADMIN — POTASSIUM CHLORIDE 20 MEQ: 29.8 INJECTION, SOLUTION INTRAVENOUS at 15:08

## 2018-10-02 RX ADMIN — METOPROLOL TARTRATE 1 MG: 5 INJECTION, SOLUTION INTRAVENOUS at 09:21

## 2018-10-02 RX ADMIN — ASPIRIN 325 MG ORAL TABLET 325 MG: 325 PILL ORAL at 14:26

## 2018-10-02 RX ADMIN — INSULIN HUMAN 4 UNITS: 100 INJECTION, SOLUTION PARENTERAL at 12:18

## 2018-10-02 RX ADMIN — HEPARIN SODIUM 26000 UNITS: 1000 INJECTION, SOLUTION INTRAVENOUS; SUBCUTANEOUS at 10:25

## 2018-10-02 RX ADMIN — SODIUM CHLORIDE: 9 INJECTION, SOLUTION INTRAVENOUS at 09:17

## 2018-10-02 RX ADMIN — ALBUMIN HUMAN 500 ML: 0.05 INJECTION, SOLUTION INTRAVENOUS at 15:08

## 2018-10-02 RX ADMIN — HEPARIN SODIUM 7000 UNITS: 1000 INJECTION, SOLUTION INTRAVENOUS; SUBCUTANEOUS at 10:40

## 2018-10-02 RX ADMIN — CEFUROXIME 1.5 G: 90 INJECTION, POWDER, FOR SOLUTION INTRAVENOUS at 13:11

## 2018-10-02 RX ADMIN — CEFUROXIME 1.5 G: 90 INJECTION, POWDER, FOR SOLUTION INTRAVENOUS at 09:33

## 2018-10-02 RX ADMIN — AMINOCAPROIC ACID 10 G: 250 INJECTION, SOLUTION INTRAVENOUS at 09:33

## 2018-10-02 RX ADMIN — SODIUM CHLORIDE 3 UNITS/HR: 9 INJECTION, SOLUTION INTRAVENOUS at 09:55

## 2018-10-02 RX ADMIN — SUFENTANIL CITRATE 250 MCG: 50 INJECTION EPIDURAL; INTRAVENOUS at 09:22

## 2018-10-02 RX ADMIN — AMINOCAPROIC ACID 10 G: 250 INJECTION, SOLUTION INTRAVENOUS at 13:11

## 2018-10-02 RX ADMIN — ATORVASTATIN CALCIUM 40 MG: 40 TABLET, FILM COATED ORAL at 21:11

## 2018-10-02 RX ADMIN — LIDOCAINE HYDROCHLORIDE 50 MG: 10 INJECTION, SOLUTION INFILTRATION; PERINEURAL at 09:22

## 2018-10-02 RX ADMIN — POTASSIUM CHLORIDE AND DEXTROSE MONOHYDRATE 30 ML/HR: 150; 5 INJECTION, SOLUTION INTRAVENOUS at 14:27

## 2018-10-02 RX ADMIN — DEXMEDETOMIDINE HYDROCHLORIDE 0.2 MCG/KG/HR: 100 INJECTION, SOLUTION, CONCENTRATE INTRAVENOUS at 19:51

## 2018-10-02 RX ADMIN — MUPIROCIN 1 APPLICATION: 20 OINTMENT TOPICAL at 07:06

## 2018-10-02 RX ADMIN — FAMOTIDINE 20 MG: 20 TABLET ORAL at 07:02

## 2018-10-02 RX ADMIN — MIDAZOLAM HYDROCHLORIDE 10 MG: 1 INJECTION, SOLUTION INTRAMUSCULAR; INTRAVENOUS at 09:22

## 2018-10-02 RX ADMIN — ALBUMIN HUMAN 500 ML: 0.05 INJECTION, SOLUTION INTRAVENOUS at 14:27

## 2018-10-02 RX ADMIN — ALBUMIN HUMAN: 0.05 INJECTION, SOLUTION INTRAVENOUS at 13:00

## 2018-10-02 RX ADMIN — CHLORHEXIDINE GLUCONATE 15 ML: 1.2 RINSE ORAL at 07:05

## 2018-10-02 RX ADMIN — PROPOFOL 50 MCG/KG/MIN: 10 INJECTION, EMULSION INTRAVENOUS at 14:00

## 2018-10-02 RX ADMIN — Medication 10 ML: at 06:55

## 2018-10-02 RX ADMIN — FENTANYL CITRATE 25 MCG: 50 INJECTION, SOLUTION INTRAMUSCULAR; INTRAVENOUS at 19:42

## 2018-10-02 RX ADMIN — NOREPINEPHRINE BITARTRATE 0.03 MCG/KG/MIN: 1 INJECTION, SOLUTION, CONCENTRATE INTRAVENOUS at 12:43

## 2018-10-02 RX ADMIN — ROCURONIUM BROMIDE 100 MG: 10 SOLUTION INTRAVENOUS at 09:22

## 2018-10-02 RX ADMIN — DEXMEDETOMIDINE HYDROCHLORIDE 0.2 MCG/KG/HR: 100 INJECTION, SOLUTION, CONCENTRATE INTRAVENOUS at 19:50

## 2018-10-02 RX ADMIN — POTASSIUM CHLORIDE 20 MEQ: 29.8 INJECTION, SOLUTION INTRAVENOUS at 16:35

## 2018-10-02 RX ADMIN — CHLORHEXIDINE GLUCONATE 15 ML: 1.2 RINSE ORAL at 21:11

## 2018-10-02 RX ADMIN — PROTAMINE SULFATE 50 MG: 10 INJECTION, SOLUTION INTRAVENOUS at 14:26

## 2018-10-02 RX ADMIN — PROTAMINE SULFATE 450 MG: 10 INJECTION, SOLUTION INTRAVENOUS at 12:57

## 2018-10-02 RX ADMIN — CEFUROXIME 1.5 G: 1.5 INJECTION, POWDER, FOR SOLUTION INTRAVENOUS at 21:11

## 2018-10-02 RX ADMIN — PROPOFOL 50 MG: 10 INJECTION, EMULSION INTRAVENOUS at 09:22

## 2018-10-02 NOTE — ANESTHESIA PROCEDURE NOTES
Arterial Line      Patient location during procedure: pre-op   Line placed for hemodynamic monitoring.  Performed By   Anesthesiologist: TANIKA CHO  Preanesthetic Checklist  Completed: patient identified, site marked, surgical consent, pre-op evaluation, timeout performed, IV checked, risks and benefits discussed and monitors and equipment checked  Arterial Line Prep   Sterile Tech: cap, gloves and sterile barriers  Prep: ChloraPrep  Patient monitoring: blood pressure monitoring, continuous pulse oximetry and EKG  Arterial Line Procedure   Laterality:right  Location:  radial artery  Catheter size: 20 G   Guidance: ultrasound guided, landmark technique and palpation technique  Number of attempts: 1  Successful placement: yes          Post Assessment   Dressing Type: line sutured, occlusive dressing applied, secured with tape and wrist guard applied.   Complications no  Circ/Move/Sens Assessment: normal and unchanged.   Patient Tolerance: patient tolerated the procedure well with no apparent complications

## 2018-10-02 NOTE — PROGRESS NOTES
[facility] at [facility]  Progress Note       LOS: 1 day   Patient Care Team:  Justin Casas APRN as PCP - General (Family Medicine)    Chief Complaint:  F/u CAD, HTN, dyslipidemia    Subjective     Interval History: S/p CABG x 5 earlier today.  Remains intubated, 40% FiO2.  On low dose levo.  No family currently at bedside.        Review of Systems:   Unable to obtain full ROS as patient is intubated and sedated on vent.      Objective       Current Facility-Administered Medications:   •  acetaminophen (TYLENOL) tablet 650 mg, 650 mg, Oral, Q4H PRN, Shane Bosch MD  •  albumin human 5 % bottle  - ADS Override Pull, , , ,   •  albuterol (PROVENTIL) nebulizer solution 0.083% 2.5 mg/3mL, 2.5 mg, Nebulization, Q4H PRN, Tarik Wheeler PA  •  ALPRAZolam (XANAX) tablet 0.5 mg, 0.5 mg, Oral, BID PRN, Nadir Tanner MD  •  [START ON 10/3/2018] aspirin EC tablet 325 mg, 325 mg, Oral, Daily, Tarik Wheeler PA  •  atorvastatin (LIPITOR) tablet 40 mg, 40 mg, Oral, Nightly, Tarik Wheeler PA  •  bisacodyl (DULCOLAX) EC tablet 10 mg, 10 mg, Oral, Daily PRN, Tairk Wheeler PA  •  [START ON 10/3/2018] bisacodyl (DULCOLAX) suppository 10 mg, 10 mg, Rectal, Daily PRN, Tarik Wheeler PA  •  calcium gluconate 1 g in sodium chloride 0.9 % 100 mL IVPB, 1 g, Intravenous, Once PRN **AND** calcium gluconate 6 g in sodium chloride 0.9 % 500 mL IVPB, 6 g, Intravenous, Once PRN, Tarik Wheeler PA  •  calcium gluconate 2 g in sodium chloride 0.9 % 100 mL IVPB, 2 g, Intravenous, Once PRN, Tarik Wheeler PA  •  cefuroxime (ZINACEF) 1.5 g/100 mL 0.9% NS IVPB (mbp), 1.5 g, Intravenous, Q8H, Tarik Wheeler PA  •  chlorhexidine (PERIDEX) 0.12 % solution 15 mL, 15 mL, Mouth/Throat, Q12H, Tarik Wheeler PA  •  dexmedetomidine (PRECEDEX) infusion in NaCl 400 mcg/100 mL, 0.2-1.5 mcg/kg/hr, Intravenous, Continuous PRN, Tarik Wheeler PA  •  dextrose (D50W) 25 g/ 50mL  Intravenous Solution 25 g, 25 g, Intravenous, Q15 Min PRN, Heidi Cooper PA-C  •  dextrose (GLUTOSE) oral gel 15 g, 15 g, Oral, Q15 Min PRN, Heidi Cooper PA-C  •  dextrose 5 % with KCl 20 mEq/L infusion, 30 mL/hr, Intravenous, Continuous, Tarik Wheeler PA, Last Rate: 30 mL/hr at 10/02/18 1427, 30 mL/hr at 10/02/18 1427  •  DOBUTamine (DOBUTREX) 1 mg/mL infusion, 2-20 mcg/kg/min, Intravenous, Continuous PRN, Tarik Wheeler PA  •  docusate sodium (COLACE) capsule 100 mg, 100 mg, Oral, BID PRN, Tarik Wheeler PA  •  DOPamine 400 mg/250 mL (1.6 mg/mL) infusion, 2-20 mcg/kg/min, Intravenous, Continuous PRN, Tarik Wheeler PA  •  EPINEPHrine (ADRENALIN) 10,000 mcg in sodium chloride 0.9 % 250 mL (40 mcg/mL) infusion, 0.02-0.3 mcg/kg/min, Intravenous, Continuous PRN, Tarik Wheeler PA  •  fentaNYL citrate (PF) (SUBLIMAZE) injection 25 mcg, 25 mcg, Intravenous, Q30 Min PRN **AND** naloxone (NARCAN) injection 0.4 mg, 0.4 mg, Intravenous, Q5 Min PRN, Shane Bosch MD  •  glucagon (human recombinant) (GLUCAGEN DIAGNOSTIC) injection 1 mg, 1 mg, Subcutaneous, PRN, Heidi Cooper PA-C  •  HYDROcodone-acetaminophen (NORCO) 5-325 MG per tablet 1 tablet, 1 tablet, Oral, BID PRN, Nadir Tanner MD, 1 tablet at 09/29/18 1806  •  HYDROcodone-acetaminophen (NORCO) 7.5-325 MG per tablet 1 tablet, 1 tablet, Oral, Q4H PRN, Shane Bosch MD  •  insulin regular (HumuLIN R,NovoLIN R) 100 Units in sodium chloride 0.9 % 100 mL (1 Units/mL) infusion, 0-50 Units/hr, Intravenous, Continuous PRN, Tarik Wheeler PA  •  ipratropium-albuterol (DUO-NEB) nebulizer solution 3 mL, 3 mL, Nebulization, Q4H PRN, Bryan Kirk PA  •  [START ON 10/3/2018] magnesium hydroxide (MILK OF MAGNESIA) suspension 2400 mg/10mL 10 mL, 10 mL, Oral, Daily PRN, Tarik Wheeler PA  •  meperidine (DEMEROL) injection 25 mg, 25 mg, Intravenous, Q4H PRN, Shane Bosch MD  •  [START ON 10/3/2018] metoprolol  tartrate (LOPRESSOR) half tablet 12.5 mg, 12.5 mg, Oral, Q12H, Tarik Wheeler PA  •  metoprolol tartrate (LOPRESSOR) injection 2.5 mg, 2.5 mg, Intravenous, Q6H, Tarik Wheeler PA  •  Morphine sulfate (PF) injection 2 mg, 2 mg, Intravenous, Q30 Min PRN, Shane Bosch MD  •  niCARdipine (CARDENE-IV) 40 mg/200 mL (0.2 mg/mL) in 0.9% NaCl infusion, 5-15 mg/hr, Intravenous, Continuous PRN, Tarik Wheeler PA  •  nitroglycerin 50 mg/250 mL (0.2 mg/mL) infusion, 5-200 mcg/min, Intravenous, Continuous PRN, Tarik Wheeler PA  •  norepinephrine (LEVOPHED) 8 mg/250 mL (32 mcg/mL) in sodium chloride 0.9% infusion (premix), 0.02-0.3 mcg/kg/min, Intravenous, Continuous PRN, Tarik Wheeler PA, Last Rate: 10.5 mL/hr at 10/02/18 1500, 0.08 mcg/kg/min at 10/02/18 1500  •  ondansetron (ZOFRAN) injection 4 mg, 4 mg, Intravenous, Q6H PRN, Tarik Wheeler PA  •  oxyCODONE-acetaminophen (PERCOCET) 5-325 MG per tablet 2 tablet, 2 tablet, Oral, Q4H PRN, Shane Bosch MD  •  Pharmacy Consult - [facility], , Does not apply, Daily, Chad Hu, [facility], Stopped at 10/01/18 1001  •  phenylephrine (TOÑITO-SYNEPHRINE) 50 mg/250 mL (0.2 mg/mL) in 0.9% NS  infusion, 0.5-3 mcg/kg/min, Intravenous, Continuous PRN, Tarik Wheeler PA  •  potassium chloride (MICRO-K) CR capsule 40 mEq, 40 mEq, Oral, PRN **OR** potassium chloride (KLOR-CON) packet 40 mEq, 40 mEq, Oral, PRN, Tarik Wheeler PA  •  potassium chloride 20 mEq in 50 mL IVPB, 20 mEq, Intravenous, Q1H PRN **OR** potassium chloride 20 mEq in 50 mL IVPB, 20 mEq, Intravenous, Q1H PRN, Tarik Wheeler PA, Last Rate: 50 mL/hr at 10/02/18 1508, 20 mEq at 10/02/18 1508  •  Propofol (DIPRIVAN) 10 MG/ML injection  - ADS Override Pull, , , ,   •  propofol (DIPRIVAN) infusion 10 mg/mL 100 mL, 5-50 mcg/kg/min, Intravenous, Continuous PRN, Shane Bosch MD, Last Rate: 21 mL/hr at 10/02/18 1400, 50 mcg/kg/min at 10/02/18 1400  •  protamine 10 MG/ML injection  - ADS  "Override Pull, , , ,   •  racemic epinephrine (RACEPINEPHRINE) nebulizer solution 0.5 mL, 0.5 mL, Nebulization, Once PRN, Tarik Wheeler PA  •  sennosides-docusate sodium (SENOKOT-S) 8.6-50 MG tablet 2 tablet, 2 tablet, Oral, BID, Tarik Wheeler PA  •  sodium bicarbonate injection 8.4% 50 mEq, 50 mEq, Intravenous, Once PRN, Tarik Wheeler PA  •  Vasopressin 20 Units in sodium chloride 0.9 % 100 mL (0.2 Units/mL) infusion, 0.02-0.1 Units/min, Intravenous, Continuous PRN, Tarik Wheeler PA    Vital Sign Min/Max for last 24 hours  Temp  Min: 96 °F (35.6 °C)  Max: 98.2 °F (36.8 °C)   BP  Min: 94/72  Max: 126/84   Pulse  Min: 63  Max: 105   Resp  Min: 16  Max: 18   SpO2  Min: 99 %  Max: 100 %   No Data Recorded   Weight  Min: 69.9 kg (154 lb)  Max: 70 kg (154 lb 6.4 oz)     Flowsheet Rows      First Filed Value   Admission Height  165.1 cm (65\") Documented at 09/28/2018 1346   Admission Weight  72.6 kg (160 lb) Documented at 09/28/2018 1346            Intake/Output Summary (Last 24 hours) at 10/02/18 1519  Last data filed at 10/02/18 1508   Gross per 24 hour   Intake             2190 ml   Output             3155 ml   Net             -965 ml       Physical Exam:     General Appearance:    Intubated and sedated   Lungs:     Clear to auscultation, respirations even on vent    Heart:    Regular rhythm and normal rate, normal S1 and S2, no            murmur, no gallop, + pericardial friction rub   Chest Wall:    Midline dressing C/D/I, mediastinal tubes with sanguinous drainage   Abdomen:     Hypoactive bowel sounds, no masses, soft, non-distended   Extremities:   RLE wrapped, no edema, no cyanosis, no redness   Pulses:   Pulses palpable and equal bilaterally   Skin:   No bleeding, bruising or rash        Results Review:     Results from last 7 days  Lab Units 10/02/18  1359 10/02/18  1324 10/02/18  1310  10/01/18  0828 09/29/18  0600   WBC 10*3/mm3 6.32  --   --   --  7.04 5.50   HEMOGLOBIN g/dL 8.1*  --  "  --   --  12.6* 10.3*   HEMOGLOBIN, POC g/dL  --  7.1* 6.8*  < >  --   --    HEMATOCRIT % 23.0*  --   --   --  36.7* 30.1*   HEMATOCRIT POC %  --  21* 20*  < >  --   --    PLATELETS 10*3/mm3 118*  --   --   --  293 273   < > = values in this interval not displayed.    Results from last 7 days  Lab Units 10/02/18  1359 10/01/18  0828 09/29/18  0600   SODIUM mmol/L 141 137 141   POTASSIUM mmol/L 3.4* 4.1 4.1   CHLORIDE mmol/L 111* 100 105   CO2 mmol/L 24.0 28.0 28.0   BUN mg/dL 13 13 14   CREATININE mg/dL 0.94 0.97 0.85   GLUCOSE mg/dL 82 152* 69*        Results from last 7 days  Lab Units 09/29/18  0600   HEMOGLOBIN A1C % 6.30*       Results from last 7 days  Lab Units 09/29/18  0600   CHOLESTEROL mg/dL 91   TRIGLYCERIDES mg/dL 98   HDL CHOL mg/dL 26*       Results from last 7 days  Lab Units 09/29/18  0600   TSH mIU/mL 1.813           Results from last 7 days  Lab Units 10/02/18  1359 10/01/18  0829  09/28/18  1421   PROTIME Seconds 17.1* 10.3  --  10.1   INR  1.63* 0.98  --  0.96   APTT seconds 27.2 26.1  < >  --    < > = values in this interval not displayed.    Results from last 7 days  Lab Units 09/29/18  0600 09/28/18  1730 09/28/18  1421   TROPONIN I ng/mL <0.006 <0.006 <0.006       Results from last 7 days  Lab Units 10/02/18  1359   MAGNESIUM mg/dL 3.1*       Intake/Output Summary (Last 24 hours) at 10/02/18 1519  Last data filed at 10/02/18 1508   Gross per 24 hour   Intake             2190 ml   Output             3155 ml   Net             -965 ml       I personally viewed and interpreted the patient's EKG/Telemetry data    EKG: Sinus tachycardia,  bpm    Telemetry: NSR/ST, HR  bpm    Ejection Fraction  Lab Results   Component Value Date    EF 65 10/31/2017       Echo EF Estimated  No results found for: ECHOEFEST      Present on Admission:  • Unstable angina (CMS/HCC)  • Coronary artery disease    Assessment/Plan   1. CAD with unstable angina:  - LHC demonstrated severe 3 vessel disease  - S/p  CABG x 5 today with Dr. Bosch  - Continue ASA/statin     2. HTN:  - Post-op hypotension, on low dose norepinephrine, will resume antihypertensives when appropriate     3. HLD:  - LDL 44 on Lipitor 40mg       DANIEL Holley  10/02/18  3:19 PM

## 2018-10-02 NOTE — ANESTHESIA POSTPROCEDURE EVALUATION
Patient: Judah Cerna    Procedure Summary     Date:  10/02/18 Room / Location:   [facility]    Anesthesia Start:  0917 Anesthesia Stop:      Procedure:  MEDIAN STERNOTOMY,CORONARY ARTERY BYPASS WITH INTERNAL MAMMARY ARTERY GRAFT  X 5, EVH OF THE RIGHT GREATER SAPHENOUS VEIN (N/A Chest) Diagnosis:      Surgeon:  Shane Bosch MD Provider:  Gabino Cloud MD    Anesthesia Type:  general ASA Status:  4          Anesthesia Type: general  Last vitals  BP   113/95 (10/02/18 0700)   Temp   97.9 °F (36.6 °C) (10/02/18 0659)   Pulse   75 (10/02/18 0659)   Resp   18 (10/02/18 0659)     SpO2   99 % (10/02/18 0659)     Post Anesthesia Care and Evaluation    Patient location during evaluation: ICU  Patient participation: complete - patient cannot participate  Pain score: 0  Pain management: adequate  Airway patency: patent  Anesthetic complications: No anesthetic complications  PONV Status: none  Cardiovascular status: acceptable and hemodynamically stable  Respiratory status: acceptable, intubated and ventilator  Hydration status: acceptable    Comments: Transport to the ICU on fulll  Monitors, stable thru out, report to the Rn, stable, BBS=

## 2018-10-02 NOTE — ANESTHESIA PROCEDURE NOTES
Central Line      Patient location during procedure: OR  Staff  Anesthesiologist: TANIKA CHO  Preanesthetic Checklist  Completed: patient identified, site marked, surgical consent, pre-op evaluation, timeout performed, IV checked, risks and benefits discussed and monitors and equipment checked  Central Line Prep  Sterile Tech:gloves, cap, gown, mask and sterile barriers  Patient monitoring: blood pressure monitoring, continuous pulse oximetry and EKG  Central Line Procedure  Laterality:right  Location:internal jugular  Catheter Type:Cordis  Catheter Size:9 Fr  Guidance:ultrasound guided, landmark technique and palpation technique  Assessment  Post procedure:biopatch applied, line sutured and occlusive dressing applied  Assessement:blood return through all ports, free fluid flow, chest x-ray ordered and Massimo Test  Complications:no  Patient Tolerance:patient tolerated the procedure well with no apparent complications

## 2018-10-02 NOTE — OP NOTE
Operative Report      Judah Cerna    : 1955  MRN: [id number]  [facility]: [id number]      Date:10/02/18      Preop Diagnosis: unstable angina, hypertension, type II diabetes, 3vessel CAD      Postop Diagnosis:    unstable angina, hypertension, type II diabetes, 3vessel CAD    Procedure: CABGx5 (SVx4, VERITO x 1).[facility]      Surgeon: Shane Bosch MD      Assistant: Jared Wheeler PA-C      Indications: 62-year-old -American male with diabetes and hypertension.  The patient is a lifelong nonsmoker.  Risk factors for vascular disease include hypertension, type 2 diabetes, and dyslipidemia.  No family history of premature atherosclerotic coronary disease.  The patient presented with several episodes of chest pain associated with shortness of breath.  Cardiac catheterization was performed and revealed significant three-vessel coronary artery disease with 100% RCA, 80% LAD, and 90% codominant circumflex.  After consultation with the patient and in concert with Dr. Tanner it was determined that the patient would benefit from coronary artery bypass grafting.      Operative Findings: This gentleman has extensive atherosclerotic coronary artery disease with lesions along all major vessels.  Right coronary artery is diminutive and completely occluded.  The PDA from the circumflex is small area the obtuse marginal branch of the circumflex is a large vessel with significant proximal disease.  The LAD is diseased along its entire course.  The second diagonal branch has high-grade stenosis.  Should be noted that the internal mammary artery from the left was harvested.  This vessel was small flow was somewhat restricted.  Coronary artery anatomy as above was dictated by coronary angiography.  Saphenous vein graft was placed from the aorta to the right PDA 1.5 mm vessel a second saphenous vein graft was placed from the aorta side to side to the large first marginal branch of the circumflex a 2 mm vessel and into side to the  "smaller posterior lateral branch of the circumflex a 1-1/2 mm vessel left internal mammary artery was anastomosed to the LAD at the action of the LAD with the second diagonal branch.  [place] saphenous vein was placed in a Y configuration from the second saphenous vein to the very proximal LAD a 1-1/2 mm vessel.    SDR=479 min    XC=88 min    Ao/SV----->PDA 1.5 mm  Ao/SV----->OM1 2.0mm            ------>PLcx 1.5 mm  \"Y\"       SV----->LAD 1.5m  LIMA -----> D2 1.5mm        EBL: 450 cc  350 cc replaced with cell saver      Operative Narrative: The patient was brought to the operating room and prepared for surgery in the usual manner.  Following induction of general anesthesia an arterial line and a [place] Tio Catheter were placed. The patient was prepped and draped.  A 1 inch incision was made in the [] calf.  The greater saphenous vein was identified and dissected to the groin utilizing endoscopic technique.  Branches were divided with bipolar cautery.  A small incision was made in the groin and the proximal and distal incisions were utilized to place clips on the saphenous vein.  The intervening segment was excised and prepared for implantation by ligating all branches with 4-0 silk.  The incisions were closed with 3-0 Vicryl and 4-0 Monocryl.  The chest was opened through a midline incision.  The sternum was split and the left internal mammary artery was taken down as a pedicle graft. The pericardium was opened.  The patient was heparinized.  Aortic and atrial lines were inserted in the usual manner.  Antegrade and retrograde cardioplegia lines were inserted in the usual manner.  The patient was placed on full cardiopulmonary bypass.  Topical cooling was obtained by adding iced saline to the pericardial well.  The aorta was crossclamped and cold blood cardioplegia solution was infused antegrade and retrograde.  Additional doses of cardioplegia solution were infused in a retrograde manner at 20 minute intervals.  With " the heart in full arrest the distal anastomoses were completed.  The proximal anastomoses were completed and marking rings were placed. The patient was warmed to 37degrees Centegrade.  All air was evacuated from the heart. The aortic cross-clamp was removed. The heart spontaneously converted to a sinus rhythm.  Pacing wires were placed and brought out through a separate stab wound.  #32-[place] chest tubes were placed in the anterior and posterior mediastinum and in the left pleural space.  The patient was weaned from cardiopulmonary bypass.  Protamine was administered. Inspection revealed hemostasis to be adequate.  The chest was closed with #6 sternal wires, 0 Vicryl in the linea alba and fascia, 2-0 Vicryl in the subcutaneous cuticular tissue and 4-0 Monocryl.  Sterile dressings were placed.  The patient remained stable and was moved to the surgical intensive care unit.          Shane Bosch MD  CTSurgery  10/02/18   3:35 PM

## 2018-10-02 NOTE — ANESTHESIA PREPROCEDURE EVALUATION
Anesthesia Evaluation     NPO Solid Status: > 8 hours  NPO Liquid Status: > 8 hours           Airway   Mallampati: II  TM distance: >3 FB  Neck ROM: limited  Possible difficult intubation  Dental - normal exam     Pulmonary - normal exam   (-) asthma, not a smoker  Cardiovascular     ECG reviewed  Rhythm: regular  Rate: normal    (+) hypertension, CAD, cardiac stents (stents X 2 placed 19 years ago per patient)   (-) pacemaker, dysrhythmias, murmur      Neuro/Psych  (-) seizures, TIA, CVA  GI/Hepatic/Renal/Endo    (+)   diabetes mellitus (NIDDM),   (-) liver disease, no renal disease    Musculoskeletal     Abdominal    Substance History      OB/GYN          Other                        Anesthesia Plan    ASA 4     general   (GA  Randolph  A line  Cortis   LEONOR)  intravenous induction   Anesthetic plan, all risks, benefits, and alternatives have been provided, discussed and informed consent has been obtained with: patient.

## 2018-10-02 NOTE — PROGRESS NOTES
INTENSIVIST / PULMONARY FOLLOW UP NOTE     Hospital:  LOS: 1 day   Mr. Judah Cerna, 62 y.o. male is followed for:   Active Problems:    Unstable angina (CMS/HCC)    Coronary artery disease          SUBJECTIVE   Post op 5vCABG by Dr. Bosch, no blood products, on vent, minimal dose of levophed, doing well    The patient's relevant past medical, surgical, family, and social history were reviewed    Allergies and medications were reviewed    ROS:  Per subjective, all other systems were reviewed and were negative        OBJECTIVE     Vital Sign Min/Max for last 24 hours:  Temp  Min: 97.7 °F (36.5 °C)  Max: 98.2 °F (36.8 °C)   BP  Min: 94/72  Max: 113/95   Pulse  Min: 63  Max: 82   Resp  Min: 16  Max: 18   SpO2  Min: 99 %  Max: 99 %   No Data Recorded     Physical Exam:  General Appearance:  Intubated, in no acute distress  Eyes:  No scleral icterus or pallor, pupils normal  Ears, Nose, Mouth, Throat:  Atraumatic, oral endotracheal tube  Neck:  Trachea midline, thyroid normal  Respiratory:  Clear to auscultation bilaterally, normal effort  Cardiovascular:  Regular rate and rhythm, no murmurs, no peripheral edema  Gastrointestinal:  Soft, non-tender, non-distended, no hepatosplenomegaly  Skin:  Normal temperature, no rash  Psychiatric:  Intubated, sedated, no agitation  Neuro:  No new focal neurologic deficits observed      Telemetry:              Hemodynamics:   CVP:     PAP:     PAOP:     CO:     CI:     SVI:     SVR:       SpO2: 99 % SpO2  Min: 99 %  Max: 99 %   Device:      Flow Rate:   No Data Recorded     Mechanical Ventilator Settings:                                         Intake/Ouptut 24 hrs (7:00AM - 6:59 AM)  Intake & Output (last 3 days)       09/29 0701 - 09/30 0700 09/30 0701 - 10/01 0700 10/01 0701 - 10/02 0700 10/02 0701 - 10/03 0700    I.V. (mL/kg) 900 (12.5)   690 (9.9)    IV Piggyback    500    Total Intake(mL/kg) 900 (12.5)   1190 (17)    Urine (mL/kg/hr) 1575 (0.9) 725 (0.4) 650 (0.4) 1650  (3.2)    Chest Tube    260    Total Output 1571     Net -675 -725 -650 -720                  Lines, Drains & Airways    Active LDAs     Name:   Placement date:   Placement time:   Site:   Days:    Pulmonary Artery Catheter - Triple Lumen 10/02/18  10/02/18    0942      less than 1    CVC Double Lumen 10/02/18  10/02/18    0942        less than 1    Peripheral IV 10/01/18 1557 Left Arm  10/01/18    1557    Arm    less than 1    Chest Tube  10/02/18            less than 1    NG/OG Tube Nasogastric Left nostril  10/02/18    1350    Left nostril    less than 1    Urethral Catheter Temperature probe 16 Fr.  10/02/18          less than 1    Y Chest Tube 1 and 2 32 Fr. 32 Fr.  10/02/18          less than 1    ETT   10/02/18    1053 created via procedure documentation    less than 1    Arterial Line 10/02/18 Right Radial  10/02/18    0834    Radial    less than 1    Pacer Wires  10/02/18    1330    Ventricular    less than 1                Hematology:    Results from last 7 days  Lab Units 10/02/18  1324 10/02/18  1310 10/02/18  1238 10/02/18  1214 10/02/18  1126 10/02/18  1115 10/02/18  1032  10/01/18  0828 09/29/18  0600 09/28/18  1421   WBC 10*3/mm3  --   --   --   --   --   --   --   --  7.04 5.50 8.66   HEMOGLOBIN g/dL  --   --   --   --   --   --   --   --  12.6* 10.3* 12.2*   HEMOGLOBIN, POC g/dL 7.1* 6.8* 7.1* 8.2* 7.1* 7.1* 10.2*  < >  --   --   --    HEMATOCRIT %  --   --   --   --   --   --   --   --  36.7* 30.1* 35.8*   HEMATOCRIT POC % 21* 20* 21* 24* 21* 21* 30*  < >  --   --   --    PLATELETS 10*3/mm3  --   --   --   --   --   --   --   --  293 273 360   < > = values in this interval not displayed.  Electrolytes, Magnesium and Phosphorus:    Results from last 7 days  Lab Units 10/01/18  0828 09/29/18  0600 09/28/18  1421   SODIUM mmol/L 137 141 139   CHLORIDE mmol/L 100 105 100   POTASSIUM mmol/L 4.1 4.1 4.1   CO2 mmol/L 28.0 28.0 24.0     Renal:    Results from last 7 days  Lab Units  10/01/18  0828 09/29/18  0600 09/28/18  1421   CREATININE mg/dL 0.97 0.85 0.94   BUN mg/dL 13 14 12     Estimated Creatinine Clearance: 79.2 mL/min (by C-G formula based on SCr of 0.97 mg/dL).  Hepatic:    Results from last 7 days  Lab Units 10/01/18  0828 09/28/18  1421   ALK PHOS U/L 72 67   BILIRUBIN mg/dL 1.8* 1.4*   ALT (SGPT) U/L 30 23   AST (SGOT) U/L 33 29     Arterial Blood Gases:    Results from last 7 days  Lab Units 10/02/18  1324 10/02/18  1310 10/02/18  1238 10/02/18  1214 10/02/18  1126 10/02/18  1115 10/02/18  1032   PH, ARTERIAL pH units 7.46 7.49 7.40 7.52 7.51 7.57 7.47         Results from last 7 days  Lab Units 09/29/18  0600   HEMOGLOBIN A1C % 6.30*       No results found for: LACTATE    Relevant imaging studies and labs from 10/02/18 were reviewed and interpreted by me    Medications (drips):    dexmedetomidine   dextrose 5 % with KCl 20 mEq   DOBUTamine   DOPamine   EPINEPHrine   insulin regular infusion 1 unit/mL (CCU use)   niCARdipine   nitroglycerin   norepinephrine   phenylephrine   propofol   vasopressin         albumin human      [START ON 10/3/2018] aspirin 325 mg Oral Daily   aspirin 325 mg Oral Once   atorvastatin 40 mg Oral Nightly   cefuroxime 1.5 g Intravenous Q8H   chlorhexidine 15 mL Mouth/Throat Q12H   [START ON 10/3/2018] metoprolol tartrate 12.5 mg Oral Q12H   metoprolol tartrate 2.5 mg Intravenous Q6H   pharmacy consult - MTM  Does not apply Daily   Propofol      protamine      protamine 50 mg Intravenous Once   sennosides-docusate sodium 2 tablet Oral BID       Assessment/Plan   IMPRESSION / PLAN     Inpatient Problem List:  62 y.o.male:  Hospital Problem List     Unstable angina (CMS/HCC)    Coronary artery disease           Impression:  62 y.o.male with relevant PMH of CAD 2 stents, recent cath w/ 3vCAD EF 60%, HTN, HLD, NID-T2DM admitted 9/28/2018 w/ chest pain, cath 9/29 w/ 3vCAD, now post op 5vCABG by Dr. Bosch on 10/2    Plan:  Post op care - per cts    Mechanical  Ventilation - wean per protocol    DM A1c 6.3 / Stress Hyperglycemia - insulin gtt    Acute Blood Loss Anemia - also macrocytic, hasn't been transfused yet Hgb 7.1.  Check baseline iron studies, B12, RBC folate.  May benefit from IV iron during hospitalization especially if we can avoid transfusion.    Monitor for complications    Resume appropriate home meds    Mechanical DVT prophylaxis    Nutrition - NPO Diet    Plan of care and goals reviewed with mulitdisciplinary team at daily rounds    Critical Care time spent in direct patient care: 30 minutes (excluding procedure time, if applicable) including high complexity decision making to assess, manipulate, and support vital organ system failure in this individual who has impairment of one or more vital organ systems such that there is a high probability of imminent or life threatening deterioration in the patient’s condition.       Rui Velez MD  Intensive Care Medicine  10/02/18 2:27 PM

## 2018-10-02 NOTE — ANESTHESIA PROCEDURE NOTES
Airway  Urgency: elective      General Information and Staff    Patient location during procedure: OR  Anesthesiologist: TANIKA CHO    Indications and Patient Condition  Indications for airway management: airway protection    Preoxygenated: yes  Mask difficulty assessment: 1 - vent by mask    Final Airway Details  Final airway type: endotracheal airway      Successful airway: ETT  Cuffed: yes   Successful intubation technique: direct laryngoscopy  Facilitating devices/methods: Bougie  Endotracheal tube insertion site: oral  Blade: Roberto  Blade size: 3  ETT size: 7.0 mm  Cormack-Lehane Classification: grade III - view of epiglottis only  Placement verified by: chest auscultation   Measured from: gums  Number of attempts at approach: 1    Additional Comments  Easy mask, patient glottis not seen, epiglottis only, has one of those small curled epiglottis that is deep, got under it with an eschmann on first pass

## 2018-10-03 ENCOUNTER — APPOINTMENT (OUTPATIENT)
Dept: GENERAL RADIOLOGY | Facility: HOSPITAL | Age: 63
End: 2018-10-03

## 2018-10-03 PROBLEM — I20.0 UNSTABLE ANGINA: Status: RESOLVED | Noted: 2018-09-28 | Resolved: 2018-10-03

## 2018-10-03 LAB
ABO + RH BLD: NORMAL
ALBUMIN SERPL-MCNC: 4 G/DL (ref 3.2–4.8)
ANION GAP SERPL CALCULATED.3IONS-SCNC: 5 MMOL/L (ref 3–11)
ARTERIAL PATENCY WRIST A: ABNORMAL
ARTERIAL PATENCY WRIST A: ABNORMAL
ATMOSPHERIC PRESS: ABNORMAL MMHG
ATMOSPHERIC PRESS: ABNORMAL MMHG
BASE EXCESS BLDA CALC-SCNC: -0.8 MMOL/L (ref 0–2)
BASE EXCESS BLDA CALC-SCNC: -0.8 MMOL/L (ref 0–2)
BASOPHILS # BLD AUTO: 0.01 10*3/MM3 (ref 0–0.2)
BASOPHILS NFR BLD AUTO: 0.1 % (ref 0–1)
BDY SITE: ABNORMAL
BDY SITE: ABNORMAL
BH BB BLOOD EXPIRATION DATE: NORMAL
BH BB BLOOD TYPE BARCODE: 5100
BH BB BLOOD TYPE BARCODE: 6200
BH BB BLOOD TYPE BARCODE: 9500
BH BB BLOOD TYPE BARCODE: 9500
BH BB BLOOD TYPE BARCODE: NORMAL
BH BB DISPENSE STATUS: NORMAL
BH BB PRODUCT CODE: NORMAL
BH BB UNIT NUMBER: NORMAL
BUN BLD-MCNC: 17 MG/DL (ref 9–23)
BUN/CREAT SERPL: 15.9 (ref 7–25)
CALCIUM SPEC-SCNC: 8.1 MG/DL (ref 8.7–10.4)
CHLORIDE SERPL-SCNC: 115 MMOL/L (ref 99–109)
CO2 BLDA-SCNC: 24.8 MMOL/L (ref 22–33)
CO2 BLDA-SCNC: 24.9 MMOL/L (ref 22–33)
CO2 SERPL-SCNC: 25 MMOL/L (ref 20–31)
COHGB MFR BLD: 1.6 % (ref 0–2)
COHGB MFR BLD: 1.6 % (ref 0–2)
CREAT BLD-MCNC: 1.07 MG/DL (ref 0.6–1.3)
DEPRECATED RDW RBC AUTO: 70.6 FL (ref 37–54)
DEPRECATED RDW RBC AUTO: 77.7 FL (ref 37–54)
DEPRECATED RDW RBC AUTO: 78.6 FL (ref 37–54)
EOSINOPHIL # BLD AUTO: 0 10*3/MM3 (ref 0–0.3)
EOSINOPHIL NFR BLD AUTO: 0 % (ref 0–3)
ERYTHROCYTE [DISTWIDTH] IN BLOOD BY AUTOMATED COUNT: 20.5 % (ref 11.3–14.5)
ERYTHROCYTE [DISTWIDTH] IN BLOOD BY AUTOMATED COUNT: 22.1 % (ref 11.3–14.5)
ERYTHROCYTE [DISTWIDTH] IN BLOOD BY AUTOMATED COUNT: 22.1 % (ref 11.3–14.5)
FOLATE BLD-MCNC: 241.3 NG/ML
FOLATE RBC-MCNC: 1263 NG/ML
GFR SERPL CREATININE-BSD FRML MDRD: 85 ML/MIN/1.73
GLUCOSE BLD-MCNC: 118 MG/DL (ref 70–100)
GLUCOSE BLDC GLUCOMTR-MCNC: 109 MG/DL (ref 70–130)
GLUCOSE BLDC GLUCOMTR-MCNC: 111 MG/DL (ref 70–130)
GLUCOSE BLDC GLUCOMTR-MCNC: 112 MG/DL (ref 70–130)
GLUCOSE BLDC GLUCOMTR-MCNC: 117 MG/DL (ref 70–130)
GLUCOSE BLDC GLUCOMTR-MCNC: 119 MG/DL (ref 70–130)
GLUCOSE BLDC GLUCOMTR-MCNC: 119 MG/DL (ref 70–130)
GLUCOSE BLDC GLUCOMTR-MCNC: 121 MG/DL (ref 70–130)
GLUCOSE BLDC GLUCOMTR-MCNC: 122 MG/DL (ref 70–130)
GLUCOSE BLDC GLUCOMTR-MCNC: 125 MG/DL (ref 70–130)
GLUCOSE BLDC GLUCOMTR-MCNC: 126 MG/DL (ref 70–130)
GLUCOSE BLDC GLUCOMTR-MCNC: 127 MG/DL (ref 70–130)
GLUCOSE BLDC GLUCOMTR-MCNC: 127 MG/DL (ref 70–130)
GLUCOSE BLDC GLUCOMTR-MCNC: 128 MG/DL (ref 70–130)
GLUCOSE BLDC GLUCOMTR-MCNC: 166 MG/DL (ref 70–130)
HCO3 BLDA-SCNC: 23.7 MMOL/L (ref 20–26)
HCO3 BLDA-SCNC: 23.7 MMOL/L (ref 20–26)
HCT VFR BLD AUTO: 19.1 % (ref 37.5–51)
HCT VFR BLD AUTO: 25.8 % (ref 38.9–50.9)
HCT VFR BLD AUTO: 26.5 % (ref 38.9–50.9)
HCT VFR BLD AUTO: 27.5 % (ref 38.9–50.9)
HCT VFR BLD CALC: 28.2 %
HCT VFR BLD CALC: 28.3 %
HGB BLD-MCNC: 8.7 G/DL (ref 13.1–17.5)
HGB BLD-MCNC: 8.8 G/DL (ref 13.1–17.5)
HGB BLD-MCNC: 9.1 G/DL (ref 13.1–17.5)
HGB BLDA-MCNC: 9.2 G/DL (ref 13.5–17.5)
HGB BLDA-MCNC: 9.2 G/DL (ref 13.5–17.5)
HOROWITZ INDEX BLD+IHG-RTO: 30 %
HOROWITZ INDEX BLD+IHG-RTO: 30 %
IMM GRANULOCYTES # BLD: 0.01 10*3/MM3 (ref 0–0.03)
IMM GRANULOCYTES NFR BLD: 0.1 % (ref 0–0.6)
INR PPP: 1.16 (ref 0.91–1.09)
INR PPP: 1.29 (ref 0.91–1.09)
INR PPP: 1.31 (ref 0.91–1.09)
LYMPHOCYTES # BLD AUTO: 0.41 10*3/MM3 (ref 0.6–4.8)
LYMPHOCYTES NFR BLD AUTO: 5.5 % (ref 24–44)
MAGNESIUM SERPL-MCNC: 2.3 MG/DL (ref 1.3–2.7)
MCH RBC QN AUTO: 31.8 PG (ref 27–31)
MCH RBC QN AUTO: 32.3 PG (ref 27–31)
MCH RBC QN AUTO: 32.3 PG (ref 27–31)
MCHC RBC AUTO-ENTMCNC: 33.1 G/DL (ref 32–36)
MCHC RBC AUTO-ENTMCNC: 33.2 G/DL (ref 32–36)
MCHC RBC AUTO-ENTMCNC: 33.7 G/DL (ref 32–36)
MCV RBC AUTO: 95.7 FL (ref 80–99)
MCV RBC AUTO: 95.9 FL (ref 80–99)
MCV RBC AUTO: 97.5 FL (ref 80–99)
METHGB BLD QL: 1 % (ref 0–1.5)
METHGB BLD QL: 1 % (ref 0–1.5)
MODALITY: ABNORMAL
MODALITY: ABNORMAL
MONOCYTES # BLD AUTO: 0.4 10*3/MM3 (ref 0–1)
MONOCYTES NFR BLD AUTO: 5.4 % (ref 0–12)
NEUTROPHILS # BLD AUTO: 6.61 10*3/MM3 (ref 1.5–8.3)
NEUTROPHILS NFR BLD AUTO: 89 % (ref 41–71)
OXYHGB MFR BLDV: 95.5 % (ref 94–99)
OXYHGB MFR BLDV: 95.6 % (ref 94–99)
PCO2 BLDA: 37.2 MM HG (ref 35–48)
PCO2 BLDA: 37.6 MM HG (ref 35–48)
PH BLDA: 7.41 PH UNITS (ref 7.35–7.45)
PH BLDA: 7.41 PH UNITS (ref 7.35–7.45)
PHOSPHATE SERPL-MCNC: 3.7 MG/DL (ref 2.4–5.1)
PLATELET # BLD AUTO: 148 10*3/MM3 (ref 150–450)
PLATELET # BLD AUTO: 164 10*3/MM3 (ref 150–450)
PLATELET # BLD AUTO: 185 10*3/MM3 (ref 150–450)
PMV BLD AUTO: 10.4 FL (ref 6–12)
PMV BLD AUTO: 10.6 FL (ref 6–12)
PMV BLD AUTO: 11.2 FL (ref 6–12)
PO2 BLDA: 94.8 MM HG (ref 83–108)
PO2 BLDA: 97 MM HG (ref 83–108)
POTASSIUM BLD-SCNC: 4.7 MMOL/L (ref 3.5–5.5)
PROTHROMBIN TIME: 12.2 SECONDS (ref 9.6–11.5)
PROTHROMBIN TIME: 13.5 SECONDS (ref 9.6–11.5)
PROTHROMBIN TIME: 13.8 SECONDS (ref 9.6–11.5)
RBC # BLD AUTO: 2.69 10*6/MM3 (ref 4.2–5.76)
RBC # BLD AUTO: 2.77 10*6/MM3 (ref 4.2–5.76)
RBC # BLD AUTO: 2.82 10*6/MM3 (ref 4.2–5.76)
SODIUM BLD-SCNC: 145 MMOL/L (ref 132–146)
UNIT  ABO: NORMAL
UNIT  RH: NORMAL
WBC NRBC COR # BLD: 7.43 10*3/MM3 (ref 3.5–10.8)
WBC NRBC COR # BLD: 9.23 10*3/MM3 (ref 3.5–10.8)
WBC NRBC COR # BLD: 9.55 10*3/MM3 (ref 3.5–10.8)

## 2018-10-03 PROCEDURE — 25010000002 NA FERRIC GLUC CPLX PER 12.5 MG: Performed by: INTERNAL MEDICINE

## 2018-10-03 PROCEDURE — 99232 SBSQ HOSP IP/OBS MODERATE 35: CPT | Performed by: INTERNAL MEDICINE

## 2018-10-03 PROCEDURE — 97162 PT EVAL MOD COMPLEX 30 MIN: CPT

## 2018-10-03 PROCEDURE — 85610 PROTHROMBIN TIME: CPT | Performed by: PHYSICIAN ASSISTANT

## 2018-10-03 PROCEDURE — 25010000002 FENTANYL CITRATE (PF) 100 MCG/2ML SOLUTION: Performed by: THORACIC SURGERY (CARDIOTHORACIC VASCULAR SURGERY)

## 2018-10-03 PROCEDURE — 94799 UNLISTED PULMONARY SVC/PX: CPT

## 2018-10-03 PROCEDURE — 80069 RENAL FUNCTION PANEL: CPT | Performed by: PHYSICIAN ASSISTANT

## 2018-10-03 PROCEDURE — 85027 COMPLETE CBC AUTOMATED: CPT | Performed by: INTERNAL MEDICINE

## 2018-10-03 PROCEDURE — 99024 POSTOP FOLLOW-UP VISIT: CPT | Performed by: PHYSICIAN ASSISTANT

## 2018-10-03 PROCEDURE — 85025 COMPLETE CBC W/AUTO DIFF WBC: CPT | Performed by: PHYSICIAN ASSISTANT

## 2018-10-03 PROCEDURE — 99291 CRITICAL CARE FIRST HOUR: CPT | Performed by: INTERNAL MEDICINE

## 2018-10-03 PROCEDURE — 71045 X-RAY EXAM CHEST 1 VIEW: CPT

## 2018-10-03 PROCEDURE — 93005 ELECTROCARDIOGRAM TRACING: CPT | Performed by: PHYSICIAN ASSISTANT

## 2018-10-03 PROCEDURE — 82962 GLUCOSE BLOOD TEST: CPT

## 2018-10-03 PROCEDURE — 94760 N-INVAS EAR/PLS OXIMETRY 1: CPT

## 2018-10-03 PROCEDURE — 36430 TRANSFUSION BLD/BLD COMPNT: CPT

## 2018-10-03 PROCEDURE — 86900 BLOOD TYPING SEROLOGIC ABO: CPT

## 2018-10-03 PROCEDURE — 82805 BLOOD GASES W/O2 SATURATION: CPT | Performed by: INTERNAL MEDICINE

## 2018-10-03 PROCEDURE — 94003 VENT MGMT INPAT SUBQ DAY: CPT

## 2018-10-03 PROCEDURE — 93010 ELECTROCARDIOGRAM REPORT: CPT | Performed by: INTERNAL MEDICINE

## 2018-10-03 PROCEDURE — 85610 PROTHROMBIN TIME: CPT | Performed by: INTERNAL MEDICINE

## 2018-10-03 PROCEDURE — 83735 ASSAY OF MAGNESIUM: CPT | Performed by: PHYSICIAN ASSISTANT

## 2018-10-03 PROCEDURE — 99024 POSTOP FOLLOW-UP VISIT: CPT | Performed by: THORACIC SURGERY (CARDIOTHORACIC VASCULAR SURGERY)

## 2018-10-03 PROCEDURE — 25010000002 CEFUROXIME: Performed by: PHYSICIAN ASSISTANT

## 2018-10-03 PROCEDURE — 25010000002 MORPHINE SULFATE (PF) 2 MG/ML SOLUTION: Performed by: THORACIC SURGERY (CARDIOTHORACIC VASCULAR SURGERY)

## 2018-10-03 PROCEDURE — P9016 RBC LEUKOCYTES REDUCED: HCPCS

## 2018-10-03 RX ORDER — CALCIUM CHLORIDE 100 MG/ML
1 INJECTION INTRAVENOUS; INTRAVENTRICULAR ONCE
Status: COMPLETED | OUTPATIENT
Start: 2018-10-03 | End: 2018-10-03

## 2018-10-03 RX ORDER — NICOTINE POLACRILEX 4 MG
15 LOZENGE BUCCAL
Status: DISCONTINUED | OUTPATIENT
Start: 2018-10-03 | End: 2018-10-04

## 2018-10-03 RX ORDER — DEXTROSE MONOHYDRATE 25 G/50ML
25 INJECTION, SOLUTION INTRAVENOUS
Status: DISCONTINUED | OUTPATIENT
Start: 2018-10-03 | End: 2018-10-04

## 2018-10-03 RX ADMIN — ALPRAZOLAM 0.5 MG: 0.5 TABLET ORAL at 19:54

## 2018-10-03 RX ADMIN — ACETAMINOPHEN 650 MG: 325 TABLET ORAL at 00:14

## 2018-10-03 RX ADMIN — SODIUM CHLORIDE 250 MG: 9 INJECTION, SOLUTION INTRAVENOUS at 16:17

## 2018-10-03 RX ADMIN — OXYCODONE HYDROCHLORIDE AND ACETAMINOPHEN 2 TABLET: 5; 325 TABLET ORAL at 12:04

## 2018-10-03 RX ADMIN — OXYCODONE HYDROCHLORIDE AND ACETAMINOPHEN 2 TABLET: 5; 325 TABLET ORAL at 16:18

## 2018-10-03 RX ADMIN — CEFUROXIME 1.5 G: 1.5 INJECTION, POWDER, FOR SOLUTION INTRAVENOUS at 22:01

## 2018-10-03 RX ADMIN — ASPIRIN 325 MG: 325 TABLET, DELAYED RELEASE ORAL at 09:19

## 2018-10-03 RX ADMIN — DOCUSATE SODIUM AND SENNOSIDES 2 TABLET: 50; 8.6 TABLET ORAL at 08:30

## 2018-10-03 RX ADMIN — DOCUSATE SODIUM AND SENNOSIDES 2 TABLET: 50; 8.6 TABLET ORAL at 19:55

## 2018-10-03 RX ADMIN — MORPHINE SULFATE 2 MG: 2 INJECTION, SOLUTION INTRAMUSCULAR; INTRAVENOUS at 19:55

## 2018-10-03 RX ADMIN — OXYCODONE HYDROCHLORIDE AND ACETAMINOPHEN 2 TABLET: 5; 325 TABLET ORAL at 07:14

## 2018-10-03 RX ADMIN — CEFUROXIME 1.5 G: 1.5 INJECTION, POWDER, FOR SOLUTION INTRAVENOUS at 06:10

## 2018-10-03 RX ADMIN — CEFUROXIME 1.5 G: 1.5 INJECTION, POWDER, FOR SOLUTION INTRAVENOUS at 16:17

## 2018-10-03 RX ADMIN — CALCIUM CHLORIDE 1 G: 100 INJECTION INTRAVENOUS; INTRAVENTRICULAR at 09:19

## 2018-10-03 RX ADMIN — CHLORHEXIDINE GLUCONATE 15 ML: 1.2 RINSE ORAL at 08:30

## 2018-10-03 RX ADMIN — NOREPINEPHRINE BITARTRATE 0.16 MCG/KG/MIN: 8 SOLUTION at 06:10

## 2018-10-03 RX ADMIN — MORPHINE SULFATE 2 MG: 2 INJECTION, SOLUTION INTRAMUSCULAR; INTRAVENOUS at 13:23

## 2018-10-03 RX ADMIN — FENTANYL CITRATE 25 MCG: 50 INJECTION, SOLUTION INTRAMUSCULAR; INTRAVENOUS at 03:39

## 2018-10-03 RX ADMIN — HYDROCODONE BITARTRATE AND ACETAMINOPHEN 1 TABLET: 7.5; 325 TABLET ORAL at 19:54

## 2018-10-03 RX ADMIN — MORPHINE SULFATE 2 MG: 2 INJECTION, SOLUTION INTRAMUSCULAR; INTRAVENOUS at 07:11

## 2018-10-03 RX ADMIN — ATORVASTATIN CALCIUM 40 MG: 40 TABLET, FILM COATED ORAL at 19:54

## 2018-10-03 NOTE — PROGRESS NOTES
Clinical Nutrition     Multidisciplinary Rounds    Time: 20min  Patient Name: Judah Cerna  Date of Encounter: 10/03/18 9:15 AM  MRN: [id number]  Admission date: 9/28/2018      Reason for visit: MDR. RD to continue to follow per protocol.     Additional information obtained during MDR:  S/P CABG x 5 10/2.  Extubated this morning.  Passed nsg post extubation swallow eval.  Good PO intake during hospital course prior to surgery.     GTT:Levo 0.14mcg/kg/min,     Current diet: NPO Diet    EMR reviewed     Intervention:  Follow treatment plan  Care plan reviewed  Start clear liquids as tolerated     Follow up:   Per protocol      Lilliana Vasquez RD  9:15 AM

## 2018-10-03 NOTE — PROGRESS NOTES
CTS Progress Note      POD 1 s/p CABGX5       LOS: 2 days   Patient Care Team:  Justin Casas APRN as PCP - General (Family Medicine)    Subjective  Just extubated at 7 am  On levo at 0.14m/k/m for support  Awake, alert , cooperative    CC: S/P CABGX5    Objective    Vital Signs  Temp:  [95.6 °F (35.3 °C)-101.7 °F (38.7 °C)] 101 °F (38.3 °C)  Heart Rate:  [] 87  Resp:  [12-24] 12  BP: ()/(40-84) 105/69  Arterial Line BP: ()/(41-77) 111/58  FiO2 (%):  [30 %-40 %] 30 %    Physical Exam:   General Appearance: alert, appears stated age and cooperative   Lungs: clear to auscultation, respirations regular, respirations even and respirations unlabored   Heart: regular rhythm & normal rate, normal S1, S2, no murmur, no gallop, no rub and no click   Skin:  Incision c/d/i     Results     Results from last 7 days  Lab Units 10/03/18  0345   WBC 10*3/mm3 7.43   HEMOGLOBIN g/dL 8.7*   HEMATOCRIT % 25.8*   PLATELETS 10*3/mm3 185       Results from last 7 days  Lab Units 10/03/18  0345   SODIUM mmol/L 145   POTASSIUM mmol/L 4.7   CHLORIDE mmol/L 115*   CO2 mmol/L 25.0   BUN mg/dL 17   CREATININE mg/dL 1.07   GLUCOSE mg/dL 118*   CALCIUM mg/dL 8.1*           Imaging Results (last 24 hours)     Procedure Component Value Units Date/Time    XR Chest 1 View [[id number]] Collected:  10/03/18 0820     Updated:  10/03/18 0900    Narrative:       EXAMINATION: XR CHEST 1 VW- 10/03/2018     INDICATION: Post-Op Heart Surgery; I20.0-Unstable angina;  I25.110-Atherosclerotic heart disease of native coronary artery with  unstable angina pectoris      COMPARISON: 10/02/2018     FINDINGS: Support hardware unchanged and in satisfactory positioning.  Cardiac silhouette borderline enlarged with increasing right basilar  opacifications which may represent layering effusion component and/or  worsening airspace disease/atelectasis.           Impression:       Slight increased hazy opacifications occupying the right  lower lung may  represent layering effusion component or worsening  atelectasis versus airspace disease.     D:  10/03/2018  E:  10/03/2018     This report was finalized on 10/3/2018 8:58 AM by Dr. Jay Davenport.       XR Chest 1 View [[id number]] Collected:  10/02/18 1448     Updated:  10/02/18 1643    Narrative:       EXAMINATION: XR CHEST 1 VW- 10/02/2018     INDICATION: Post-Op Check Line & Tube Placement; I20.0-Unstable  angina; I25.110-Atherosclerotic heart disease of native coronary artery  with unstable angina pectoris      COMPARISON: 09/20/2018     FINDINGS: Portable chest reveals patient to be status post median  sternotomy. The lines and tubes are in satisfactory position. Chest tube  identified on the left with no pneumothorax. Degenerative changes seen  within the spine. Pulmonary vascularity is within normal limits. Small  bilateral pleural effusions.           Impression:       Status post median sternotomy with lines and tubes in  satisfactory position. No pneumothorax with small bilateral pleural  effusions.     D:  10/02/2018  E:  10/02/2018     This report was finalized on 10/2/2018 4:41 PM by Dr. Quiana Thapa MD.             Assessment    Active Problems:    Unstable angina (CMS/HCC)    Coronary artery disease  CT 1400cc out last 18 hours, slowing      Plan   Just extubated  Will D/C suri altamirano foley Thomas J. Garrison, PA  10/03/18  10:03 AM     CT drainage as noted.  Patient had received Plavix and was anemic pre op. Stable course to this point.  I have reviewed, verified, and confirmed the above history and current status.  I have examined the patient and confirmed the above physical findings.Above plan and treatment regimen discussed in detail with patient.  Options of treatment, attendant risks vs benefits, and my recommendations were discussed and all questions answered.    Shane Bosch MD  CTSurgery  10/03/18   2:15 PM

## 2018-10-03 NOTE — PROGRESS NOTES
"[facility]  IP Progress Note   LOS: 2 days   Patient Care Team:  Justin Casas APRN as PCP - General (Family Medicine)    Chief Complaint: Follow up for Coronary Artery Disease, hypertension and dyslipidemia    Subjective Denies Chest Pain, Denies Dyspnea, Not Eating, still has NG tube,  Not Ambulating Yet.     Problem   Cad (Coronary Artery Disease)    a. -History of \"small heart attack,\" in 1999. cardiac catheterization study followed by 2 coronary stents; incomplete database.  b. Cardiolite stress test, 09/23/2014, was normal with excellent/above average exercise capacity and ejection fraction 55%.  c. Echocardiogram, 09/23/2014, showed ejection fraction 65% with mild mitral and mild tricuspid regurgitation.   d. [facility] 9-29-18:  · Mildly abnormal left ventriculogram with an estimated ejection fraction of 60% and mild apical hypokinesia  · Severe 3 vessel disease that is associated with an acute coronary syndrome in a younger diabetic.    E. CABGx5 (SVx4, VERITO x 1).[facility] 10-2-2018     Unstable Angina (Cms/Hcc) (Resolved)     Tele: Sinus Rythym    Vitals:  Blood pressure 105/69, pulse 87, temperature (!) 101 °F (38.3 °C), temperature source Core, resp. rate 12, height 165.1 cm (65\"), weight 69.9 kg (154 lb), SpO2 100 %.     Intake/Output Summary (Last 24 hours) at 10/03/18 1007  Last data filed at 10/03/18 0600   Gross per 24 hour   Intake             7104 ml   Output             4419 ml   Net             2685 ml       Physical Exam:      General: alert, no acute distress, acyanotic, well developed, well nourished   Chest: Clear Auscultation and No Wheezes   CV: Heart sounds are normal.  Regular rate and rhythm without murmur, gallop or rub.   Extremities: negative    Results Review:     I reviewed the patient's new clinical results.      Results from last 7 days  Lab Units 10/03/18  0345   WBC 10*3/mm3 7.43   HEMOGLOBIN g/dL 8.7*   HEMATOCRIT % 25.8*   PLATELETS 10*3/mm3 185 "       Results from last 7 days  Lab Units 10/03/18  0345  10/01/18  0828   SODIUM mmol/L 145  < > 137   POTASSIUM mmol/L 4.7  < > 4.1   CHLORIDE mmol/L 115*  < > 100   CO2 mmol/L 25.0  < > 28.0   BUN mg/dL 17  < > 13   CREATININE mg/dL 1.07  < > 0.97   CALCIUM mg/dL 8.1*  < > 9.4   BILIRUBIN mg/dL  --   --  1.8*   ALK PHOS U/L  --   --  72   ALT (SGPT) U/L  --   --  30   AST (SGOT) U/L  --   --  33   GLUCOSE mg/dL 118*  < > 152*   < > = values in this interval not displayed.    Scheduled Meds:  aspirin 325 mg Oral Daily   atorvastatin 40 mg Oral Nightly   cefuroxime 1.5 g Intravenous Q8H   chlorhexidine 15 mL Mouth/Throat Q12H   pharmacy consult - MTM  Does not apply Daily   sennosides-docusate sodium 2 tablet Oral BID       Continuous Infusions:  dexmedetomidine 0.2-1.5 mcg/kg/hr Last Rate: Stopped (10/03/18 0515)   DOBUTamine 2-20 mcg/kg/min    DOPamine 2-20 mcg/kg/min    EPINEPHrine 0.02-0.3 mcg/kg/min    insulin regular infusion 1 unit/mL (CCU use) 0-50 Units/hr Last Rate: 1.4 Units/hr (10/03/18 0536)   niCARdipine 5-15 mg/hr    nitroglycerin 5-200 mcg/min    norepinephrine 0.02-0.3 mcg/kg/min Last Rate: 0.16 mcg/kg/min (10/03/18 0610)   phenylephrine 0.5-3 mcg/kg/min    propofol 5-50 mcg/kg/min Last Rate: 50 mcg/kg/min (10/02/18 1400)   vasopressin 0.02-0.1 Units/min      Assessment:  1. Hypertension, currently on Norepi  2. Dyslipidemia  3. CAD, S/P CABG x 5, NL LV    Plan:  1. Continue to monitor, still has mediastinal tubes.   2.  Wean off vasopressors, monitor blood pressure.  3. On statin    GREGG Amaya  10/03/18  10:07 AM    I have seen and examined the patient, case was discussed with the physician extender, reviewed the above note, necessary changes were made and I agree with the final note.   Kay Stevens MD, FACC, [facility]

## 2018-10-03 NOTE — PROGRESS NOTES
Continued Stay Note  [facility]     Patient Name: Judah Cerna  MRN: [id number]  Today's Date: 10/3/2018    Admit Date: 9/28/2018          Discharge Plan     Row Name 10/03/18 1132       Plan    Plan Home     Patient/Family in Agreement with Plan yes    Plan Comments Patient in ICU, post op CABG x5 on 10-2. Plan still remains home at discharge with wife Fe to help. CM following Nisreen @ [phone]     Final Discharge Disposition Code 01 - home or self-care              Discharge Codes    No documentation.       Expected Discharge Date and Time     Expected Discharge Date Expected Discharge Time    Oct 5, 2018             Radha Guevara RN

## 2018-10-03 NOTE — PROGRESS NOTES
INTENSIVIST / PULMONARY FOLLOW UP NOTE     Hospital:  LOS: 2 days   Mr. Judah Cerna, 62 y.o. male is followed for:   Active Problems:    * No active hospital problems. *          SUBJECTIVE   Had bleeding from chest tubes overnight, received 4FFP, 3 PRBC, 2 platelets.  More stable this morning.    The patient's relevant past medical, surgical, family, and social history were reviewed    Allergies and medications were reviewed    ROS:  Per subjective, all other systems were reviewed and were negative        OBJECTIVE     Vital Sign Min/Max for last 24 hours:  Temp  Min: 95.6 °F (35.3 °C)  Max: 101.7 °F (38.7 °C)   BP  Min: 74/60  Max: 131/40   Pulse  Min: 86  Max: 125   Resp  Min: 12  Max: 24   SpO2  Min: 97 %  Max: 100 %   No Data Recorded     Physical Exam:  General Appearance:  Alert, in no acute distress  Eyes:  No scleral icterus or pallor, pupils normal  Ears, Nose, Mouth, Throat:  Atraumatic, oropharynx clear  Neck:  Trachea midline, thyroid normal  Respiratory:  Clear to auscultation bilaterally, normal effort  Cardiovascular:  Regular rate and rhythm, no murmurs, no peripheral edema  Gastrointestinal:  Soft, non-tender, non-distended, no hepatosplenomegaly  Skin:  Normal temperature, no rash  Psychiatric:  Normal mood and affect, normal judgement and insight  Neuro:  No new focal neurologic deficits observed      Telemetry:              Hemodynamics:   CVP: CVP (mmHg): 6 mmHg   PAP: PAP: 29/10   PAOP: PCWP (mmHg): 9 mmHg   CO: CO (L/min): 4.5 L/min   CI: CI (L/min/m2): 2.54 L/min/m2   SVI: SVI: 28.86   SVR: SVR (dyne*sec)/cm5: 1671.11     SpO2: 99 % SpO2  Min: 97 %  Max: 100 %   Device:      Flow Rate:   No Data Recorded       Intake/Ouptut 24 hrs (7:00AM - 6:59 AM)  Intake & Output (last 3 days)       09/30 0701 - 10/01 0700 10/01 0701 - 10/02 0700 10/02 0701 - 10/03 0700 10/03 0701 - 10/04 0700    P.O.    250    I.V. (mL/kg)   2105 (30.1)     Blood   2860     Other    80    IV Piggyback   9738      Total Intake(mL/kg)   7104 (101.6) 330 (4.7)    Urine (mL/kg/hr) 725 (0.4) 650 (0.4) 3019 (1.8) 200 (0.6)    Chest Tube   1400 120    Total Output  320    Net -725 -650 +2685 +10                  Lines, Drains & Airways    Active LDAs     Name:   Placement date:   Placement time:   Site:   Days:    Pulmonary Artery Catheter - Triple Lumen 10/02/18  10/02/18    0942      less than 1    CVC Double Lumen 10/02/18  10/02/18    0942        less than 1    Peripheral IV 10/01/18 1557 Left Arm  10/01/18    1557    Arm    less than 1    Chest Tube  10/02/18            less than 1    NG/OG Tube Nasogastric Left nostril  10/02/18    1350    Left nostril    less than 1    Urethral Catheter Temperature probe 16 Fr.  10/02/18          less than 1    Y Chest Tube 1 and 2 32 Fr. 32 Fr.  10/02/18          less than 1    ETT   10/02/18    1053 created via procedure documentation    less than 1    Arterial Line 10/02/18 Right Radial  10/02/18    0834    Radial    less than 1    Pacer Wires  10/02/18    1330    Ventricular    less than 1                Hematology:    Results from last 7 days  Lab Units 10/03/18  0345 10/02/18  2157 10/02/18  2046 10/02/18  1725 10/02/18  1359 10/02/18  1324 10/02/18  1310  10/01/18  0828 09/29/18  0600   WBC 10*3/mm3 7.43 7.14 6.97 8.10 6.32  --   --   --  7.04 5.50   HEMOGLOBIN g/dL 8.7* 7.2* 7.5* 5.3* 8.1*  --   --   --  12.6* 10.3*   HEMOGLOBIN, POC g/dL  --   --   --   --   --  7.1* 6.8*  < >  --   --    HEMATOCRIT % 25.8* 21.2* 22.2* 15.3* 23.0*  --   --   --  36.7* 30.1*   HEMATOCRIT POC %  --   --   --   --   --  21* 20*  < >  --   --    PLATELETS 10*3/mm3 185 182 174 210 118*  --   --   --  293 273   < > = values in this interval not displayed.  Electrolytes, Magnesium and Phosphorus:    Results from last 7 days  Lab Units 10/03/18  0345 10/02/18  2157 10/02/18  1725 10/02/18  1359 10/01/18  0828 09/29/18  0600 09/28/18  1421   SODIUM mmol/L 145 144 143 141 137 141 139   CHLORIDE  mmol/L 115* 113* 107 111* 100 105 100   POTASSIUM mmol/L 4.7 3.9 3.9 3.4* 4.1 4.1 4.1   CO2 mmol/L 25.0 24.0 25.0 24.0 28.0 28.0 24.0   MAGNESIUM mg/dL 2.3 2.4 2.7 3.1*  --   --   --    PHOSPHORUS mg/dL 3.7 1.1* 1.2* 2.0*  --   --   --      Renal:    Results from last 7 days  Lab Units 10/03/18  0345 10/02/18  2157 10/02/18  1725 10/02/18  1359 10/01/18  0828 09/29/18  0600 09/28/18  1421   CREATININE mg/dL 1.07 1.10 1.11 0.94 0.97 0.85 0.94   BUN mg/dL 17 16 14 13 13 14 12     Estimated Creatinine Clearance: 71.7 mL/min (by C-G formula based on SCr of 1.07 mg/dL).  Hepatic:    Results from last 7 days  Lab Units 10/01/18  0828 09/28/18  1421   ALK PHOS U/L 72 67   BILIRUBIN mg/dL 1.8* 1.4*   ALT (SGPT) U/L 30 23   AST (SGOT) U/L 33 29     Arterial Blood Gases:    Results from last 7 days  Lab Units 10/03/18  0700 10/03/18  0631 10/02/18  2044 10/02/18  1508 10/02/18  1324 10/02/18  1310 10/02/18  1238   PH, ARTERIAL pH units 7.409 7.411 7.343* 7.481* 7.46 7.49 7.40   PCO2, ARTERIAL mm Hg 37.6 37.2 44.5 32.0*  --   --   --    PO2 ART mm Hg 94.8 97.0 115.0* 480.0*  --   --   --    FIO2 % 30 30 35 100  --   --   --          Results from last 7 days  Lab Units 09/29/18  0600   HEMOGLOBIN A1C % 6.30*       No results found for: LACTATE    Relevant imaging studies and labs from 10/03/18 were reviewed and interpreted by me    Medications (drips):    insulin regular infusion 1 unit/mL (CCU use) Last Rate: 1.4 Units/hr (10/03/18 0536)   niCARdipine    nitroglycerin    norepinephrine Last Rate: 0.14 mcg/kg/min (10/03/18 0900)   phenylephrine          aspirin 325 mg Oral Daily   atorvastatin 40 mg Oral Nightly   cefuroxime 1.5 g Intravenous Q8H   insulin lispro 0-9 Units Subcutaneous 4x Daily With Meals & Nightly   pharmacy consult - MTM  Does not apply Daily   sennosides-docusate sodium 2 tablet Oral BID       Assessment/Plan   IMPRESSION / PLAN     Inpatient Problem List:  62 y.o.male:       Impression:  62 y.o.male with  relevant PMH of CAD 2 stents, recent cath w/ 3vCAD EF 60%, HTN, HLD, NID-T2DM admitted 9/28/2018 w/ chest pain, cath 9/29 w/ 3vCAD, now post op 5vCABG by Dr. Bosch on 10/2.  Had some post op bleeding improved w/ 4 FFP, 2 Platelets, 3 PRBC.    Plan:  Post op care - per cts    DM A1c 6.3 / Stress Hyperglycemia - transition to SQ insulin    Hypotension - wean off pressors as tolerated    Acute Blood Loss Anemia - also macrocytic, hasn't been transfused yet Hgb dropped to 5.3. Check baseline iron studies, B12, RBC folate.  Will give IV Iron.  Give 1 amp calcium for low ionized level.  Monitor CBC/INR q6 today.    Monitor for complications    Resume appropriate home meds    Mechanical DVT prophylaxis    Nutrition - Diet Clear Liquid; Consistent Carbohydrate, Cardiac    Plan of care and goals reviewed with mulitdisciplinary team at daily rounds    Critical Care time spent in direct patient care: 30 minutes (excluding procedure time, if applicable) including high complexity decision making to assess, manipulate, and support vital organ system failure in this individual who has impairment of one or more vital organ systems such that there is a high probability of imminent or life threatening deterioration in the patient’s condition.       Rui Velez MD  Intensive Care Medicine  10/03/18 11:48 AM

## 2018-10-03 NOTE — PLAN OF CARE
Problem: Patient Care Overview  Goal: Plan of Care Review  Outcome: Ongoing (interventions implemented as appropriate)   10/03/18 7565   Coping/Psychosocial   Plan of Care Reviewed With patient   OTHER   Outcome Summary PT initial evaluation completed for pt s/p CABG x5 presenting with increased SOA, pain, and decreased functional mobility. Pt ambulated 70ft with CGA 1+1. Pt's decreased independence warrants PT skilled care. Recommend D/C home with assistance.

## 2018-10-03 NOTE — THERAPY EVALUATION
Acute Care - Physical Therapy Initial Evaluation  [facility]     Patient Name: Judah Cerna  : 1955  MRN: [id number]  Today's Date: 10/3/2018   Onset of Illness/Injury or Date of Surgery: 18  Date of Referral to PT: 10/02/18  Referring Physician: GREGG Wheeler      Admit Date: 2018    Visit Dx:     ICD-10-CM ICD-9-CM   1. Unstable angina (CMS/HCC) I20.0 411.1   2. Coronary artery disease involving native coronary artery of native heart with unstable angina pectoris (CMS/HCC) I25.110 414.01     411.1   3. 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   • CARDIAC CATHETERIZATION     • CARDIAC CATHETERIZATION N/A 2018    Procedure: Left Heart Cath;  Surgeon: Nadir Tanner MD;  Location: [facility] CATH INVASIVE LOCATION;  Service: Cardiology   • CORONARY ARTERY BYPASS GRAFT N/A 10/2/2018    Procedure: MEDIAN STERNOTOMY,CORONARY ARTERY BYPASS WITH INTERNAL MAMMARY ARTERY GRAFT  X 5, EVH OF THE RIGHT GREATER SAPHENOUS VEIN;  Surgeon: Shane Bosch MD;  Location: [facility] OR;  Service: Cardiothoracic   • CORONARY STENT PLACEMENT      x 2        PT ASSESSMENT (last 12 hours)      Physical Therapy Evaluation     Row Name 10/03/18 1336          PT Evaluation Time/Intention    Subjective Information no complaints  -KR     Document Type evaluation  -KR     Mode of Treatment physical therapy  -KR     Patient Effort good  -KR     Symptoms Noted During/After Treatment increased pain;shortness of breath  -KR     Row Name 10/03/18  1336          General Information    Patient Profile Reviewed? yes  -KR     Onset of Illness/Injury or Date of Surgery 09/28/18  -KR     Referring Physician GREGG Wheeler  -KR     Patient Observations alert;cooperative;agree to therapy  -KR     Prior Level of Function independent:;all household mobility;gait;transfer;ADL's;dressing;bathing  -KR     Equipment Currently Used at Home none  -KR     Pertinent History of Current Functional Problem Pt presents to ED with c/o substernal chest pain. Pt reports that discomfort has been worsening for past three days and typically radiates to his neck. He also c/o SOA and a cough. CABG x5 on 10/02/18.   -KR     Existing Precautions/Restrictions cardiac;fall;oxygen therapy device and L/min;sternal  -KR     Risks Reviewed patient:;spouse/S.O.:;LOB;dizziness;increased discomfort;change in vital signs  -KR     Benefits Reviewed patient:;spouse/S.O.:;improve function;increase independence;increase strength;increase balance  -KR     Barriers to Rehab none identified  -KR     Row Name 10/03/18 1336          Relationship/Environment    Lives With spouse;grandchild(karlene)  -KR     Row Name 10/03/18 1336          Resource/Environmental Concerns    Current Living Arrangements home/apartment/condo  -KR     Resource/Environmental Concerns none  -KR     Transportation Concerns car, none  -KR     Row Name 10/03/18 1336          Home Main Entrance    Number of Stairs, Main Entrance three  -KR     Stair Railings, Main Entrance none  -KR     Row Name 10/03/18 1336          Stairs Within Home, Primary    Number of Stairs, Within Home, Primary other (see comments)   13  -KR     Stair Railings, Within Home, Primary railings on both sides of stairs  -KR     Row Name 10/03/18 1336          Cognitive Assessment/Interventions    Additional Documentation Cognitive Assessment/Intervention (Group)  -KR     Row Name 10/03/18 1336          Cognitive Assessment/Intervention- PT/OT    Affect/Mental Status  (Cognitive) WFL  -KR     Orientation Status (Cognition) oriented x 4  -KR     Follows Commands (Cognition) WFL  -KR     Cognitive Function (Cognitive) WFL  -KR     Personal Safety Interventions fall prevention program maintained;gait belt;nonskid shoes/slippers when out of bed  -KR     Row Name 10/03/18 1336          Safety Issues, Functional Mobility    Impairments Affecting Function (Mobility) balance;endurance/activity tolerance;shortness of breath;strength;pain  -KR     Row Name 10/03/18 1336          Bed Mobility Assessment/Treatment    Comment (Bed Mobility) UIC  -KR     Row Name 10/03/18 1336          Transfer Assessment/Treatment    Transfer Assessment/Treatment sit-stand transfer;stand-sit transfer  -KR     Comment (Transfers) VC's for sequencing and hand placement.   -KR     Sit-Stand [place] (Transfers) contact guard;verbal cues  -KR     Stand-Sit [place] (Transfers) contact guard;verbal cues  -KR     Row Name 10/03/18 1336          Gait/Stairs Assessment/Training    Gait/Stairs Assessment/Training gait/ambulation independence  -KR     [place] Level (Gait) contact guard;1 person assist;1 person to manage equipment;verbal cues  -KR     Assistive Device (Gait) other (see comments)   B UE support on tele monitor and IV pole  -KR     Distance in Feet (Gait) 70  -KR     Pattern (Gait) step-through  -KR     Deviations/Abnormal Patterns (Gait) base of support, narrow;serina decreased;other (see comments)   decreased step length  -KR     Bilateral Gait Deviations heel strike decreased  -KR     Comment (Gait/Stairs) Pt demonstrated step through gait pattern with slow serina. Pt required two standing rest breaks due to increased SOA. Pt declined use of RW, but used tele monitor and IV pole for UE support. Pt limited by SOA and increased pain.   -KR     Row Name 10/03/18 1336          General ROM    GENERAL ROM COMMENTS BLE WFL   -KR     Row Name 10/03/18 1336          MMT (Manual Muscle Testing)     General MMT Comments BLE grossly 4/5  -KR     Row Name 10/03/18 1336          Motor Assessment/Intervention    Additional Documentation Balance (Group)  -KR     Row Name 10/03/18 1336          Balance    Balance static sitting balance;static standing balance  -KR     Row Name 10/03/18 1336          Static Sitting Balance    Level of [place] (Unsupported Sitting, Static Balance) supervision  -KR     Sitting Position (Unsupported Sitting, Static Balance) sitting in chair  -KR     Row Name 10/03/18 1336          Static Standing Balance    Level of [place] (Supported Standing, Static Balance) contact guard assist  -KR     Row Name 10/03/18 1336          Sensory Assessment/Intervention    Sensory General Assessment no sensation deficits identified  -KR     Row Name 10/03/18 1336          Pain Assessment    Additional Documentation Pain Scale: Numbers Pre/Post-Treatment (Group)  -KR     Row Name 10/03/18 1336          Pain Scale: Numbers Pre/Post-Treatment    Pain Scale: Numbers, Pretreatment 0/10 - no pain  -     Pain Scale: Numbers, Post-Treatment 5/10  -KR     Pain Location - Orientation incisional  -KR     Pain Location chest  -KR     Pain Intervention(s) Repositioned;Ambulation/increased activity  -KR     Row Name             Wound 10/02/18 1006 chest incision    Wound - Properties Group Date first assessed: 10/02/18  -SH Time first assessed: 1006  -SH Location: chest  -SH Type: incision  -SH    Row Name             Wound 10/02/18 1013 Right leg incision    Wound - Properties Group Date first assessed: 10/02/18  -SH Time first assessed: 1013  -SH Side: Right  -SH Location: leg  -SH Type: incision  -SH    Row Name 10/03/18 1336          Plan of Care Review    Plan of Care Reviewed With patient  -KR     Row Name 10/03/18 1336          Physical Therapy Clinical Impression    Date of Referral to PT 10/02/18  -KR     PT Diagnosis (PT Clinical Impression) impaired functional mobility  -KR     Patient/Family  Goals Statement (PT Clinical Impression) return to PLOF  -KR     Criteria for Skilled Interventions Met (PT Clinical Impression) yes;treatment indicated  -KR     Rehab Potential (PT Clinical Summary) good, to achieve stated therapy goals  -KR     Care Plan Review (PT) evaluation/treatment results reviewed;care plan/treatment goals reviewed;patient/other agree to care plan  -KR     Care Plan Review, Other Participant (PT Clinical Impression) spouse  -KR     Row Name 10/03/18 1336          Vital Signs    Pre Systolic BP Rehab 113  -KR     Pre Treatment Diastolic BP 63  -KR     Post Systolic BP Rehab 118  -KR     Post Treatment Diastolic BP 74  -KR     Pretreatment Heart Rate (beats/min) 90  -KR     Posttreatment Heart Rate (beats/min) 102  -KR     Pre SpO2 (%) 98  -KR     O2 Delivery Pre Treatment supplemental O2  -KR     Post SpO2 (%) 95  -KR     O2 Delivery Post Treatment supplemental O2  -KR     Pre Patient Position Sitting  -KR     Intra Patient Position Standing  -KR     Post Patient Position Sitting  -KR     Row Name 10/03/18 1336          Physical Therapy Goals    Bed Mobility Goal Selection (PT) bed mobility, PT goal 1  -KR     Transfer Goal Selection (PT) transfer, PT goal 1  -KR     Gait Training Goal Selection (PT) gait training, PT goal 1  -KR     Stairs Goal Selection (PT) stairs, PT goal 1  -KR     Additional Documentation Stairs Goal Selection (PT) (Row)  -KR     Row Name 10/03/18 7437          Bed Mobility Goal 1 (PT)    Activity/Assistive Device (Bed Mobility Goal 1, PT) bed mobility activities, all  -KR     [place] Level/Cues Needed (Bed Mobility Goal 1, PT) independent  -KR     Time Frame (Bed Mobility Goal 1, PT) 2 weeks  -KR     Progress/Outcomes (Bed Mobility Goal 1, PT) goal ongoing  -KR     Row Name 10/03/18 1335          Transfer Goal 1 (PT)    Activity/Assistive Device (Transfer Goal 1, PT) sit-to-stand/stand-to-sit;bed-to-chair/chair-to-bed  -KR     [place] Level/Cues Needed  (Transfer Goal 1, PT) independent  -KR     Time Frame (Transfer Goal 1, PT) 2 weeks  -KR     Progress/Outcome (Transfer Goal 1, PT) goal ongoing  -KR     Row Name 10/03/18 1336          Gait Training Goal 1 (PT)    Activity/Assistive Device (Gait Training Goal 1, PT) gait (walking locomotion)  -KR     [place] Level (Gait Training Goal 1, PT) independent  -KR     Distance (Gait Goal 1, PT) 400 feet  -KR     Time Frame (Gait Training Goal 1, PT) 2 weeks  -KR     Progress/Outcome (Gait Training Goal 1, PT) goal ongoing  -KR     Row Name 10/03/18 1336          Stairs Goal 1 (PT)    Activity/Assistive Device (Stairs Goal 1, PT) ascending stairs;descending stairs;using handrail, right;using handrail, left  -KR     [place] Level/Cues Needed (Stairs Goal 1, PT) supervision required  -KR     Number of Stairs (Stairs Goal 1, PT) 13  -KR     Time Frame (Stairs Goal 1, PT) 2 weeks  -KR     Progress/Outcome (Stairs Goal 1, PT) goal ongoing  -KR     Row Name 10/03/18 1336          Positioning and Restraints    Pre-Treatment Position sitting in chair/recliner  -KR     Post Treatment Position chair  -KR     In Chair notified nsg;reclined;call light within reach;encouraged to call for assist;with family/caregiver;RUE elevated;LUE elevated;legs elevated  -KR     Row Name 10/03/18 1336          Living Environment    Home Accessibility stairs to enter home;stairs within home;tub/shower is not walk in  -KR       User Key  (r) = Recorded By, (t) = Taken By, (c) = Cosigned By    Initials Name Provider Type     Haase, Sherri L, RN Registered Nurse    Ladi Peña, PT Physical Therapist          Physical Therapy Education     Title: PT OT SLP Therapies (Active)     Topic: Physical Therapy (Active)     Point: Mobility training (Active)    Learning Progress Summary     Learner Status Readiness Method Response Comment Documented by    Patient Active Acceptance E NR  KR 10/03/18 1336    Significant Other Active Acceptance E NR   KR 10/03/18 1336          Point: Body mechanics (Active)    Learning Progress Summary     Learner Status Readiness Method Response Comment Documented by    Patient Active Acceptance E NR  KR 10/03/18 1336    Significant Other Active Acceptance E NR  KR 10/03/18 1336          Point: Precautions (Active)    Learning Progress Summary     Learner Status Readiness Method Response Comment Documented by    Patient Active Acceptance E NR  KR 10/03/18 1336    Significant Other Active Acceptance E NR  KR 10/03/18 1336                      User Key     Initials Effective Dates Name Provider Type Discipline    KR 04/03/18 -  Ladi Garcia, PT Physical Therapist PT                PT Recommendation and Plan  Anticipated Discharge Disposition (PT): home with assist  Planned Therapy Interventions (PT Eval): balance training, bed mobility training, gait training, stair training, strengthening, transfer training  Therapy Frequency (PT Clinical Impression): daily  Outcome Summary/Treatment Plan (PT)  Anticipated Discharge Disposition (PT): home with assist  Plan of Care Reviewed With: patient  Outcome Summary: PT initial evaluation completed for pt s/p CABG x5 presenting with increased SOA, pain, and decreased functional mobility. Pt ambulated 70ft with CGA 1+1. Pt's decreased independence warrants PT skilled care. Recommend D/C home with assistance.           Outcome Measures     Row Name 10/03/18 1336             How much help from another person do you currently need...    Turning from your back to your side while in flat bed without using bedrails? 3  -KR      Moving from lying on back to sitting on the side of a flat bed without bedrails? 2  -KR      Moving to and from a bed to a chair (including a wheelchair)? 3  -KR      Standing up from a chair using your arms (e.g., wheelchair, bedside chair)? 3  -KR      Climbing 3-5 steps with a railing? 2  -KR      To walk in hospital room? 3  -KR      AM-PAC 6 Clicks Score 16  -KR          Functional Assessment    Outcome Measure Options AM-PAC 6 Clicks Basic Mobility (PT)  -KR        User Key  (r) = Recorded By, (t) = Taken By, (c) = Cosigned By    Initials Name Provider Type    Ladi Peña PT Physical Therapist           Time Calculation:         PT Charges     Row Name 10/03/18 1336             Time Calculation    Start Time 1336  -KR      PT Received On 10/03/18  -KR      PT Goal Re-Cert Due Date 10/13/18  -RAJI        User Key  (r) = Recorded By, (t) = Taken By, (c) = Cosigned By    Initials Name Provider Type    Ladi Peña PT Physical Therapist        Therapy Suggested Charges     Code   Minutes Charges    None           Therapy Charges for Today     Code Description Service Date Service Provider Modifiers Qty    [id number]  PT EVAL MOD COMPLEXITY 4 10/3/2018 Ladi Garcia, PT GP 1    [id number]  PT THER SUPP EA 15 MIN 10/3/2018 Ladi Garcia PT GP 2          PT G-Codes  Outcome Measure Options: AM-PAC 6 Clicks Basic Mobility (PT)  AM-PAC 6 Clicks Score: 16      Destiny Garcia PT  10/3/2018

## 2018-10-03 NOTE — PLAN OF CARE
Problem: Patient Care Overview  Goal: Plan of Care Review  Outcome: Ongoing (interventions implemented as appropriate)   10/03/18 1951   Coping/Psychosocial   Plan of Care Reviewed With patient   Plan of Care Review   Progress improving   OTHER   Outcome Summary Pt KIKO, swan, A-line, FC, wilian'd. Ambulated in hallway twice. Cont care in ICU.    Coping/Psychosocial   Patient Agreement with Plan of Care agrees       Problem: Cardiac Surgery (Adult)  Goal: Signs and Symptoms of Listed Potential Problems Will be Absent, Minimized or Managed (Cardiac Surgery)  Outcome: Ongoing (interventions implemented as appropriate)   10/03/18 1951   Goal/Outcome Evaluation   Problems Assessed (Cardiac Surgery) all   Problems Present (Cardiac Surgery) pain;situational response       Problem: Fall Risk (Adult)  Goal: Identify Related Risk Factors and Signs and Symptoms  Outcome: Ongoing (interventions implemented as appropriate)   10/03/18 1951   Fall Risk (Adult)   Related Risk Factors (Fall Risk) fatigue/slow reaction;environment unfamiliar   Signs and Symptoms (Fall Risk) presence of risk factors       Problem: Skin Injury Risk (Adult)  Goal: Identify Related Risk Factors and Signs and Symptoms  Outcome: Ongoing (interventions implemented as appropriate)   10/03/18 1951   Skin Injury Risk (Adult)   Related Risk Factors (Skin Injury Risk) critical care admission;mobility impaired

## 2018-10-04 ENCOUNTER — TRANSCRIBE ORDERS (OUTPATIENT)
Dept: CARDIAC REHAB | Facility: HOSPITAL | Age: 63
End: 2018-10-04

## 2018-10-04 ENCOUNTER — APPOINTMENT (OUTPATIENT)
Dept: GENERAL RADIOLOGY | Facility: HOSPITAL | Age: 63
End: 2018-10-04

## 2018-10-04 DIAGNOSIS — Z95.1 S/P CABG (CORONARY ARTERY BYPASS GRAFT): Primary | ICD-10-CM

## 2018-10-04 LAB
25(OH)D3 SERPL-MCNC: 17.3 NG/ML
ABO + RH BLD: NORMAL
ALBUMIN SERPL-MCNC: 4.04 G/DL (ref 3.2–4.8)
ALBUMIN/GLOB SERPL: 2.1 G/DL (ref 1.5–2.5)
ALP SERPL-CCNC: 59 U/L (ref 25–100)
ALT SERPL W P-5'-P-CCNC: 723 U/L (ref 7–40)
ANION GAP SERPL CALCULATED.3IONS-SCNC: 8 MMOL/L (ref 3–11)
AST SERPL-CCNC: 388 U/L (ref 0–33)
BASOPHILS # BLD AUTO: 0.02 10*3/MM3 (ref 0–0.2)
BASOPHILS NFR BLD AUTO: 0.2 % (ref 0–1)
BH BB BLOOD EXPIRATION DATE: NORMAL
BH BB BLOOD TYPE BARCODE: 5100
BH BB DISPENSE STATUS: NORMAL
BH BB PRODUCT CODE: NORMAL
BH BB UNIT NUMBER: NORMAL
BILIRUB SERPL-MCNC: 5.2 MG/DL (ref 0.3–1.2)
BUN BLD-MCNC: 18 MG/DL (ref 9–23)
BUN/CREAT SERPL: 17.3 (ref 7–25)
CALCIUM SPEC-SCNC: 9 MG/DL (ref 8.7–10.4)
CHLORIDE SERPL-SCNC: 104 MMOL/L (ref 99–109)
CO2 SERPL-SCNC: 27 MMOL/L (ref 20–31)
CREAT BLD-MCNC: 1.04 MG/DL (ref 0.6–1.3)
CROSSMATCH INTERPRETATION: NORMAL
DEPRECATED RDW RBC AUTO: 78 FL (ref 37–54)
DEPRECATED RDW RBC AUTO: 78.2 FL (ref 37–54)
EOSINOPHIL # BLD AUTO: 0.06 10*3/MM3 (ref 0–0.3)
EOSINOPHIL NFR BLD AUTO: 0.6 % (ref 0–3)
ERYTHROCYTE [DISTWIDTH] IN BLOOD BY AUTOMATED COUNT: 22 % (ref 11.3–14.5)
ERYTHROCYTE [DISTWIDTH] IN BLOOD BY AUTOMATED COUNT: 22.3 % (ref 11.3–14.5)
GFR SERPL CREATININE-BSD FRML MDRD: 88 ML/MIN/1.73
GLOBULIN UR ELPH-MCNC: 2 GM/DL
GLUCOSE BLD-MCNC: 138 MG/DL (ref 70–100)
GLUCOSE BLDC GLUCOMTR-MCNC: 133 MG/DL (ref 70–130)
GLUCOSE BLDC GLUCOMTR-MCNC: 153 MG/DL (ref 70–130)
GLUCOSE BLDC GLUCOMTR-MCNC: 165 MG/DL (ref 70–130)
GLUCOSE BLDC GLUCOMTR-MCNC: 174 MG/DL (ref 70–130)
HCT VFR BLD AUTO: 28.3 % (ref 38.9–50.9)
HCT VFR BLD AUTO: 28.5 % (ref 38.9–50.9)
HGB BLD-MCNC: 9.4 G/DL (ref 13.1–17.5)
HGB BLD-MCNC: 9.4 G/DL (ref 13.1–17.5)
IMM GRANULOCYTES # BLD: 0.02 10*3/MM3 (ref 0–0.03)
IMM GRANULOCYTES NFR BLD: 0.2 % (ref 0–0.6)
INR PPP: 1.3 (ref 0.91–1.09)
INR PPP: 1.33 (ref 0.91–1.09)
LYMPHOCYTES # BLD AUTO: 0.78 10*3/MM3 (ref 0.6–4.8)
LYMPHOCYTES NFR BLD AUTO: 7.5 % (ref 24–44)
MAGNESIUM SERPL-MCNC: 2.2 MG/DL (ref 1.3–2.7)
MCH RBC QN AUTO: 31.9 PG (ref 27–31)
MCH RBC QN AUTO: 32.1 PG (ref 27–31)
MCHC RBC AUTO-ENTMCNC: 33 G/DL (ref 32–36)
MCHC RBC AUTO-ENTMCNC: 33.2 G/DL (ref 32–36)
MCV RBC AUTO: 96.6 FL (ref 80–99)
MCV RBC AUTO: 96.6 FL (ref 80–99)
MONOCYTES # BLD AUTO: 0.6 10*3/MM3 (ref 0–1)
MONOCYTES NFR BLD AUTO: 5.8 % (ref 0–12)
NEUTROPHILS # BLD AUTO: 8.97 10*3/MM3 (ref 1.5–8.3)
NEUTROPHILS NFR BLD AUTO: 85.9 % (ref 41–71)
PHOSPHATE SERPL-MCNC: 3.1 MG/DL (ref 2.4–5.1)
PLATELET # BLD AUTO: 143 10*3/MM3 (ref 150–450)
PLATELET # BLD AUTO: 145 10*3/MM3 (ref 150–450)
PMV BLD AUTO: 10.5 FL (ref 6–12)
PMV BLD AUTO: 11.1 FL (ref 6–12)
POTASSIUM BLD-SCNC: 5.3 MMOL/L (ref 3.5–5.5)
PROT SERPL-MCNC: 6 G/DL (ref 5.7–8.2)
PROTHROMBIN TIME: 13.6 SECONDS (ref 9.6–11.5)
PROTHROMBIN TIME: 14 SECONDS (ref 9.6–11.5)
RBC # BLD AUTO: 2.93 10*6/MM3 (ref 4.2–5.76)
RBC # BLD AUTO: 2.95 10*6/MM3 (ref 4.2–5.76)
SODIUM BLD-SCNC: 139 MMOL/L (ref 132–146)
UNIT  ABO: NORMAL
UNIT  RH: NORMAL
WBC NRBC COR # BLD: 10.43 10*3/MM3 (ref 3.5–10.8)
WBC NRBC COR # BLD: 9.7 10*3/MM3 (ref 3.5–10.8)

## 2018-10-04 PROCEDURE — 99024 POSTOP FOLLOW-UP VISIT: CPT | Performed by: THORACIC SURGERY (CARDIOTHORACIC VASCULAR SURGERY)

## 2018-10-04 PROCEDURE — 25010000002 MORPHINE SULFATE (PF) 2 MG/ML SOLUTION: Performed by: THORACIC SURGERY (CARDIOTHORACIC VASCULAR SURGERY)

## 2018-10-04 PROCEDURE — 63710000001 INSULIN LISPRO (HUMAN) PER 5 UNITS: Performed by: INTERNAL MEDICINE

## 2018-10-04 PROCEDURE — 82306 VITAMIN D 25 HYDROXY: CPT | Performed by: INTERNAL MEDICINE

## 2018-10-04 PROCEDURE — 97530 THERAPEUTIC ACTIVITIES: CPT

## 2018-10-04 PROCEDURE — 25010000002 CEFUROXIME: Performed by: PHYSICIAN ASSISTANT

## 2018-10-04 PROCEDURE — 85025 COMPLETE CBC W/AUTO DIFF WBC: CPT | Performed by: INTERNAL MEDICINE

## 2018-10-04 PROCEDURE — 25010000002 FUROSEMIDE PER 20 MG: Performed by: INTERNAL MEDICINE

## 2018-10-04 PROCEDURE — 80053 COMPREHEN METABOLIC PANEL: CPT | Performed by: INTERNAL MEDICINE

## 2018-10-04 PROCEDURE — 99232 SBSQ HOSP IP/OBS MODERATE 35: CPT | Performed by: INTERNAL MEDICINE

## 2018-10-04 PROCEDURE — 71045 X-RAY EXAM CHEST 1 VIEW: CPT

## 2018-10-04 PROCEDURE — 84100 ASSAY OF PHOSPHORUS: CPT | Performed by: INTERNAL MEDICINE

## 2018-10-04 PROCEDURE — 93005 ELECTROCARDIOGRAM TRACING: CPT | Performed by: PHYSICIAN ASSISTANT

## 2018-10-04 PROCEDURE — 94799 UNLISTED PULMONARY SVC/PX: CPT

## 2018-10-04 PROCEDURE — 25010000002 CYANOCOBALAMIN PER 1000 MCG: Performed by: INTERNAL MEDICINE

## 2018-10-04 PROCEDURE — 99233 SBSQ HOSP IP/OBS HIGH 50: CPT | Performed by: INTERNAL MEDICINE

## 2018-10-04 PROCEDURE — 83735 ASSAY OF MAGNESIUM: CPT | Performed by: INTERNAL MEDICINE

## 2018-10-04 PROCEDURE — 82962 GLUCOSE BLOOD TEST: CPT

## 2018-10-04 PROCEDURE — 25010000002 NA FERRIC GLUC CPLX PER 12.5 MG: Performed by: INTERNAL MEDICINE

## 2018-10-04 PROCEDURE — 93010 ELECTROCARDIOGRAM REPORT: CPT | Performed by: INTERNAL MEDICINE

## 2018-10-04 PROCEDURE — 85610 PROTHROMBIN TIME: CPT | Performed by: INTERNAL MEDICINE

## 2018-10-04 RX ORDER — CYANOCOBALAMIN 1000 UG/ML
1000 INJECTION, SOLUTION INTRAMUSCULAR; SUBCUTANEOUS WEEKLY
Status: DISCONTINUED | OUTPATIENT
Start: 2018-10-04 | End: 2018-10-09 | Stop reason: HOSPADM

## 2018-10-04 RX ORDER — POTASSIUM CHLORIDE 1.5 G/1.77G
40 POWDER, FOR SOLUTION ORAL ONCE
Status: COMPLETED | OUTPATIENT
Start: 2018-10-04 | End: 2018-10-04

## 2018-10-04 RX ORDER — FUROSEMIDE 10 MG/ML
40 INJECTION INTRAMUSCULAR; INTRAVENOUS ONCE
Status: COMPLETED | OUTPATIENT
Start: 2018-10-04 | End: 2018-10-04

## 2018-10-04 RX ADMIN — OXYCODONE HYDROCHLORIDE AND ACETAMINOPHEN 2 TABLET: 5; 325 TABLET ORAL at 20:08

## 2018-10-04 RX ADMIN — MORPHINE SULFATE 2 MG: 2 INJECTION, SOLUTION INTRAMUSCULAR; INTRAVENOUS at 04:46

## 2018-10-04 RX ADMIN — OXYCODONE HYDROCHLORIDE AND ACETAMINOPHEN 2 TABLET: 5; 325 TABLET ORAL at 02:12

## 2018-10-04 RX ADMIN — DOCUSATE SODIUM AND SENNOSIDES 2 TABLET: 50; 8.6 TABLET ORAL at 20:08

## 2018-10-04 RX ADMIN — DOCUSATE SODIUM AND SENNOSIDES 2 TABLET: 50; 8.6 TABLET ORAL at 08:40

## 2018-10-04 RX ADMIN — INSULIN LISPRO 2 UNITS: 100 INJECTION, SOLUTION INTRAVENOUS; SUBCUTANEOUS at 20:45

## 2018-10-04 RX ADMIN — OXYCODONE HYDROCHLORIDE AND ACETAMINOPHEN 2 TABLET: 5; 325 TABLET ORAL at 16:18

## 2018-10-04 RX ADMIN — DOCUSATE SODIUM 100 MG: 100 CAPSULE, LIQUID FILLED ORAL at 08:40

## 2018-10-04 RX ADMIN — ATORVASTATIN CALCIUM 40 MG: 40 TABLET, FILM COATED ORAL at 20:08

## 2018-10-04 RX ADMIN — ASPIRIN 325 MG: 325 TABLET, DELAYED RELEASE ORAL at 08:40

## 2018-10-04 RX ADMIN — INSULIN LISPRO 2 UNITS: 100 INJECTION, SOLUTION INTRAVENOUS; SUBCUTANEOUS at 11:36

## 2018-10-04 RX ADMIN — OXYCODONE HYDROCHLORIDE AND ACETAMINOPHEN 2 TABLET: 5; 325 TABLET ORAL at 10:08

## 2018-10-04 RX ADMIN — INSULIN LISPRO 2 UNITS: 100 INJECTION, SOLUTION INTRAVENOUS; SUBCUTANEOUS at 17:34

## 2018-10-04 RX ADMIN — METOPROLOL TARTRATE 12.5 MG: 25 TABLET, FILM COATED ORAL at 12:00

## 2018-10-04 RX ADMIN — CEFUROXIME 1.5 G: 1.5 INJECTION, POWDER, FOR SOLUTION INTRAVENOUS at 05:00

## 2018-10-04 RX ADMIN — CYANOCOBALAMIN 1000 MCG: 1000 INJECTION, SOLUTION INTRAMUSCULAR at 12:00

## 2018-10-04 RX ADMIN — FUROSEMIDE 40 MG: 10 INJECTION, SOLUTION INTRAMUSCULAR; INTRAVENOUS at 09:35

## 2018-10-04 RX ADMIN — SODIUM CHLORIDE 250 MG: 9 INJECTION, SOLUTION INTRAVENOUS at 10:08

## 2018-10-04 RX ADMIN — METOPROLOL TARTRATE 12.5 MG: 25 TABLET, FILM COATED ORAL at 21:00

## 2018-10-04 RX ADMIN — POTASSIUM CHLORIDE 40 MEQ: 1.5 POWDER, FOR SOLUTION ORAL at 09:35

## 2018-10-04 NOTE — PROGRESS NOTES
CTS Progress Note      POD 2 s/p CABGX5       LOS: 3 days   Patient Care Team:  Justin Casas APRN as PCP - General (Family Medicine)    Subjective  On levo at 0.14m/k/m for support  Awake, alert , cooperative  Up in chair     CC: S/P CABGX5    Objective    Vital Signs  Temp:  [96.5 °F (35.8 °C)-100.8 °F (38.2 °C)] 97.9 °F (36.6 °C)  Heart Rate:  [] 91  Resp:  [16-20] 18  BP: ()/(56-90) 97/60  Arterial Line BP: (104-130)/(46-69) 112/56    Physical Exam:   General Appearance: alert, appears stated age and cooperative   Lungs: clear to auscultation, respirations regular, respirations even and respirations unlabored   Heart: regular rhythm & normal rate, normal S1, S2, no murmur, no gallop, no rub and no click   Skin:  Incision c/d/i     Results     Results from last 7 days  Lab Units 10/04/18  0408   WBC 10*3/mm3 10.43   HEMOGLOBIN g/dL 9.4*   HEMATOCRIT % 28.3*   PLATELETS 10*3/mm3 145*       Results from last 7 days  Lab Units 10/04/18  0408   SODIUM mmol/L 139   POTASSIUM mmol/L 5.3   CHLORIDE mmol/L 104   CO2 mmol/L 27.0   BUN mg/dL 18   CREATININE mg/dL 1.04   GLUCOSE mg/dL 138*   CALCIUM mg/dL 9.0           Imaging Results (last 24 hours)     Procedure Component Value Units Date/Time    XR Chest 1 View [[id number]] Updated:  10/04/18 0407    XR Chest 1 View [[id number]] Collected:  10/03/18 0820     Updated:  10/03/18 0900    Narrative:       EXAMINATION: XR CHEST 1 VW- 10/03/2018     INDICATION: Post-Op Heart Surgery; I20.0-Unstable angina;  I25.110-Atherosclerotic heart disease of native coronary artery with  unstable angina pectoris      COMPARISON: 10/02/2018     FINDINGS: Support hardware unchanged and in satisfactory positioning.  Cardiac silhouette borderline enlarged with increasing right basilar  opacifications which may represent layering effusion component and/or  worsening airspace disease/atelectasis.           Impression:       Slight increased hazy opacifications occupying the  right  lower lung may represent layering effusion component or worsening  atelectasis versus airspace disease.     D:  10/03/2018  E:  10/03/2018     This report was finalized on 10/3/2018 8:58 AM by Dr. Jay Davenport.             Assessment    Active Problems:    * No active hospital problems. *  CT 460cc out last 24hours  Pain under fair control    Plan   Will D/C CTs and PW, keep here 1 more day    GREGG Gamble  10/04/18  6:43 AM     As above. Stable post operative course to this point. ? Telemetry in AM.  CXR ----> right effusion that may require thoracentesis.  Patient needs work up for pre op anemia. I have reviewed, verified, and confirmed the above history and current status.  I have examined the patient and confirmed the above physical findings.Above plan and treatment regimen discussed in detail with patient.  Options of treatment, attendant risks vs benefits, and my recommendations were discussed and all questions answered.    Shane Bosch MD  CTSurgery  10/04/18   10:54 AM

## 2018-10-04 NOTE — THERAPY TREATMENT NOTE
Acute Care - Physical Therapy Treatment Note  [facility]     Patient Name: Judah Cerna  : 1955  MRN: [id number]  Today's Date: 10/4/2018  Onset of Illness/Injury or Date of Surgery: 18  Date of Referral to PT: 10/02/18  Referring Physician: GREGG Wheeler    Admit Date: 2018    Visit Dx:    ICD-10-CM ICD-9-CM   1. Unstable angina (CMS/[facility]) I20.0 411.1   2. Coronary artery disease involving native coronary artery of native heart with unstable angina pectoris (CMS/[facility]) I25.110 414.01     411.1   3. Impaired functional mobility, balance, gait, and endurance Z74.09 V49.89     Patient Active Problem List   Diagnosis   • CAD (coronary artery disease)   • Hypertension   • Dyslipidemia   • Type 2 diabetes mellitus (CMS/[facility])   • Abnormal EKG       Therapy Treatment          Rehabilitation Treatment Summary     Row Name 10/04/18 0930             Treatment Time/Intention    Discipline physical therapist  -SHENA      Document Type therapy note (daily note)  -SHENA      Subjective Information no complaints  -SHENA      Mode of Treatment physical therapy  -SHENA      Care Plan Review care plan/treatment goals reviewed;risks/benefits reviewed;patient/other agree to care plan  -SHENA      Therapy Frequency (PT Clinical Impression) daily  -SHENA      Patient Effort excellent  -SHENA      Existing Precautions/Restrictions cardiac;oxygen therapy device and L/min;sternal  -SHENA      Recorded by [SHENA] Leslie Posada, PT 10/04/18 1152      Row Name 10/04/18 0930             Vital Signs    Pre Systolic BP Rehab 122  -SHENA      Pre Treatment Diastolic BP 78  -SHENA      Post Systolic BP Rehab 130  -HSENA      Post Treatment Diastolic BP 76  -SHENA      Pretreatment Heart Rate (beats/min) 104  -SHENA      Posttreatment Heart Rate (beats/min) 104  -SHENA      Pre SpO2 (%) 96  -SHENA      O2 Delivery Pre Treatment nasal cannula  -SHENA      Post SpO2 (%) 96  -SHENA      O2 Delivery Post Treatment supplemental O2  -SHENA      Pre Patient Position Sitting  -SHENA       Intra Patient Position Standing  -SHENA      Post Patient Position Sitting  -SHENA      Recorded by [SHENA] Leslie Posada, PT 10/04/18 1152      Row Name 10/04/18 0930             Cognitive Assessment/Intervention- PT/OT    Affect/Mental Status (Cognitive) WFL  -SHENA      Orientation Status (Cognition) oriented x 4  -SHENA      Follows Commands (Cognition) WFL  -SHENA      Cognitive Function (Cognitive) WFL  -SHENA      Personal Safety Interventions gait belt;nonskid shoes/slippers when out of bed  -SHENA      Recorded by [SHENA] Leslie Posada, PT 10/04/18 1152      Row Name 10/04/18 0930             Safety Issues, Functional Mobility    Impairments Affecting Function (Mobility) balance;endurance/activity tolerance;shortness of breath  -SHENA      Recorded by [SHENA] Leslie Posada, PT 10/04/18 1152      Row Name 10/04/18 0930             Bed Mobility Assessment/Treatment    Comment (Bed Mobility) patient is OOB and returns to the chair  -SHENA      Recorded by [SHENA] Leslie Posada, PT 10/04/18 1152      Row Name 10/04/18 0930             Transfer Assessment/Treatment    Transfer Assessment/Treatment sit-stand transfer;stand-sit transfer  -SHENA      Recorded by [SHENA] Leslie Posada, PT 10/04/18 1152      Row Name 10/04/18 0930             Sit-Stand Transfer    Sit-Stand [place] (Transfers) contact guard  -SHENA      Recorded by [SHENA] Leslie Posada, PT 10/04/18 1152      Row Name 10/04/18 0930             Stand-Sit Transfer    Stand-Sit [place] (Transfers) contact guard  -SHENA      Recorded by [SHENA] Leslie Posada, PT 10/04/18 1152      Row Name 10/04/18 0930             Gait/Stairs Assessment/Training    Gait/Stairs Assessment/Training gait/ambulation independence  -SHENA      [place] Level (Gait) contact guard  -SHENA      Distance in Feet (Gait) 300  -SHENA      Pattern (Gait) step-through  -SHENA      Comment (Gait/Stairs) patient able to demonstrate a more normal gait pattern and is able to increase ambulation distance with  stable vitals  -SHENA      Recorded by [SHENA] Leslie Posada, PT 10/04/18 1152      Row Name 10/04/18 0930             Motor Skills Assessment/Interventions    Additional Documentation Therapeutic Exercise Interventions (Group)  -SHENA      Recorded by [SHENA] Leslie Posada, PT 10/04/18 1152      Row Name 10/04/18 0930             Therapeutic Exercise    Therapeutic Exercise seated, lower extremities;seated, upper extremities  -SHENA      Additional Documentation Therapeutic Exercise (Row)  -SHENA      18289 - PT Therapeutic Activity Minutes 23  -SHENA      Recorded by [SHENA] Leslie Posada, PT 10/04/18 1152      Row Name 10/04/18 0930             Upper Extremity Seated Therapeutic Exercise    Performed, Seated Upper Extremity (Therapeutic Exercise) shoulder flexion/extension;shoulder abduction/adduction;elbow flexion/extension;wrist flexion/extension  -SHENA      Exercise Type, Seated Upper Extremity (Therapeutic Exercise) AROM (active range of motion)  -SHENA      Sets/Reps Detail, Seated Upper Extremity (Therapeutic Exercise) 1/10  -SHENA      Recorded by [SHENA] Leslie Posada, PT 10/04/18 1152      Row Name 10/04/18 0930             Lower Extremity Seated Therapeutic Exercise    Performed, Seated Lower Extremity (Therapeutic Exercise) hip flexion/extension;knee flexion/extension;ankle dorsiflexion/plantarflexion  -SHENA      Exercise Type, Seated Lower Extremity (Therapeutic Exercise) AROM (active range of motion)  -SHENA      Sets/Reps Detail, Seated Lower Extremity (Therapeutic Exercise) 1/10  -SHENA      Recorded by [SHENA] Leslie Posada, PT 10/04/18 1152      Row Name 10/04/18 0930             Static Sitting Balance    Level of [place] (Unsupported Sitting, Static Balance) independent  -SHENA      Sitting Position (Unsupported Sitting, Static Balance) sitting in chair  -SHENA      Recorded by [SHENA] eLslie Posada, PT 10/04/18 1152      Row Name 10/04/18 0930             Static Standing Balance    Level of [place] (Supported  Standing, Static Balance) contact guard assist  -SHENA      Recorded by [SHENA] Leslie Posada, PT 10/04/18 1152      Row Name 10/04/18 0930             Positioning and Restraints    Pre-Treatment Position sitting in chair/recliner  -SHENA      Post Treatment Position chair  -SHENA      In Chair notified nsg;reclined;sitting;call light within reach  -SHENA      Recorded by [SHENA] Leslie Posada, PT 10/04/18 1152      Row Name 10/04/18 0930             Pain Scale: Numbers Pre/Post-Treatment    Pain Scale: Numbers, Pretreatment 0/10 - no pain  -SHENA      Pain Scale: Numbers, Post-Treatment 2/10  -SHENA      Pain Location - Orientation incisional  -SHENA      Pain Location chest  -SHENA      Pain Intervention(s) Repositioned;Ambulation/increased activity;Splinting  -SHENA      Recorded by [SHENA] Leslie Posada, PT 10/04/18 1152      Row Name                Wound 10/02/18 1006 chest incision    Wound - Properties Group Date first assessed: 10/02/18 [SH] Time first assessed: 1006 [SH] Location: chest [SH] Type: incision [SH] Recorded by:  [SH] Haase, Sherri L, RN 10/02/18 1006    Row Name                Wound 10/02/18 1013 Right leg incision    Wound - Properties Group Date first assessed: 10/02/18 [SH] Time first assessed: 1013 [SH] Side: Right [SH] Location: leg [SH] Type: incision [SH] Recorded by:  [SH] Haase, Sherri L, RN 10/02/18 1013    Row Name 10/04/18 0930             Coping    Observed Emotional State calm;cooperative  -SHENA      Verbalized Emotional State acceptance  -SHENA      Recorded by [SHENA] Leslie Posada, PT 10/04/18 1152      Row Name 10/04/18 0930             Plan of Care Review    Plan of Care Reviewed With patient  -SHENA      Recorded by [SHENA] Leslie Posada, PT 10/04/18 1152      Row Name 10/04/18 0930             Outcome Summary/Treatment Plan (PT)    Daily Summary of Progress (PT) progress toward functional goals is good  -SHENA      Anticipated Discharge Disposition (PT) home with assist  -SHENA      Recorded by [SHENA]  Leslie Posada FABIAN, PT 10/04/18 1152        User Key  (r) = Recorded By, (t) = Taken By, (c) = Cosigned By    Initials Name Effective Dates Discipline    SHENA Leslie Posada, PT 06/19/15 -  PT    SH Haase, Sherri L, RN 06/16/16 -  Nurse          Wound 10/02/18 1006 chest incision (Active)   Dressing Appearance open to air 10/4/2018 10:00 AM   Closure Liquid skin adhesive 10/4/2018 10:00 AM   Drainage Amount none 10/4/2018 10:00 AM   Care, Wound cleansed with;antimicrobial agent applied 10/3/2018  8:00 PM   Dressing Care, Wound open to air 10/4/2018  8:00 AM       Wound 10/02/18 1013 Right leg incision (Active)   Dressing Appearance open to air 10/4/2018 10:00 AM   Closure Liquid skin adhesive 10/4/2018 10:00 AM   Drainage Amount none 10/4/2018 10:00 AM   Care, Wound cleansed with;antimicrobial agent applied 10/3/2018  8:00 PM   Dressing Care, Wound open to air 10/4/2018  8:00 AM             Physical Therapy Education     Title: PT OT SLP Therapies (Active)     Topic: Physical Therapy (Active)     Point: Mobility training (Active)    Learning Progress Summary     Learner Status Readiness Method Response Comment Documented by    Patient Active Acceptance E NR  SHENA 10/04/18 0930     Active Acceptance E NR  SHENA 10/04/18 0930     Active Acceptance E NR  KR 10/03/18 1336    Significant Other Active Acceptance E NR  KR 10/03/18 1336          Point: Home exercise program (Active)    Learning Progress Summary     Learner Status Readiness Method Response Comment Documented by    Patient Active Acceptance E NR  SHENA 10/04/18 0930     Active Acceptance E NR  SHENA 10/04/18 0930          Point: Body mechanics (Active)    Learning Progress Summary     Learner Status Readiness Method Response Comment Documented by    Patient Active Acceptance E NR  SHENA 10/04/18 0930     Active Acceptance E NR  SHENA 10/04/18 0930     Active Acceptance E NR  KR 10/03/18 1336    Significant Other Active Acceptance E NR  KR 10/03/18 1336          Point:  Precautions (Active)    Learning Progress Summary     Learner Status Readiness Method Response Comment Documented by    Patient Active Acceptance E NR  SHENA 10/04/18 0930     Active Acceptance E NR  SHENA 10/04/18 0930     Active Acceptance E NR  KR 10/03/18 1336    Significant Other Active Acceptance E NR  KR 10/03/18 1336                      User Key     Initials Effective Dates Name Provider Type Discipline    SHENA 06/19/15 -  Leslie Posada, PT Physical Therapist PT    KR 04/03/18 -  Ladi Garcia, PT Physical Therapist PT                    PT Recommendation and Plan  Anticipated Discharge Disposition (PT): home with assist  Therapy Frequency (PT Clinical Impression): daily  Outcome Summary/Treatment Plan (PT)  Daily Summary of Progress (PT): progress toward functional goals is good  Anticipated Discharge Disposition (PT): home with assist  Plan of Care Reviewed With: patient  Outcome Summary: patient is able to increase ambulation to 300 ft making good progress toward goals          Outcome Measures     Row Name 10/04/18 0930 10/03/18 1336          How much help from another person do you currently need...    Turning from your back to your side while in flat bed without using bedrails? 3  -SHENA 3  -KR     Moving from lying on back to sitting on the side of a flat bed without bedrails? 3  -SHENA 2  -KR     Moving to and from a bed to a chair (including a wheelchair)? 3  -SHENA 3  -KR     Standing up from a chair using your arms (e.g., wheelchair, bedside chair)? 3  -SHENA 3  -KR     Climbing 3-5 steps with a railing? 3  -SHENA 2  -KR     To walk in hospital room? 3  -SHENA 3  -KR     AM-PAC 6 Clicks Score 18  -SHENA 16  -KR        Functional Assessment    Outcome Measure Options  -- AM-PAC 6 Clicks Basic Mobility (PT)  -KR       User Key  (r) = Recorded By, (t) = Taken By, (c) = Cosigned By    Initials Name Provider Type    Leslie Valles, PT Physical Therapist    Ladi Peña, PT Physical Therapist           Time  Calculation:         PT Charges     Row Name 10/04/18 0930             Time Calculation    Start Time 0930  -SHENA      PT Received On 10/04/18  -SHENA      PT Goal Re-Cert Due Date 10/13/18  -SHENA         Time Calculation- PT    Total Timed Code Minutes- PT 23 minute(s)  -SHENA         Timed Charges    67469 - PT Therapeutic Activity Minutes 23  -SHENA        User Key  (r) = Recorded By, (t) = Taken By, (c) = Cosigned By    Initials Name Provider Type    eLslie Valles, PT Physical Therapist        Therapy Suggested Charges     Code   Minutes Charges    47942 (CPT®) Hc Pt Neuromusc Re Education Ea 15 Min      95885 (CPT®) Hc Pt Ther Proc Ea 15 Min      13224 (CPT®) Hc Gait Training Ea 15 Min      80797 (CPT®) Hc Pt Therapeutic Act Ea 15 Min 23 2    66801 (CPT®) Hc Pt Manual Therapy Ea 15 Min      10782 (CPT®) Hc Pt Iontophoresis Ea 15 Min      32543 (CPT®) Hc Pt Elec Stim Ea-Per 15 Min      91935 (CPT®) Hc Pt Ultrasound Ea 15 Min      46417 (CPT®) Hc Pt Self Care/Mgmt/Train Ea 15 Min      64173 (CPT®) Hc Pt Prosthetic (S) Train Initial Encounter, Each 15 Min      10557 (CPT®) Hc Pt Orthotic(S)/Prosthetic(S) Encounter, Each 15 Min      81434 (CPT®) Hc Orthotic(S) Mgmt/Train Initial Encounter, Each 15min      Total  23 2        Therapy Charges for Today     Code Description Service Date Service Provider Modifiers Qty    99896802084 HC PT THERAPEUTIC ACT EA 15 MIN 10/4/2018 Leslie Posada PT  2          PT G-Codes  Outcome Measure Options: AM-PAC 6 Clicks Basic Mobility (PT)  AM-PAC 6 Clicks Score: 18    Leslie Posada PT  10/4/2018

## 2018-10-04 NOTE — PROGRESS NOTES
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  Weaned off levo and insulin drip     Anthropometrics     Height: 165.1 cm (65\")  Last filed wt: Weight: 69.9 kg (154 lb) (10/03/18 0600)  Weight Method: Standing scale    BMI: BMI (Calculated): 25.6  Normal: 18.5-24.9kg/m2    Labs reviewed       Results from last 7 days  Lab Units 10/04/18  0408   SODIUM mmol/L 139   POTASSIUM mmol/L 5.3   CHLORIDE mmol/L 104   CO2 mmol/L 27.0   BUN mg/dL 18   CREATININE mg/dL 1.04   CALCIUM mg/dL 9.0   BILIRUBIN mg/dL 5.2*   ALK PHOS U/L 59   ALT (SGPT) U/L 723*   AST (SGOT) U/L 388*   GLUCOSE mg/dL 138*       Results from last 7 days  Lab Units 10/04/18  0716 10/03/18  2019 10/03/18  1617 10/03/18  1108 10/03/18  1000 10/03/18  0911   GLUCOSE mg/dL 133* 166* 112 126 119 117       Lab Results  Lab Value Date/Time   HGBA1C 6.30 (H) 09/29/2018 0600       Medications reviewed   Pertinent:  Reviewed     Current Nutrition Prescription     PO: Diet Regular; Consistent Carbohydrate, Cardiac    Active Supplement Orders      DIET MESSAGE Please bring tray. Thanks    Intake:  isuf data - 100% of clear liquid meal yesterday.  75% of meals prior to surgery     Nutrition Diagnosis   10/4/2018  Problem No nutrition diagnosis at this time   Etiology    Signs/Symptoms      Nutrition Intervention     Interventions Goal    General: Nutrition support treatment   Establish PO Intake     Nutrition Interventions   1.  Follow treatment progress, Care plan reviewed    Monitor/ Evaluation    Per protocol, I&O, PO intake      Will Continue to follow per protocol      Lilliana THOMAS" DENYS Vasquez  Time Spent: 20min

## 2018-10-04 NOTE — PROGRESS NOTES
Continued Stay Note   Heard     Patient Name: Judah Cerna  MRN: [id number]  Today's Date: 10/4/2018    Admit Date: 9/28/2018          Discharge Plan     Row Name 10/04/18 1045       Plan    Plan Home     Patient/Family in Agreement with Plan yes    Plan Comments Patient remains in ICU. Spoke with patient and wife at bedside. Plan unchanged so at time of discharge patient will return home with wife Fe to help as needed. Nisreen @ [phone]               Discharge Codes    No documentation.       Expected Discharge Date and Time     Expected Discharge Date Expected Discharge Time    Oct 5, 2018             Radha Guevara RN

## 2018-10-04 NOTE — PLAN OF CARE
Problem: Patient Care Overview  Goal: Plan of Care Review  Outcome: Ongoing (interventions implemented as appropriate)   10/04/18 1629   Coping/Psychosocial   Plan of Care Reviewed With patient   Plan of Care Review   Progress improving   OTHER   Outcome Summary Pt has improved throughout the day. CT D/C this A.M., and pt was weaned off levophed gtt today; BP is tolerating. Pt has ambulated twice today with plans to ambulate later. No fatigue or SOB reported post ambulation. Pt remains on 2-3 L NC. C/o pain thorughout the day well managed with PRN pain medication. lasix given today as well as Vitamin B IM injections started today then ordered weekly. pt received IV iron today. pt still has not had BM today, but has reported passing gas. no c/o n/v. no other concerns at this time.    Coping/Psychosocial   Patient Agreement with Plan of Care agrees       Problem: Cardiac Surgery (Adult)  Goal: Signs and Symptoms of Listed Potential Problems Will be Absent, Minimized or Managed (Cardiac Surgery)  Outcome: Ongoing (interventions implemented as appropriate)   10/04/18 1629   Goal/Outcome Evaluation   Problems Assessed (Cardiac Surgery) all   Problems Present (Cardiac Surgery) pain     Goal: Anesthesia/Sedation Recovery  Outcome: Ongoing (interventions implemented as appropriate)   10/04/18 1629   Goal/Outcome Evaluation   Anesthesia/Sedation Recovery progressing toward baseline       Problem: Fall Risk (Adult)  Goal: Identify Related Risk Factors and Signs and Symptoms  Outcome: Ongoing (interventions implemented as appropriate)   10/04/18 1629   Fall Risk (Adult)   Related Risk Factors (Fall Risk) fatigue/slow reaction;environment unfamiliar   Signs and Symptoms (Fall Risk) presence of risk factors     Goal: Absence of Fall  Outcome: Ongoing (interventions implemented as appropriate)   10/04/18 1629   Fall Risk (Adult)   Absence of Fall making progress toward outcome       Problem: Skin Injury Risk (Adult)  Goal:  Identify Related Risk Factors and Signs and Symptoms  Outcome: Ongoing (interventions implemented as appropriate)   10/04/18 1629   Skin Injury Risk (Adult)   Related Risk Factors (Skin Injury Risk) critical care admission     Goal: Skin Health and Integrity  Outcome: Ongoing (interventions implemented as appropriate)   10/04/18 1629   Skin Injury Risk (Adult)   Skin Health and Integrity making progress toward outcome

## 2018-10-04 NOTE — PLAN OF CARE
Problem: Patient Care Overview  Goal: Plan of Care Review  Outcome: Ongoing (interventions implemented as appropriate)   10/04/18 0121   Coping/Psychosocial   Plan of Care Reviewed With patient   Plan of Care Review   Progress improving   OTHER   Outcome Summary Patient continues to improve overall. Pain controlled with current meds ordered. Remains on low dose levo to maintain bp. Minimal serosang. drainage from MT sites. Patient weak with ambulation. Needs much encouragement to cough and deep breathe.        Problem: Cardiac Surgery (Adult)  Goal: Signs and Symptoms of Listed Potential Problems Will be Absent, Minimized or Managed (Cardiac Surgery)  Outcome: Ongoing (interventions implemented as appropriate)   10/04/18 0121   Goal/Outcome Evaluation   Problems Assessed (Cardiac Surgery) all   Problems Present (Cardiac Surgery) hemodynamic instability;pain       Problem: Skin Injury Risk (Adult)  Goal: Identify Related Risk Factors and Signs and Symptoms  Outcome: Ongoing (interventions implemented as appropriate)   10/04/18 0121   Skin Injury Risk (Adult)   Related Risk Factors (Skin Injury Risk) critical care admission

## 2018-10-04 NOTE — PLAN OF CARE
Problem: Patient Care Overview  Goal: Plan of Care Review  Outcome: Ongoing (interventions implemented as appropriate)   10/04/18 2964   Coping/Psychosocial   Plan of Care Reviewed With patient   OTHER   Outcome Summary patient is able to increase ambulation to 300 ft making good progress toward goals

## 2018-10-04 NOTE — PROGRESS NOTES
INTENSIVIST / PULMONARY FOLLOW UP NOTE     Hospital:  LOS: 3 days   Mr. Judah Cerna, 62 y.o. male is followed for:   Active Problems:    * No active hospital problems. *  CAD        SUBJECTIVE   Weaned off pressors, improving    The patient's relevant past medical, surgical, family, and social history were reviewed    Allergies and medications were reviewed    ROS:  Per subjective, all other systems were reviewed and were negative        OBJECTIVE     Vital Sign Min/Max for last 24 hours:  Temp  Min: 96.5 °F (35.8 °C)  Max: 99 °F (37.2 °C)   BP  Min: 82/59  Max: 126/90   Pulse  Min: 82  Max: 106   Resp  Min: 16  Max: 20   SpO2  Min: 89 %  Max: 100 %   No Data Recorded     Physical Exam:  General Appearance:  Alert, in no acute distress  Eyes:  No scleral icterus or pallor, pupils normal  Ears, Nose, Mouth, Throat:  Atraumatic, oropharynx clear  Neck:  Trachea midline, thyroid normal  Respiratory:  Clear to auscultation bilaterally, normal effort  Cardiovascular:  Regular rate and rhythm, no murmurs, no peripheral edema  Gastrointestinal:  Soft, non-tender, non-distended, no hepatosplenomegaly  Skin:  Normal temperature, no rash  Psychiatric:  Normal mood and affect, normal judgement and insight  Neuro:  No new focal neurologic deficits observed      Telemetry:                SpO2: 91 % SpO2  Min: 89 %  Max: 100 %   Device:      Flow Rate:   No Data Recorded       Intake/Ouptut 24 hrs (7:00AM - 6:59 AM)  Intake & Output (last 3 days)       10/01 0701 - 10/02 0700 10/02 0701 - 10/03 0700 10/03 0701 - 10/04 0700 10/04 0701 - 10/05 0700    P.O.   970     I.V. (mL/kg)  2105 (30.1) 487.9 (7) 27.2 (0.4)    Blood  2860      Other   80     IV Piggyback  2139 520     Total Intake(mL/kg)  7104 (101.6) 2057.9 (29.4) 27.2 (0.4)    Urine (mL/kg/hr) 650 (0.4) 3019 (1.8) 1150 (0.7) 1000 (3.5)    Chest Tube  1400 460     Total Output 650 4419 1610 1000    Net -650 +2685 +447.9 -972.8            Unmeasured Urine Occurrence    1  x          Lines, Drains & Airways    Active LDAs     Name:   Placement date:   Placement time:   Site:   Days:    Pulmonary Artery Catheter - Triple Lumen 10/02/18  10/02/18    0942      less than 1    CVC Double Lumen 10/02/18  10/02/18    0942        less than 1    Peripheral IV 10/01/18 1557 Left Arm  10/01/18    1557    Arm    less than 1    Chest Tube  10/02/18            less than 1    NG/OG Tube Nasogastric Left nostril  10/02/18    1350    Left nostril    less than 1    Urethral Catheter Temperature probe 16 Fr.  10/02/18          less than 1    Y Chest Tube 1 and 2 32 Fr. 32 Fr.  10/02/18          less than 1    ETT   10/02/18    1053 created via procedure documentation    less than 1    Arterial Line 10/02/18 Right Radial  10/02/18    0834    Radial    less than 1    Pacer Wires  10/02/18    1330    Ventricular    less than 1                Hematology:    Results from last 7 days  Lab Units 10/04/18  0408 10/03/18  2343 10/03/18  1814 10/03/18  1157 10/03/18  0345 10/02/18  2157 10/02/18  2046   WBC 10*3/mm3 10.43 9.70 9.55 9.23 7.43 7.14 6.97   HEMOGLOBIN g/dL 9.4* 9.4* 8.8* 9.1* 8.7* 7.2* 7.5*   HEMATOCRIT % 28.3* 28.5* 26.5* 27.5* 25.8* 21.2* 22.2*   PLATELETS 10*3/mm3 145* 143* 148* 164 185 182 174     Electrolytes, Magnesium and Phosphorus:    Results from last 7 days  Lab Units 10/04/18  0408 10/03/18  0345 10/02/18  2157 10/02/18  1725 10/02/18  1359 10/01/18  0828 09/29/18  0600   SODIUM mmol/L 139 145 144 143 141 137 141   CHLORIDE mmol/L 104 115* 113* 107 111* 100 105   POTASSIUM mmol/L 5.3 4.7 3.9 3.9 3.4* 4.1 4.1   CO2 mmol/L 27.0 25.0 24.0 25.0 24.0 28.0 28.0   MAGNESIUM mg/dL 2.2 2.3 2.4 2.7 3.1*  --   --    PHOSPHORUS mg/dL 3.1 3.7 1.1* 1.2* 2.0*  --   --      Renal:    Results from last 7 days  Lab Units 10/04/18  0408 10/03/18  0345 10/02/18  2157 10/02/18  1725 10/02/18  1359 10/01/18  0828 09/29/18  0600   CREATININE mg/dL 1.04 1.07 1.10 1.11 0.94 0.97 0.85   BUN mg/dL 18 17 16 14 13 13  "14     Estimated Creatinine Clearance: 73.7 mL/min (by C-G formula based on SCr of 1.04 mg/dL).  Hepatic:    Results from last 7 days  Lab Units 10/04/18  0408 10/01/18  0828 09/28/18  1421   ALK PHOS U/L 59 72 67   BILIRUBIN mg/dL 5.2* 1.8* 1.4*   ALT (SGPT) U/L 723* 30 23   AST (SGOT) U/L 388* 33 29     Arterial Blood Gases:    Results from last 7 days  Lab Units 10/03/18  0700 10/03/18  0631 10/02/18  2044 10/02/18  1508 10/02/18  1324 10/02/18  1310 10/02/18  1238   PH, ARTERIAL pH units 7.409 7.411 7.343* 7.481* 7.46 7.49 7.40   PCO2, ARTERIAL mm Hg 37.6 37.2 44.5 32.0*  --   --   --    PO2 ART mm Hg 94.8 97.0 115.0* 480.0*  --   --   --    FIO2 % 30 30 35 100  --   --   --          Results from last 7 days  Lab Units 09/29/18  0600   HEMOGLOBIN A1C % 6.30*       No results found for: LACTATE    Relevant imaging studies and labs from 10/04/18 were reviewed and interpreted by me    Medications (sadaf):    niCARdipine    nitroglycerin    norepinephrine Last Rate: Stopped (10/04/18 0900)   phenylephrine          aspirin 325 mg Oral Daily   atorvastatin 40 mg Oral Nightly   ferric gluconate (FERRLECIT) IVPB 250 mg Intravenous Daily   metoprolol tartrate 12.5 mg Oral Q12H   pharmacy consult - MTM  Does not apply Daily   sennosides-docusate sodium 2 tablet Oral BID       Assessment/Plan   IMPRESSION / PLAN     Inpatient Problem List:  62 y.o.male:       Impression:  62 y.o.male with relevant PMH of CAD 2 stents, recent cath w/ 3vCAD EF 60%, HTN, HLD, NID-T2DM admitted 9/28/2018 w/ chest pain, cath 9/29 w/ 3vCAD, now post op 5vCABG by Dr. Bosch on 10/2.  Had some post op bleeding improved w/ 4 FFP, 2 Platelets, 3 PRBC.    Plan:  Post op care - per cts    DM A1c 6.3 / Stress Hyperglycemia - SQ insulin prn    Hypotension - weaned off pressors today    Hypoxemia - pulmonary toilet, give 1 dose of lasix, may need to tap right effusion if it doesn't \"diurese\" off    Acute Blood Loss Anemia - also macrocytic on admit, Hgb " dropped to 5.3 and transfused. Will give IV Iron x 4 doses.      Elevated LFTs - probably shock liver, monitor    B12 Deficiency - start IM B12, needs to follow this up with his PCP    Check 25-OH vitamin D    Monitor for complications    Resume appropriate home meds    Mechanical DVT prophylaxis    Nutrition - Diet Regular; Consistent Carbohydrate, Cardiac    Plan of care and goals reviewed with mulitdisciplinary team at daily rounds           Rui Velez MD  Intensive Care Medicine  10/04/18 11:05 AM

## 2018-10-04 NOTE — PROGRESS NOTES
"[facility] at [facility]  IP Progress Note      Chief Complaint: Follow-up of CAD, hypertension and dyslipidemia.    Subjective:  Sitting up in chair, feels better, walked.  Mediastinal tubes removed.  Still has soreness in chest but no shortness of breath nausea or vomiting.  Receiving iron infusion.    Objective:  Blood pressure 119/90, pulse 100, temperature 97.9 °F (36.6 °C), temperature source Oral, resp. rate 18, height 165.1 cm (65\"), weight 69.9 kg (154 lb), SpO2 91 %.     Intake/Output Summary (Last 24 hours) at 10/04/18 1057  Last data filed at 10/04/18 1008   Gross per 24 hour   Intake          1755.09 ml   Output             1590 ml   Net           165.09 ml       Physical Exam:  General: No acute distress.   Neck: no JVD.  Chest:No respiratory distress, breath sounds are slightly diminished at bases with scattered rhonchi. .  Cardiovascular: Normal S1 and S2, no murmer, gallop or rub.    Extremities: No edema.    Results Review:     I reviewed the patient's new clinical results.      Results from last 7 days  Lab Units 10/04/18  0408   WBC 10*3/mm3 10.43   HEMOGLOBIN g/dL 9.4*   HEMATOCRIT % 28.3*   PLATELETS 10*3/mm3 145*       Results from last 7 days  Lab Units 10/04/18  0408   SODIUM mmol/L 139   POTASSIUM mmol/L 5.3   CHLORIDE mmol/L 104   CO2 mmol/L 27.0   BUN mg/dL 18   CREATININE mg/dL 1.04   CALCIUM mg/dL 9.0   BILIRUBIN mg/dL 5.2*   ALK PHOS U/L 59   ALT (SGPT) U/L 723*   AST (SGOT) U/L 388*   GLUCOSE mg/dL 138*       Results from last 7 days  Lab Units 10/04/18  0408   SODIUM mmol/L 139   POTASSIUM mmol/L 5.3   CHLORIDE mmol/L 104   CO2 mmol/L 27.0   BUN mg/dL 18   CREATININE mg/dL 1.04   GLUCOSE mg/dL 138*   CALCIUM mg/dL 9.0       Results from last 7 days  Lab Units 10/04/18  0408 10/03/18  2343 10/03/18  1814   INR  1.30* 1.33* 1.31*     Lab Results   Component Value Date    TROPONINI <0.006 09/29/2018       Results from last 7 days  Lab Units 09/29/18  0600   TSH " mIU/mL 1.813       Results from last 7 days  Lab Units 09/29/18  0600   CHOLESTEROL mg/dL 91   TRIGLYCERIDES mg/dL 98   HDL CHOL mg/dL 26*   LDL CHOL mg/dL 44           Tele: Sinus Rythym    Assessment:  CAD, status post CABG ×5, normal EF.  Hypertension, controlled.  Dyslipidemia, on statin therapy.    1. Plan:  2. Add low-dose beta blockers, continue aspirin and statin.  3. Increase activities, transfer to telemetry okay with us.      Kay Stevens MD, FACC, [facility]

## 2018-10-04 NOTE — NURSING NOTE
Pt. Referred for Phase II Cardiac Rehab. Staff discussed benefits of exercise, program protocol, and educational material provided. Teach back verified.  Patient scheduled for orientation at [facility] on November 6th at 9:00.

## 2018-10-05 ENCOUNTER — APPOINTMENT (OUTPATIENT)
Dept: GENERAL RADIOLOGY | Facility: HOSPITAL | Age: 63
End: 2018-10-05

## 2018-10-05 LAB
ALBUMIN SERPL-MCNC: 3.84 G/DL (ref 3.2–4.8)
ALBUMIN/GLOB SERPL: 2 G/DL (ref 1.5–2.5)
ALP SERPL-CCNC: 72 U/L (ref 25–100)
ALT SERPL W P-5'-P-CCNC: 928 U/L (ref 7–40)
ANION GAP SERPL CALCULATED.3IONS-SCNC: 2 MMOL/L (ref 3–11)
AST SERPL-CCNC: 436 U/L (ref 0–33)
BASOPHILS # BLD AUTO: 0.02 10*3/MM3 (ref 0–0.2)
BASOPHILS NFR BLD AUTO: 0.2 % (ref 0–1)
BILIRUB SERPL-MCNC: 3.5 MG/DL (ref 0.3–1.2)
BUN BLD-MCNC: 16 MG/DL (ref 9–23)
BUN/CREAT SERPL: 18 (ref 7–25)
CALCIUM SPEC-SCNC: 9 MG/DL (ref 8.7–10.4)
CHLORIDE SERPL-SCNC: 101 MMOL/L (ref 99–109)
CO2 SERPL-SCNC: 33 MMOL/L (ref 20–31)
CREAT BLD-MCNC: 0.89 MG/DL (ref 0.6–1.3)
DEPRECATED RDW RBC AUTO: 74.4 FL (ref 37–54)
EOSINOPHIL # BLD AUTO: 0.32 10*3/MM3 (ref 0–0.3)
EOSINOPHIL NFR BLD AUTO: 2.6 % (ref 0–3)
ERYTHROCYTE [DISTWIDTH] IN BLOOD BY AUTOMATED COUNT: 21.1 % (ref 11.3–14.5)
GFR SERPL CREATININE-BSD FRML MDRD: 105 ML/MIN/1.73
GLOBULIN UR ELPH-MCNC: 2 GM/DL
GLUCOSE BLD-MCNC: 113 MG/DL (ref 70–100)
GLUCOSE BLDC GLUCOMTR-MCNC: 108 MG/DL (ref 70–130)
GLUCOSE BLDC GLUCOMTR-MCNC: 110 MG/DL (ref 70–130)
GLUCOSE BLDC GLUCOMTR-MCNC: 155 MG/DL (ref 70–130)
GLUCOSE BLDC GLUCOMTR-MCNC: 157 MG/DL (ref 70–130)
HCT VFR BLD AUTO: 27 % (ref 38.9–50.9)
HGB BLD-MCNC: 9.1 G/DL (ref 13.1–17.5)
IMM GRANULOCYTES # BLD: 0.04 10*3/MM3 (ref 0–0.03)
IMM GRANULOCYTES NFR BLD: 0.3 % (ref 0–0.6)
LYMPHOCYTES # BLD AUTO: 0.83 10*3/MM3 (ref 0.6–4.8)
LYMPHOCYTES NFR BLD AUTO: 6.9 % (ref 24–44)
MAGNESIUM SERPL-MCNC: 2 MG/DL (ref 1.3–2.7)
MCH RBC QN AUTO: 32.5 PG (ref 27–31)
MCHC RBC AUTO-ENTMCNC: 33.7 G/DL (ref 32–36)
MCV RBC AUTO: 96.4 FL (ref 80–99)
MONOCYTES # BLD AUTO: 0.81 10*3/MM3 (ref 0–1)
MONOCYTES NFR BLD AUTO: 6.7 % (ref 0–12)
NEUTROPHILS # BLD AUTO: 10.13 10*3/MM3 (ref 1.5–8.3)
NEUTROPHILS NFR BLD AUTO: 83.6 % (ref 41–71)
NRBC BLD MANUAL-RTO: 0 /100 WBC (ref 0–0)
PHOSPHATE SERPL-MCNC: 1.8 MG/DL (ref 2.4–5.1)
PLATELET # BLD AUTO: 152 10*3/MM3 (ref 150–450)
PMV BLD AUTO: 11.2 FL (ref 6–12)
POTASSIUM BLD-SCNC: 4.3 MMOL/L (ref 3.5–5.5)
PROT SERPL-MCNC: 5.8 G/DL (ref 5.7–8.2)
RBC # BLD AUTO: 2.8 10*6/MM3 (ref 4.2–5.76)
SODIUM BLD-SCNC: 136 MMOL/L (ref 132–146)
WBC NRBC COR # BLD: 12.11 10*3/MM3 (ref 3.5–10.8)

## 2018-10-05 PROCEDURE — 99232 SBSQ HOSP IP/OBS MODERATE 35: CPT | Performed by: INTERNAL MEDICINE

## 2018-10-05 PROCEDURE — 84100 ASSAY OF PHOSPHORUS: CPT | Performed by: INTERNAL MEDICINE

## 2018-10-05 PROCEDURE — 82962 GLUCOSE BLOOD TEST: CPT

## 2018-10-05 PROCEDURE — 94799 UNLISTED PULMONARY SVC/PX: CPT

## 2018-10-05 PROCEDURE — 83735 ASSAY OF MAGNESIUM: CPT | Performed by: INTERNAL MEDICINE

## 2018-10-05 PROCEDURE — 80053 COMPREHEN METABOLIC PANEL: CPT | Performed by: INTERNAL MEDICINE

## 2018-10-05 PROCEDURE — 85025 COMPLETE CBC W/AUTO DIFF WBC: CPT | Performed by: INTERNAL MEDICINE

## 2018-10-05 PROCEDURE — 25010000002 FUROSEMIDE PER 20 MG: Performed by: INTERNAL MEDICINE

## 2018-10-05 PROCEDURE — 25010000002 NA FERRIC GLUC CPLX PER 12.5 MG: Performed by: INTERNAL MEDICINE

## 2018-10-05 PROCEDURE — 99233 SBSQ HOSP IP/OBS HIGH 50: CPT | Performed by: INTERNAL MEDICINE

## 2018-10-05 PROCEDURE — 99024 POSTOP FOLLOW-UP VISIT: CPT | Performed by: PHYSICIAN ASSISTANT

## 2018-10-05 PROCEDURE — 71045 X-RAY EXAM CHEST 1 VIEW: CPT

## 2018-10-05 PROCEDURE — 97530 THERAPEUTIC ACTIVITIES: CPT

## 2018-10-05 PROCEDURE — 99024 POSTOP FOLLOW-UP VISIT: CPT | Performed by: THORACIC SURGERY (CARDIOTHORACIC VASCULAR SURGERY)

## 2018-10-05 RX ORDER — SODIUM CHLORIDE 0.9 % (FLUSH) 0.9 %
3-10 SYRINGE (ML) INJECTION EVERY 8 HOURS
Status: DISCONTINUED | OUTPATIENT
Start: 2018-10-05 | End: 2018-10-09

## 2018-10-05 RX ORDER — POTASSIUM CHLORIDE 1.5 G/1.77G
40 POWDER, FOR SOLUTION ORAL ONCE
Status: COMPLETED | OUTPATIENT
Start: 2018-10-05 | End: 2018-10-05

## 2018-10-05 RX ORDER — FUROSEMIDE 10 MG/ML
40 INJECTION INTRAMUSCULAR; INTRAVENOUS ONCE
Status: COMPLETED | OUTPATIENT
Start: 2018-10-05 | End: 2018-10-05

## 2018-10-05 RX ORDER — SODIUM CHLORIDE 0.9 % (FLUSH) 0.9 %
3 SYRINGE (ML) INJECTION EVERY 12 HOURS SCHEDULED
Status: DISCONTINUED | OUTPATIENT
Start: 2018-10-05 | End: 2018-10-09 | Stop reason: HOSPADM

## 2018-10-05 RX ORDER — HEPARIN SODIUM 5000 [USP'U]/ML
5000 INJECTION, SOLUTION INTRAVENOUS; SUBCUTANEOUS EVERY 8 HOURS SCHEDULED
Status: DISCONTINUED | OUTPATIENT
Start: 2018-10-05 | End: 2018-10-05

## 2018-10-05 RX ADMIN — HYDROCODONE BITARTRATE AND ACETAMINOPHEN 1 TABLET: 7.5; 325 TABLET ORAL at 18:17

## 2018-10-05 RX ADMIN — METOPROLOL TARTRATE 25 MG: 25 TABLET ORAL at 08:47

## 2018-10-05 RX ADMIN — SODIUM CHLORIDE 250 MG: 9 INJECTION, SOLUTION INTRAVENOUS at 10:43

## 2018-10-05 RX ADMIN — HYDROCODONE BITARTRATE AND ACETAMINOPHEN 1 TABLET: 7.5; 325 TABLET ORAL at 08:47

## 2018-10-05 RX ADMIN — INSULIN LISPRO 2 UNITS: 100 INJECTION, SOLUTION INTRAVENOUS; SUBCUTANEOUS at 12:00

## 2018-10-05 RX ADMIN — POTASSIUM & SODIUM PHOSPHATES POWDER PACK 280-160-250 MG 2 PACKET: 280-160-250 PACK at 10:43

## 2018-10-05 RX ADMIN — OXYCODONE HYDROCHLORIDE AND ACETAMINOPHEN 2 TABLET: 5; 325 TABLET ORAL at 00:25

## 2018-10-05 RX ADMIN — POTASSIUM CHLORIDE 40 MEQ: 1.5 POWDER, FOR SOLUTION ORAL at 12:03

## 2018-10-05 RX ADMIN — OXYCODONE HYDROCHLORIDE AND ACETAMINOPHEN 2 TABLET: 5; 325 TABLET ORAL at 04:08

## 2018-10-05 RX ADMIN — ASPIRIN 325 MG: 325 TABLET, DELAYED RELEASE ORAL at 08:47

## 2018-10-05 RX ADMIN — HYDROCODONE BITARTRATE AND ACETAMINOPHEN 1 TABLET: 7.5; 325 TABLET ORAL at 23:20

## 2018-10-05 RX ADMIN — METOPROLOL TARTRATE 25 MG: 25 TABLET ORAL at 20:29

## 2018-10-05 RX ADMIN — DOCUSATE SODIUM AND SENNOSIDES 2 TABLET: 50; 8.6 TABLET ORAL at 08:47

## 2018-10-05 RX ADMIN — OXYCODONE HYDROCHLORIDE AND ACETAMINOPHEN 2 TABLET: 5; 325 TABLET ORAL at 13:32

## 2018-10-05 RX ADMIN — INSULIN LISPRO 2 UNITS: 100 INJECTION, SOLUTION INTRAVENOUS; SUBCUTANEOUS at 16:52

## 2018-10-05 RX ADMIN — FUROSEMIDE 40 MG: 10 INJECTION, SOLUTION INTRAMUSCULAR; INTRAVENOUS at 12:02

## 2018-10-05 RX ADMIN — DOCUSATE SODIUM AND SENNOSIDES 2 TABLET: 50; 8.6 TABLET ORAL at 20:29

## 2018-10-05 RX ADMIN — CHOLECALCIFEROL CAP 1.25 MG (50000 UNIT) 50000 UNITS: 1.25 CAP at 12:01

## 2018-10-05 NOTE — PROGRESS NOTES
INTENSIVIST / PULMONARY FOLLOW UP NOTE     Hospital:  LOS: 4 days   Mr. Judah Cerna, 62 y.o. male is followed for:   Active Problems:    * No active hospital problems. *  CAD        SUBJECTIVE   Walking stairs w/ PT, doing great    The patient's relevant past medical, surgical, family, and social history were reviewed    Allergies and medications were reviewed    ROS:  Per subjective, all other systems were reviewed and were negative        OBJECTIVE     Vital Sign Min/Max for last 24 hours:  Temp  Min: 98.1 °F (36.7 °C)  Max: 98.4 °F (36.9 °C)   BP  Min: 91/63  Max: 132/85   Pulse  Min: 91  Max: 110   Resp  Min: 16  Max: 20   SpO2  Min: 94 %  Max: 100 %   No Data Recorded     Physical Exam:  General Appearance:  Alert, in no acute distress  Eyes:  No scleral icterus or pallor, pupils normal  Ears, Nose, Mouth, Throat:  Atraumatic, oropharynx clear  Neck:  Trachea midline, thyroid normal  Respiratory:  Clear to auscultation bilaterally, normal effort  Cardiovascular:  Regular rate and rhythm, no murmurs, no peripheral edema  Gastrointestinal:  Soft, non-tender, non-distended, no hepatosplenomegaly  Skin:  Normal temperature, no rash  Psychiatric:  Normal mood and affect, normal judgement and insight  Neuro:  No new focal neurologic deficits observed      Telemetry:                SpO2: 95 % SpO2  Min: 94 %  Max: 100 %   Device:      Flow Rate:   No Data Recorded       Intake/Ouptut 24 hrs (7:00AM - 6:59 AM)  Intake & Output (last 3 days)       10/02 0701 - 10/03 0700 10/03 0701 - 10/04 0700 10/04 0701 - 10/05 0700 10/05 0701 - 10/06 0700    P.O.  970 480 360    I.V. (mL/kg) 2105 (30.1) 487.9 (7) 27.2 (0.4)     Blood 2860       Other  80      IV Piggyback 2139 520      Total Intake(mL/kg) 7104 (101.6) 2057.9 (29.4) 507.2 (7.3) 360 (5.2)    Urine (mL/kg/hr) 3019 (1.8) 1150 (0.7) 2650 (1.6)     Chest Tube 1400 460      Total Output 4419 1610 2650      Net +2685 +447.9 -2142.8 +360            Unmeasured Urine  Occurrence   1 x           Lines, Drains & Airways    Active LDAs     Name:   Placement date:   Placement time:   Site:   Days:    Pulmonary Artery Catheter - Triple Lumen 10/02/18  10/02/18    0942      less than 1    CVC Double Lumen 10/02/18  10/02/18    0942        less than 1    Peripheral IV 10/01/18 1557 Left Arm  10/01/18    1557    Arm    less than 1    Chest Tube  10/02/18            less than 1    NG/OG Tube Nasogastric Left nostril  10/02/18    1350    Left nostril    less than 1    Urethral Catheter Temperature probe 16 Fr.  10/02/18          less than 1    Y Chest Tube 1 and 2 32 Fr. 32 Fr.  10/02/18          less than 1    ETT   10/02/18    1053 created via procedure documentation    less than 1    Arterial Line 10/02/18 Right Radial  10/02/18    0834    Radial    less than 1    Pacer Wires  10/02/18    1330    Ventricular    less than 1                Hematology:    Results from last 7 days  Lab Units 10/05/18  0357 10/04/18  0408 10/03/18  2343 10/03/18  1814 10/03/18  1157 10/03/18  0345 10/02/18  2157   WBC 10*3/mm3 12.11* 10.43 9.70 9.55 9.23 7.43 7.14   HEMOGLOBIN g/dL 9.1* 9.4* 9.4* 8.8* 9.1* 8.7* 7.2*   HEMATOCRIT % 27.0* 28.3* 28.5* 26.5* 27.5* 25.8* 21.2*   PLATELETS 10*3/mm3 152 145* 143* 148* 164 185 182     Electrolytes, Magnesium and Phosphorus:    Results from last 7 days  Lab Units 10/05/18  0357 10/04/18  0408 10/03/18  0345 10/02/18  2157 10/02/18  1725 10/02/18  1359 10/01/18  0828   SODIUM mmol/L 136 139 145 144 143 141 137   CHLORIDE mmol/L 101 104 115* 113* 107 111* 100   POTASSIUM mmol/L 4.3 5.3 4.7 3.9 3.9 3.4* 4.1   CO2 mmol/L 33.0* 27.0 25.0 24.0 25.0 24.0 28.0   MAGNESIUM mg/dL 2.0 2.2 2.3 2.4 2.7 3.1*  --    PHOSPHORUS mg/dL 1.8* 3.1 3.7 1.1* 1.2* 2.0*  --      Renal:    Results from last 7 days  Lab Units 10/05/18  0357 10/04/18  0408 10/03/18  0345 10/02/18  2157 10/02/18  1725 10/02/18  1359 10/01/18  0828   CREATININE mg/dL 0.89 1.04 1.07 1.10 1.11 0.94 0.97   BUN mg/dL  16 18 17 16 14 13 13     Estimated Creatinine Clearance: 86.2 mL/min (by C-G formula based on SCr of 0.89 mg/dL).  Hepatic:    Results from last 7 days  Lab Units 10/05/18  0357 10/04/18  0408 10/01/18  0828 09/28/18  1421   ALK PHOS U/L 72 59 72 67   BILIRUBIN mg/dL 3.5* 5.2* 1.8* 1.4*   ALT (SGPT) U/L 928* 723* 30 23   AST (SGOT) U/L 436* 388* 33 29     Arterial Blood Gases:    Results from last 7 days  Lab Units 10/03/18  0700 10/03/18  0631 10/02/18  2044 10/02/18  1508 10/02/18  1324 10/02/18  1310 10/02/18  1238   PH, ARTERIAL pH units 7.409 7.411 7.343* 7.481* 7.46 7.49 7.40   PCO2, ARTERIAL mm Hg 37.6 37.2 44.5 32.0*  --   --   --    PO2 ART mm Hg 94.8 97.0 115.0* 480.0*  --   --   --    FIO2 % 30 30 35 100  --   --   --          Results from last 7 days  Lab Units 09/29/18  0600   HEMOGLOBIN A1C % 6.30*       No results found for: LACTATE    Relevant imaging studies and labs from 10/05/18 were reviewed and interpreted by me    Medications (drips):    niCARdipine    nitroglycerin    norepinephrine Last Rate: Stopped (10/04/18 0900)   phenylephrine          aspirin 325 mg Oral Daily   cholecalciferol 50,000 Units Oral Weekly   cyanocobalamin 1,000 mcg Intramuscular Weekly   ferric gluconate (FERRLECIT) IVPB 250 mg Intravenous Daily   insulin lispro 0-9 Units Subcutaneous 4x Daily With Meals & Nightly   metoprolol tartrate 25 mg Oral Q12H   pharmacy consult - MTM  Does not apply Daily   sennosides-docusate sodium 2 tablet Oral BID       Assessment/Plan   IMPRESSION / PLAN     Inpatient Problem List:  62 y.o.male:       Impression:  62 y.o.male with relevant PMH of CAD 2 stents, recent cath w/ 3vCAD EF 60%, HTN, HLD, NID-T2DM admitted 9/28/2018 w/ chest pain, cath 9/29 w/ 3vCAD, now post op 5vCABG by Dr. Bosch on 10/2.  Had some post op bleeding improved w/ 4 FFP, 2 Platelets, 3 PRBC.    Plan:  Post op care - per cts    DM A1c 6.3 / Stress Hyperglycemia - SQ insulin prn    Hypotension - weaned off  "pressors    Hypoxemia - pulmonary toilet, give 1 dose of lasix, may need to tap right effusion if it doesn't \"diurese\" off    Acute Blood Loss Anemia - also macrocytic on admit, Hgb dropped to 5.3 and transfused. Getting IV Iron x 4 doses.      Elevated LFTs - probably shock liver, monitor    B12 Deficiency - start IM B12, needs to follow this up with his PCP    Vit D Deficiency - start 50,000 units weekly po, also needs to follow up with PCP    Follow up iron, b12, vit D w/ PCP as outpatient    Monitor for complications    Resume appropriate home meds    Mechanical DVT prophylaxis    Nutrition - Diet Regular; Consistent Carbohydrate, Cardiac    Plan of care and goals reviewed with mulitdisciplinary team at daily rounds           Rui Velez MD  Intensive Care Medicine  10/05/18 10:40 AM       "

## 2018-10-05 NOTE — PROGRESS NOTES
"[facility] at [facility]  IP Progress Note   LOS: 4 days   Patient Care Team:  Justin Casas APRN as PCP - General (Family Medicine)    Chief Complaint: Follow up for Coronary Artery Disease    Subjective    Feeling better, still has sternal soreness.  No chest pain or shortness of breath.  Ambulated without difficulty.  Waiting to go to telemetry.    Tele: Sinus Rythym    Vitals:  Blood pressure 115/84, pulse 95, temperature 98.2 °F (36.8 °C), temperature source Oral, resp. rate 18, height 165.1 cm (65\"), weight 69.9 kg (154 lb), SpO2 100 %.     Intake/Output Summary (Last 24 hours) at 10/05/18 0820  Last data filed at 10/05/18 0600   Gross per 24 hour   Intake           507.19 ml   Output             2300 ml   Net         -1792.81 ml       Physical Exam:   General: No apparent distress.  Neck: no JVD.  Chest:No respiratory distress, slightly diminished at bases with fine scattered rhonchi.    Cardiovascular: Normal S1 and S2, no murmer, gallop or rub.    Extremities: No edema.        Results Review:     I reviewed the patient's new clinical results.      Results from last 7 days  Lab Units 10/05/18  0357   WBC 10*3/mm3 12.11*   HEMOGLOBIN g/dL 9.1*   HEMATOCRIT % 27.0*   PLATELETS 10*3/mm3 152       Results from last 7 days  Lab Units 10/05/18  0357   SODIUM mmol/L 136   POTASSIUM mmol/L 4.3   CHLORIDE mmol/L 101   CO2 mmol/L 33.0*   BUN mg/dL 16   CREATININE mg/dL 0.89   CALCIUM mg/dL 9.0   BILIRUBIN mg/dL 3.5*   ALK PHOS U/L 72   ALT (SGPT) U/L 928*   AST (SGOT) U/L 436*   GLUCOSE mg/dL 113*       Scheduled Meds:  aspirin 325 mg Oral Daily   atorvastatin 40 mg Oral Nightly   cyanocobalamin 1,000 mcg Intramuscular Weekly   ferric gluconate (FERRLECIT) IVPB 250 mg Intravenous Daily   insulin lispro 0-9 Units Subcutaneous 4x Daily With Meals & Nightly   metoprolol tartrate 12.5 mg Oral Q12H   pharmacy consult - MTM  Does not apply Daily   sennosides-docusate sodium 2 tablet Oral BID "       Continuous Infusions:  niCARdipine 5-15 mg/hr    nitroglycerin 5-200 mcg/min    norepinephrine 0.02-0.3 mcg/kg/min Last Rate: Stopped (10/04/18 0900)   phenylephrine 0.5-3 mcg/kg/min          Assessment:  CAD, status post CABG ×5, normal EF.  Hypertension, controlled.  Dyslipidemia, statin DC'd  due to elevated liver enzymes.  Elevated Liver Enzymes     Plan:  1. Increase metoprolol to 25 mg twice a day  2. Increase activities, transfer to telemetry okay with us.  3. No Statin for now  4. Monitor for recovery of liver function.    GREGG Amaya  10/05/18  8:20 AM    I have seen and examined the patient, case was discussed with the physician extender, reviewed the above note, necessary changes were made and I agree with the final note.   Kay Stevens MD, FACC, [facility]

## 2018-10-05 NOTE — PROGRESS NOTES
CTS Progress Note      POD 3 s/p CABGX5       LOS: 4 days   Patient Care Team:  Justin Casas APRN as PCP - General (Family Medicine)    Subjective  Off pressors  Awake, alert , cooperative  Up in chair  Walked X4 yesterday     CC: S/P CABGX5    Objective    Vital Signs  Temp:  [97.9 °F (36.6 °C)-98.4 °F (36.9 °C)] 98.2 °F (36.8 °C)  Heart Rate:  [] 92  Resp:  [16-20] 20  BP: ()/(56-97) 100/77    Physical Exam:   General Appearance: alert, appears stated age and cooperative   Lungs: clear to auscultation, respirations regular, respirations even and respirations unlabored   Heart: regular rhythm & normal rate, normal S1, S2, no murmur, no gallop, no rub and no click   Skin:  Incision c/d/i     Results     Results from last 7 days  Lab Units 10/05/18  0357   WBC 10*3/mm3 12.11*   HEMOGLOBIN g/dL 9.1*   HEMATOCRIT % 27.0*   PLATELETS 10*3/mm3 152       Results from last 7 days  Lab Units 10/05/18  0357   SODIUM mmol/L 136   POTASSIUM mmol/L 4.3   CHLORIDE mmol/L 101   CO2 mmol/L 33.0*   BUN mg/dL 16   CREATININE mg/dL 0.89   GLUCOSE mg/dL 113*   CALCIUM mg/dL 9.0           Imaging Results (last 24 hours)     Procedure Component Value Units Date/Time    XR Chest 1 View [[id number]] Updated:  10/05/18 0432    XR Chest 1 View [[id number]] Collected:  10/04/18 0833     Updated:  10/04/18 0932    Narrative:       EXAMINATION: XR CHEST 1 VW-10/04/2018:      INDICATION: Post-Op Heart Surgery; I20.0-Unstable angina;  I25.110-Atherosclerotic heart disease of native coronary artery with  unstable angina pectoris; Z74.09-Other reduced mobility.     TECHNIQUE:  Single view frontal chest.     COMPARISONS: 10/03/2018     FINDINGS:  [place]-Tio catheter has been removed but the IJ sheath on the  right remains in place. The esophagogastric and endotracheal tubes have  been removed. Other support apparatus and surgical remnants are  unchanged. Small layering pleural effusion on the right is unchanged. No  pneumothorax.         Impression:       Hardware removal. Otherwise, no change.     D:  10/04/2018  E:  10/04/2018     This report was finalized on 10/4/2018 9:30 AM by Blu Montes.             Assessment    Active Problems:    * No active hospital problems. *  Pain under fair control    Plan   To tele    GREGG Gamble  10/05/18  6:44 AM     Stable postoperative course.  Lungs clear wounds okay.  Hemodynamics normal.  No anginal quality chest pain. Agree with telemetry. I have reviewed, verified, and confirmed the above history and current status.  I have examined the patient and confirmed the above physical findings.Above plan and treatment regimen discussed in detail with patient.  Options of treatment, attendant risks vs benefits, and my recommendations were discussed and all questions answered.    Shane Bosch MD  [facility]  10/05/18   2:26 PM

## 2018-10-05 NOTE — PLAN OF CARE
Problem: Patient Care Overview  Goal: Plan of Care Review  Outcome: Ongoing (interventions implemented as appropriate)   10/05/18 0253   Coping/Psychosocial   Plan of Care Reviewed With patient   Plan of Care Review   Progress improving   OTHER   Outcome Summary Patient doing well off levo. Ambulating with no issues. Remains on 2-3L NC. Pain controlled with ordered PO meds. May need to look at restarting some home meds or increasing lopressor? BP stabilzed. HR sustains around 100bpm at rest.        Problem: Cardiac Surgery (Adult)  Goal: Signs and Symptoms of Listed Potential Problems Will be Absent, Minimized or Managed (Cardiac Surgery)  Outcome: Ongoing (interventions implemented as appropriate)   10/05/18 0253   Goal/Outcome Evaluation   Problems Assessed (Cardiac Surgery) all   Problems Present (Cardiac Surgery) bowel motility decreased     Goal: Anesthesia/Sedation Recovery  Outcome: Ongoing (interventions implemented as appropriate)   10/05/18 0253   Goal/Outcome Evaluation   Anesthesia/Sedation Recovery criteria met for transfer       Problem: Skin Injury Risk (Adult)  Goal: Identify Related Risk Factors and Signs and Symptoms  Outcome: Ongoing (interventions implemented as appropriate)   10/05/18 0253   Skin Injury Risk (Adult)   Related Risk Factors (Skin Injury Risk) critical care admission     Goal: Skin Health and Integrity  Outcome: Ongoing (interventions implemented as appropriate)   10/05/18 0253   Skin Injury Risk (Adult)   Skin Health and Integrity making progress toward outcome

## 2018-10-05 NOTE — THERAPY DISCHARGE NOTE
Acute Care - Physical Therapy Treatment Note/Discharge  [facility]     Patient Name: Judah Cerna  : 1955  MRN: [id number]  Today's Date: 10/5/2018  Onset of Illness/Injury or Date of Surgery: 18  Date of Referral to PT: 10/02/18  Referring Physician: GREGG Wheeler    Admit Date: 2018    Visit Dx:    ICD-10-CM ICD-9-CM   1. Unstable angina (CMS/HCC) I20.0 411.1   2. Coronary artery disease involving native coronary artery of native heart with unstable angina pectoris (CMS/HCC) I25.110 414.01     411.1   3. Impaired functional mobility, balance, gait, and endurance Z74.09 V49.89     Patient Active Problem List   Diagnosis   • CAD (coronary artery disease)   • Hypertension   • Dyslipidemia   • Type 2 diabetes mellitus (CMS/[facility])   • Abnormal EKG       Physical Therapy Education     Title: PT OT SLP Therapies (Active)     Topic: Physical Therapy (Active)     Point: Mobility training (Active)    Learning Progress Summary     Learner Status Readiness Method Response Comment Documented by    Patient Done Acceptance E,TB VU  SHENA 10/05/18 0825     Active Acceptance E NR  SHENA 10/04/18 0930     Active Acceptance E NR  SHENA 10/04/18 0930     Active Acceptance E NR  KR 10/03/18 1336    Significant Other Active Acceptance E NR  KR 10/03/18 1336          Point: Home exercise program (Done)    Learning Progress Summary     Learner Status Readiness Method Response Comment Documented by    Patient Done Acceptance E,TB LEEANN  SHENA 10/05/18 0825     Active Acceptance E NR  SHENA 10/04/18 0930     Active Acceptance E NR  SHENA 10/04/18 0930          Point: Body mechanics (Active)    Learning Progress Summary     Learner Status Readiness Method Response Comment Documented by    Patient Done Acceptance E,TB VU  SHENA 10/05/18 0825     Active Acceptance E NR  SHENA 10/04/18 0930     Active Acceptance E NR  SHENA 10/04/18 0930     Active Acceptance E NR  KR 10/03/18 1336    Significant Other Active Acceptance E NR  KR 10/03/18 1336           Point: Precautions (Active)    Learning Progress Summary     Learner Status Readiness Method Response Comment Documented by    Patient Done Acceptance E,TB VU  SHENA 10/05/18 0825     Active Acceptance E NR  SHENA 10/04/18 0930     Active Acceptance E NR  SHENA 10/04/18 0930     Active Acceptance E NR  KR 10/03/18 1336    Significant Other Active Acceptance E NR  KR 10/03/18 1336                      User Key     Initials Effective Dates Name Provider Type Discipline    SHENA 06/19/15 -  Leslie Posada, PT Physical Therapist PT    KR 04/03/18 -  Ladi Garcia, PT Physical Therapist PT                    PT Rehab Goals     Row Name 10/05/18 1100             Bed Mobility Goal 1 (PT)    Activity/Assistive Device (Bed Mobility Goal 1, PT) bed mobility activities, all  -SHENA      [place] Level/Cues Needed (Bed Mobility Goal 1, PT) independent  -SHENA      Time Frame (Bed Mobility Goal 1, PT) 2 weeks  -SHENA      Progress/Outcomes (Bed Mobility Goal 1, PT) goal met  -SHENA         Transfer Goal 1 (PT)    Activity/Assistive Device (Transfer Goal 1, PT) sit-to-stand/stand-to-sit;bed-to-chair/chair-to-bed  -SHENA      [place] Level/Cues Needed (Transfer Goal 1, PT) independent  -SHENA      Time Frame (Transfer Goal 1, PT) 2 weeks  -SHENA      Progress/Outcome (Transfer Goal 1, PT) goal met  -SHENA         Gait Training Goal 1 (PT)    Activity/Assistive Device (Gait Training Goal 1, PT) gait (walking locomotion)  -SHENA      [place] Level (Gait Training Goal 1, PT) independent  -SHENA      Distance (Gait Goal 1, PT) 400 feet  -SHENA      Time Frame (Gait Training Goal 1, PT) 2 weeks  -SHENA      Progress/Outcome (Gait Training Goal 1, PT) goal ongoing;goal met  -SHENA         Stairs Goal 1 (PT)    Activity/Assistive Device (Stairs Goal 1, PT) ascending stairs;descending stairs;using handrail, right;using handrail, left  -SHENA      [place] Level/Cues Needed (Stairs Goal 1, PT) supervision required  -SHENA      Number of Stairs (Stairs Goal 1, PT) 13   -SHENA      Time Frame (Stairs Goal 1, PT) 2 weeks  -SHENA      Progress/Outcome (Stairs Goal 1, PT) goal met  -SHENA        User Key  (r) = Recorded By, (t) = Taken By, (c) = Cosigned By    Initials Name Provider Type Discipline    Leslie Valles, PT Physical Therapist PT        Therapy Treatment        Rehabilitation Treatment Summary     Row Name 10/05/18 0825             Treatment Time/Intention    Discipline physical therapist  -SHENA      Document Type discharge treatment  -SHENA      Subjective Information no complaints  -SHENA      Mode of Treatment physical therapy  -SHENA      Care Plan Review care plan/treatment goals reviewed;risks/benefits reviewed;patient/other agree to care plan  -SHENA      Therapy Frequency (PT Clinical Impression) daily  -SHENA      Patient Effort excellent  -SHENA      Existing Precautions/Restrictions cardiac;sternal  -SHENA      Recorded by [SHENA] Leslie Posada, PT 10/05/18 1127      Row Name 10/05/18 0825             Vital Signs    Pre Systolic BP Rehab 100  -SHENA      Pre Treatment Diastolic BP 78  -SHENA      Post Systolic BP Rehab 107  -SHENA      Post Treatment Diastolic BP 79  -SHENA      Pretreatment Heart Rate (beats/min) 108  -SHENA      Intratreatment Heart Rate (beats/min) 120  -SHENA      Posttreatment Heart Rate (beats/min) 104  -SHENA      Pre SpO2 (%) 100  -SHENA      O2 Delivery Pre Treatment nasal cannula  -SHENA      Post SpO2 (%) 100  -HSENA      O2 Delivery Post Treatment supplemental O2  -SHENA      Pre Patient Position Sitting  -SHENA      Intra Patient Position Standing  -SHENA      Post Patient Position Sitting  -SHENA      Recorded by [SHENA] Leslie Posada, PT 10/05/18 1127      Row Name 10/05/18 0825             Cognitive Assessment/Intervention- PT/OT    Affect/Mental Status (Cognitive) WFL  -SHENA      Orientation Status (Cognition) oriented x 4  -SHENA      Follows Commands (Cognition) WFL  -SHENA      Cognitive Function (Cognitive) WFL  -SHENA      Personal Safety Interventions gait belt;nonskid shoes/slippers when out of  bed  -SHENA      Recorded by [SHENA] Leslie Posada, PT 10/05/18 1127      Row Name 10/05/18 0825             Bed Mobility Assessment/Treatment    Bed Mobility Assessment/Treatment sit-supine;supine-sit-supine  -SHENA      Sit-Supine [place] (Bed Mobility) independent  -SHENA      Supine-Sit-Supine [place] (Bed Mobility) independent  -SHENA      Bed Mobility, Safety Issues decreased use of arms for pushing/pulling  -SHENA      Recorded by [SHENA] Leslie Posada, PT 10/05/18 1127      Row Name 10/05/18 0825             Transfer Assessment/Treatment    Transfer Assessment/Treatment sit-stand transfer;stand-sit transfer  -SHENA      Recorded by [SHENA] Leslie Posada, PT 10/05/18 1127      Row Name 10/05/18 0825             Sit-Stand Transfer    Sit-Stand [place] (Transfers) independent  -SHENA      Recorded by [SHENA] Leslie Posada, PT 10/05/18 1127      Row Name 10/05/18 0825             Stand-Sit Transfer    Stand-Sit [place] (Transfers) independent  -SHENA      Recorded by [SHENA] Leslie Posada, PT 10/05/18 1127      Row Name 10/05/18 0825             Gait/Stairs Assessment/Training    Gait/Stairs Assessment/Training gait/ambulation independence  -SHENA      [place] Level (Gait) independent  -SHENA      Distance in Feet (Gait) 600  -SHENA      Pattern (Gait) step-through  -SHENA      Negotiation (Stairs) stairs independence;handrail location;number of steps;ascending technique;descending technique  -SHENA      [place] Level (Stairs) independent  -SHENA      Handrail Location (Stairs) left side (ascending)  -SHENA      Number of Steps (Stairs) 10  -SHENA      Ascending Technique (Stairs) step-over-step  -SHENA      Descending Technique (Stairs) step-over-step  -SHENA      Recorded by [SHENA] Leslie Posada, PT 10/05/18 1127      Row Name 10/05/18 0825             Therapeutic Exercise    07078 - PT Therapeutic Activity Minutes 23  -SHENA      Recorded by [SHENA] Leslie Posada, PT 10/05/18 1127      Row Name 10/05/18 0825              Upper Extremity Seated Therapeutic Exercise    Performed, Seated Upper Extremity (Therapeutic Exercise) shoulder flexion/extension;shoulder abduction/adduction;elbow flexion/extension;wrist flexion/extension  -SHENA      Exercise Type, Seated Upper Extremity (Therapeutic Exercise) AROM (active range of motion)  -SHENA      Sets/Reps Detail, Seated Upper Extremity (Therapeutic Exercise) 1/10  -SHENA      Recorded by [SHENA] Leslie Posada, PT 10/05/18 1127      Row Name 10/05/18 0825             Lower Extremity Seated Therapeutic Exercise    Performed, Seated Lower Extremity (Therapeutic Exercise) hip flexion/extension;knee flexion/extension;ankle dorsiflexion/plantarflexion  -SHEAN      Exercise Type, Seated Lower Extremity (Therapeutic Exercise) AROM (active range of motion)  -SHENA      Sets/Reps Detail, Seated Lower Extremity (Therapeutic Exercise) 1/10  -SHENA      Recorded by [SHENA] Leslie Posada, PT 10/05/18 1127      Row Name 10/05/18 0825             Static Sitting Balance    Level of [place] (Unsupported Sitting, Static Balance) independent  -SHENA      Sitting Position (Unsupported Sitting, Static Balance) sitting on edge of bed  -SHENA      Recorded by [SHENA] Leslie Posada, PT 10/05/18 1127      Row Name 10/05/18 0825             Static Standing Balance    Level of [place] (Supported Standing, Static Balance) independent  -SHENA      Recorded by [SHENA] Leslie Posada, PT 10/05/18 1127      Row Name 10/05/18 0825             Positioning and Restraints    Pre-Treatment Position sitting in chair/recliner  -SHENA      Post Treatment Position chair  -SHENA      In Chair notified nsg;reclined;sitting;call light within reach  -SHENA      Recorded by [SHENA] Leslie Posada, PT 10/05/18 1127      Row Name 10/05/18 0825             Pain Scale: Numbers Pre/Post-Treatment    Pain Scale: Numbers, Pretreatment 0/10 - no pain  -SHENA      Pain Scale: Numbers, Post-Treatment 3/10  -SHENA      Pain Location - Orientation incisional  -SHENA       Pain Location chest  -SHENA      Pain Intervention(s) Repositioned;Ambulation/increased activity;Medication (See MAR);Splinting  -SHENA      Recorded by [SHENA] Leslie Posada, PT 10/05/18 1127      Row Name                Wound 10/02/18 1006 chest incision    Wound - Properties Group Date first assessed: 10/02/18 [SH] Time first assessed: 1006 [SH] Location: chest [SH] Type: incision [SH] Recorded by:  [KAELYN] Haase, Sherri L, RN 10/02/18 1006    Row Name                Wound 10/02/18 1013 Right leg incision    Wound - Properties Group Date first assessed: 10/02/18 [SH] Time first assessed: 1013 [SH] Side: Right [SH] Location: leg [SH] Type: incision [SH] Recorded by:  [KAELYN] Haase, Sherri L, RN 10/02/18 1013    Row Name 10/05/18 0825             Coping    Observed Emotional State calm;cooperative  -SHENA      Verbalized Emotional State acceptance  -SHENA      Recorded by [SHENA] Leslie Posada, PT 10/05/18 1127      Row Name 10/05/18 0825             Plan of Care Review    Plan of Care Reviewed With patient  -SHENA      Recorded by [SHENA] Leslie Posada, PT 10/05/18 1127      Row Name 10/05/18 0825             Outcome Summary/Treatment Plan (PT)    Daily Summary of Progress (PT) progress toward functional goals as expected  -SHENA      Anticipated Discharge Disposition (PT) home with assist  -SHENA      Reason for Discharge (PT Discharge Summary) patient met all goals and outcomes;no further needs identified  -SHENA      Recorded by [SHENA] Leslie Posada, PT 10/05/18 1127        User Key  (r) = Recorded By, (t) = Taken By, (c) = Cosigned By    Initials Name Effective Dates Discipline    SHENA Leslie Posada, PT 06/19/15 -  PT    SH Haase, Sherri L, RN 06/16/16 -  Nurse        Wound 10/02/18 1006 chest incision (Active)   Dressing Appearance open to air 10/5/2018 10:00 AM   Closure Liquid skin adhesive 10/5/2018 10:00 AM   Drainage Amount none 10/5/2018 10:00 AM   Care, Wound cleansed with;soap and water 10/4/2018  8:00 PM   Dressing  Care, Wound open to air 10/5/2018  8:00 AM       Wound 10/02/18 1013 Right leg incision (Active)   Dressing Appearance open to air 10/5/2018 10:00 AM   Closure Liquid skin adhesive 10/5/2018 10:00 AM   Drainage Amount none 10/5/2018 10:00 AM   Care, Wound cleansed with;soap and water 10/4/2018  8:00 PM   Dressing Care, Wound open to air 10/5/2018  8:00 AM       PT Recommendation and Plan  Anticipated Discharge Disposition (PT): home with assist  Therapy Frequency (PT Clinical Impression): daily  Outcome Summary/Treatment Plan (PT)  Daily Summary of Progress (PT): progress toward functional goals as expected  Anticipated Discharge Disposition (PT): home with assist  Reason for Discharge (PT Discharge Summary): patient met all goals and outcomes, no further needs identified  Plan of Care Reviewed With: patient  Progress: improving  Outcome Summary: patient was able to ambulate 600 ft and up and down 1 flight of stairs with stable vitals. patient to continue ambulation with family or RN staf  until D/C          Outcome Measures     Row Name 10/05/18 0825 10/04/18 0930 10/03/18 1336       How much help from another person do you currently need...    Turning from your back to your side while in flat bed without using bedrails? 4  -SHENA 3  -SHENA 3  -KR    Moving from lying on back to sitting on the side of a flat bed without bedrails? 4  -SHENA 3  -SHENA 2  -KR    Moving to and from a bed to a chair (including a wheelchair)? 4  -SHENA 3  -SHENA 3  -KR    Standing up from a chair using your arms (e.g., wheelchair, bedside chair)? 4  -SHENA 3  -SHENA 3  -KR    Climbing 3-5 steps with a railing? 4  -SHENA 3  -SHENA 2  -KR    To walk in hospital room? 4  -SHENA 3  -SHENA 3  -KR    AM-PAC 6 Clicks Score 24  -SHENA 18  -SHENA 16  -KR       Functional Assessment    Outcome Measure Options  --  -- AM-PAC 6 Clicks Basic Mobility (PT)  -KR      User Key  (r) = Recorded By, (t) = Taken By, (c) = Cosigned By    Initials Name Provider Type    Leslie Valles, PT  Physical Therapist    Ladi Peña, PT Physical Therapist           Time Calculation:         PT Charges     Row Name 10/05/18 0825             Time Calculation    Start Time 0825  -SHENA      PT Received On 10/05/18  -SHENA      PT Goal Re-Cert Due Date 10/13/18  -SHENA         Time Calculation- PT    Total Timed Code Minutes- PT 23 minute(s)  -SHENA         Timed Charges    39348 - PT Therapeutic Activity Minutes 23  -SHENA        User Key  (r) = Recorded By, (t) = Taken By, (c) = Cosigned By    Initials Name Provider Type    Leslie Valles, PT Physical Therapist        Therapy Suggested Charges     Code   Minutes Charges    61965 (CPT®) Hc Pt Neuromusc Re Education Ea 15 Min      26346 (CPT®) Hc Pt Ther Proc Ea 15 Min      08218 (CPT®) Hc Gait Training Ea 15 Min      82126 (CPT®) Hc Pt Therapeutic Act Ea 15 Min 23 2    51072 (CPT®) Hc Pt Manual Therapy Ea 15 Min      70751 (CPT®) Hc Pt Iontophoresis Ea 15 Min      34222 (CPT®) Hc Pt Elec Stim Ea-Per 15 Min      11940 (CPT®) Hc Pt Ultrasound Ea 15 Min      74775 (CPT®) Hc Pt Self Care/Mgmt/Train Ea 15 Min      91621 (CPT®) Hc Pt Prosthetic (S) Train Initial Encounter, Each 15 Min      63898 (CPT®) Hc Pt Orthotic(S)/Prosthetic(S) Encounter, Each 15 Min      30643 (CPT®) Hc Orthotic(S) Mgmt/Train Initial Encounter, Each 15min      Total  23 2          Therapy Charges for Today     Code Description Service Date Service Provider Modifiers Qty    78589334016 HC PT THERAPEUTIC ACT EA 15 MIN 10/4/2018 Leslie Posada, PT GP 2    92809133443 HC PT THERAPEUTIC ACT EA 15 MIN 10/5/2018 Leslie Posada, PT GP 2          PT G-Codes  Outcome Measure Options: AM-PAC 6 Clicks Basic Mobility (PT)  AM-PAC 6 Clicks Score: 24    PT Discharge Summary  Anticipated Discharge Disposition (PT): home with assist  Reason for Discharge: All goals achieved  Outcomes Achieved: Able to achieve all goals within established timeline  Discharge Destination: Home with assist    Leslie WESTBROOK  Swetha, PT  10/5/2018

## 2018-10-05 NOTE — PROGRESS NOTES
Continued Stay Note  [facility]     Patient Name: Judah Cerna  MRN: [id number]  Today's Date: 10/5/2018    Admit Date: 9/28/2018          Discharge Plan     Row Name 10/05/18 1041       Plan    Plan Home     Plan Comments Patient has orders to transfer out of ICU to tele bed today. Patient walked 800 ft with PT this am on room air. Plan home with wife at discharge. Nisreen @ [phone]               Discharge Codes    No documentation.       Expected Discharge Date and Time     Expected Discharge Date Expected Discharge Time    Oct 5, 2018             Radha Guevara RN

## 2018-10-05 NOTE — PLAN OF CARE
Problem: Patient Care Overview  Goal: Plan of Care Review  Outcome: Outcome(s) achieved Date Met: 10/05/18   10/05/18 0897   Coping/Psychosocial   Plan of Care Reviewed With patient   Plan of Care Review   Progress improving   OTHER   Outcome Summary patient was able to ambulate 600 ft and up and down 1 flight of stairs with stable vitals. patient to continue ambulation with family or RN staf until D/C

## 2018-10-06 ENCOUNTER — APPOINTMENT (OUTPATIENT)
Dept: GENERAL RADIOLOGY | Facility: HOSPITAL | Age: 63
End: 2018-10-06

## 2018-10-06 LAB
ALBUMIN SERPL-MCNC: 4.13 G/DL (ref 3.2–4.8)
ALBUMIN/GLOB SERPL: 1.8 G/DL (ref 1.5–2.5)
ALP SERPL-CCNC: 89 U/L (ref 25–100)
ALT SERPL W P-5'-P-CCNC: 704 U/L (ref 7–40)
ANION GAP SERPL CALCULATED.3IONS-SCNC: 11 MMOL/L (ref 3–11)
AST SERPL-CCNC: 204 U/L (ref 0–33)
BASOPHILS # BLD AUTO: 0.03 10*3/MM3 (ref 0–0.2)
BASOPHILS NFR BLD AUTO: 0.3 % (ref 0–1)
BILIRUB SERPL-MCNC: 2.7 MG/DL (ref 0.3–1.2)
BUN BLD-MCNC: 17 MG/DL (ref 9–23)
BUN/CREAT SERPL: 20 (ref 7–25)
CALCIUM SPEC-SCNC: 9.2 MG/DL (ref 8.7–10.4)
CHLORIDE SERPL-SCNC: 101 MMOL/L (ref 99–109)
CO2 SERPL-SCNC: 27 MMOL/L (ref 20–31)
CREAT BLD-MCNC: 0.85 MG/DL (ref 0.6–1.3)
DEPRECATED RDW RBC AUTO: 74.2 FL (ref 37–54)
EOSINOPHIL # BLD AUTO: 0.38 10*3/MM3 (ref 0–0.3)
EOSINOPHIL NFR BLD AUTO: 3.2 % (ref 0–3)
ERYTHROCYTE [DISTWIDTH] IN BLOOD BY AUTOMATED COUNT: 20.8 % (ref 11.3–14.5)
GFR SERPL CREATININE-BSD FRML MDRD: 111 ML/MIN/1.73
GLOBULIN UR ELPH-MCNC: 2.3 GM/DL
GLUCOSE BLD-MCNC: 114 MG/DL (ref 70–100)
GLUCOSE BLDC GLUCOMTR-MCNC: 124 MG/DL (ref 70–130)
GLUCOSE BLDC GLUCOMTR-MCNC: 151 MG/DL (ref 70–130)
GLUCOSE BLDC GLUCOMTR-MCNC: 161 MG/DL (ref 70–130)
GLUCOSE BLDC GLUCOMTR-MCNC: 176 MG/DL (ref 70–130)
HCT VFR BLD AUTO: 31.3 % (ref 38.9–50.9)
HGB BLD-MCNC: 10.2 G/DL (ref 13.1–17.5)
IMM GRANULOCYTES # BLD: 0.05 10*3/MM3 (ref 0–0.03)
IMM GRANULOCYTES NFR BLD: 0.4 % (ref 0–0.6)
LYMPHOCYTES # BLD AUTO: 1.26 10*3/MM3 (ref 0.6–4.8)
LYMPHOCYTES NFR BLD AUTO: 10.5 % (ref 24–44)
MAGNESIUM SERPL-MCNC: 1.9 MG/DL (ref 1.3–2.7)
MCH RBC QN AUTO: 32.9 PG (ref 27–31)
MCHC RBC AUTO-ENTMCNC: 32.6 G/DL (ref 32–36)
MCV RBC AUTO: 101 FL (ref 80–99)
MONOCYTES # BLD AUTO: 1.28 10*3/MM3 (ref 0–1)
MONOCYTES NFR BLD AUTO: 10.7 % (ref 0–12)
NEUTROPHILS # BLD AUTO: 8.95 10*3/MM3 (ref 1.5–8.3)
NEUTROPHILS NFR BLD AUTO: 74.9 % (ref 41–71)
PHOSPHATE SERPL-MCNC: 2.2 MG/DL (ref 2.4–5.1)
PLATELET # BLD AUTO: 171 10*3/MM3 (ref 150–450)
PMV BLD AUTO: 10.5 FL (ref 6–12)
POTASSIUM BLD-SCNC: 3.6 MMOL/L (ref 3.5–5.5)
PROT SERPL-MCNC: 6.4 G/DL (ref 5.7–8.2)
RBC # BLD AUTO: 3.1 10*6/MM3 (ref 4.2–5.76)
SODIUM BLD-SCNC: 139 MMOL/L (ref 132–146)
WBC NRBC COR # BLD: 11.95 10*3/MM3 (ref 3.5–10.8)

## 2018-10-06 PROCEDURE — 99024 POSTOP FOLLOW-UP VISIT: CPT | Performed by: THORACIC SURGERY (CARDIOTHORACIC VASCULAR SURGERY)

## 2018-10-06 PROCEDURE — 84100 ASSAY OF PHOSPHORUS: CPT | Performed by: INTERNAL MEDICINE

## 2018-10-06 PROCEDURE — 80053 COMPREHEN METABOLIC PANEL: CPT | Performed by: INTERNAL MEDICINE

## 2018-10-06 PROCEDURE — 99221 1ST HOSP IP/OBS SF/LOW 40: CPT | Performed by: NURSE PRACTITIONER

## 2018-10-06 PROCEDURE — 82962 GLUCOSE BLOOD TEST: CPT

## 2018-10-06 PROCEDURE — 25010000002 NA FERRIC GLUC CPLX PER 12.5 MG: Performed by: INTERNAL MEDICINE

## 2018-10-06 PROCEDURE — 94799 UNLISTED PULMONARY SVC/PX: CPT

## 2018-10-06 PROCEDURE — 85025 COMPLETE CBC W/AUTO DIFF WBC: CPT | Performed by: INTERNAL MEDICINE

## 2018-10-06 PROCEDURE — 83735 ASSAY OF MAGNESIUM: CPT | Performed by: INTERNAL MEDICINE

## 2018-10-06 PROCEDURE — 99232 SBSQ HOSP IP/OBS MODERATE 35: CPT | Performed by: INTERNAL MEDICINE

## 2018-10-06 PROCEDURE — 71045 X-RAY EXAM CHEST 1 VIEW: CPT

## 2018-10-06 RX ORDER — METOPROLOL TARTRATE 50 MG/1
50 TABLET, FILM COATED ORAL EVERY 12 HOURS SCHEDULED
Status: DISCONTINUED | OUTPATIENT
Start: 2018-10-06 | End: 2018-10-06

## 2018-10-06 RX ADMIN — INSULIN LISPRO 2 UNITS: 100 INJECTION, SOLUTION INTRAVENOUS; SUBCUTANEOUS at 20:48

## 2018-10-06 RX ADMIN — HYDROCODONE BITARTRATE AND ACETAMINOPHEN 1 TABLET: 7.5; 325 TABLET ORAL at 16:43

## 2018-10-06 RX ADMIN — OXYCODONE HYDROCHLORIDE AND ACETAMINOPHEN 2 TABLET: 5; 325 TABLET ORAL at 22:01

## 2018-10-06 RX ADMIN — OXYCODONE HYDROCHLORIDE AND ACETAMINOPHEN 2 TABLET: 5; 325 TABLET ORAL at 00:10

## 2018-10-06 RX ADMIN — INSULIN LISPRO 2 UNITS: 100 INJECTION, SOLUTION INTRAVENOUS; SUBCUTANEOUS at 13:16

## 2018-10-06 RX ADMIN — HYDROCODONE BITARTRATE AND ACETAMINOPHEN 1 TABLET: 7.5; 325 TABLET ORAL at 11:10

## 2018-10-06 RX ADMIN — SODIUM CHLORIDE 250 MG: 9 INJECTION, SOLUTION INTRAVENOUS at 11:10

## 2018-10-06 RX ADMIN — POTASSIUM & SODIUM PHOSPHATES POWDER PACK 280-160-250 MG 2 PACKET: 280-160-250 PACK at 11:10

## 2018-10-06 RX ADMIN — HYDROCODONE BITARTRATE AND ACETAMINOPHEN 1 TABLET: 7.5; 325 TABLET ORAL at 20:48

## 2018-10-06 RX ADMIN — OXYCODONE HYDROCHLORIDE AND ACETAMINOPHEN 2 TABLET: 5; 325 TABLET ORAL at 05:15

## 2018-10-06 RX ADMIN — METOPROLOL TARTRATE 25 MG: 25 TABLET ORAL at 20:48

## 2018-10-06 RX ADMIN — ALPRAZOLAM 0.5 MG: 0.5 TABLET ORAL at 20:48

## 2018-10-06 RX ADMIN — METOPROLOL TARTRATE 25 MG: 25 TABLET ORAL at 08:21

## 2018-10-06 RX ADMIN — INSULIN LISPRO 2 UNITS: 100 INJECTION, SOLUTION INTRAVENOUS; SUBCUTANEOUS at 16:44

## 2018-10-06 RX ADMIN — DOCUSATE SODIUM AND SENNOSIDES 2 TABLET: 50; 8.6 TABLET ORAL at 20:48

## 2018-10-06 RX ADMIN — DOCUSATE SODIUM AND SENNOSIDES 2 TABLET: 50; 8.6 TABLET ORAL at 08:20

## 2018-10-06 RX ADMIN — ASPIRIN 325 MG: 325 TABLET, DELAYED RELEASE ORAL at 08:20

## 2018-10-06 NOTE — PLAN OF CARE
Problem: Patient Care Overview  Goal: Plan of Care Review  Outcome: Ongoing (interventions implemented as appropriate)   10/06/18 0101   Coping/Psychosocial   Plan of Care Reviewed With patient   Plan of Care Review   Progress improving   OTHER   Outcome Summary pt transfered out of icu, doing well c/o pian at incision, walks frequently, does IS, vss, o2 sat on room air 97% will monitor   Coping/Psychosocial   Patient Agreement with Plan of Care agrees       Problem: Cardiac Surgery (Adult)  Goal: Signs and Symptoms of Listed Potential Problems Will be Absent, Minimized or Managed (Cardiac Surgery)  Outcome: Ongoing (interventions implemented as appropriate)

## 2018-10-06 NOTE — PROGRESS NOTES
4 Days Post-Op CABG   LOS: 5 days   Patient Care Team:  Justin Casas APRN as PCP - General (Family Medicine)    Chief complaint: CAD    Subjective   Denies chest pain, denies shortness of breath    Objective    Vital Signs  Temp:  [98.1 °F (36.7 °C)-98.9 °F (37.2 °C)] 98.9 °F (37.2 °C)  Heart Rate:  [] 96  Resp:  [16-20] 18  BP: (100-118)/(62-87) 115/82    Physical Exam:   General Appearance: alert, appears stated age and cooperative   Lungs: clear bilaterally   Heart: Regular rate and rhythm   Skin:  Incision c/d/i   .  Extremities no edema  Results     Results from last 7 days  Lab Units 10/05/18  0357   WBC 10*3/mm3 12.11*   HEMOGLOBIN g/dL 9.1*   HEMATOCRIT % 27.0*   PLATELETS 10*3/mm3 152       Results from last 7 days  Lab Units 10/05/18  0357   SODIUM mmol/L 136   POTASSIUM mmol/L 4.3   CHLORIDE mmol/L 101   CO2 mmol/L 33.0*   BUN mg/dL 16   CREATININE mg/dL 0.89   GLUCOSE mg/dL 113*   CALCIUM mg/dL 9.0               Assessment  Coronary artery disease  Stable postop CABG ×5 on his fourth postoperative day      Plan   .  Continue mobilization  Beta blockers, aspirin and Lipitor    Chivo Hamilton PA-C  10/06/18  6:57 AM

## 2018-10-06 NOTE — PROGRESS NOTES
Nutrition Services    Patient Name:  Judah Cerna  YOB: 1955  MRN: [id number]  Admit Date:  9/28/2018                      Clinical Nutrition     Multidisciplinary Rounds    Time: 20min  Patient Name: Judah Cerna  Date of Encounter: 10/06/18 12:52 AM  MRN: [id number]  Admission date: 9/28/2018      Reason for visit: MDR. RD to continue to follow per protocol.     Additional information obtained during MDR: s/p CABG; HTN, HLP DM II.  Noted pt very active prior to hospitalization. Benefiting from this. Note Phos replaced.  Pt allows doing well.      Current diet: Diet Regular; Consistent Carbohydrate, Cardiac    Intake: 50% consumption of 4 meals recorded.    EMR reviewed     Intervention:  Follow treatment plan  Care plan reviewed  Menu provided.     Follow up:   Per protocol      Rosalia Douglas RD  12:52 AM         Electronically signed by:  Rosalia Douglas RD  10/06/18 12:52 AM

## 2018-10-06 NOTE — PLAN OF CARE
Problem: Patient Care Overview  Goal: Plan of Care Review  Outcome: Ongoing (interventions implemented as appropriate)   10/06/18 1800 10/06/18 1816   Coping/Psychosocial   Plan of Care Reviewed With patient --    Plan of Care Review   Progress --  improving   OTHER   Outcome Summary --  Patient is tachy with HR in the low 100s. Pt is on RA. Patient complains of mild incisional pain, PRNS administered. Will Continue to monitor.       Problem: Cardiac Surgery (Adult)  Goal: Signs and Symptoms of Listed Potential Problems Will be Absent, Minimized or Managed (Cardiac Surgery)  Outcome: Ongoing (interventions implemented as appropriate)    Goal: Anesthesia/Sedation Recovery  Outcome: Ongoing (interventions implemented as appropriate)      Problem: Fall Risk (Adult)  Goal: Absence of Fall  Outcome: Ongoing (interventions implemented as appropriate)      Problem: Skin Injury Risk (Adult)  Goal: Skin Health and Integrity  Outcome: Ongoing (interventions implemented as appropriate)

## 2018-10-06 NOTE — PROGRESS NOTES
[facility] at [facility]  Cardiovascular Consultation Note  Judah Cerna  S446/1  1955    DATE OF ADMISSION: 9/28/2018  DATE OF FOLLOW UP: 10/06/18    Justin Casas APRN    Subjective:     Patient ID: Judah Cerna is a 62 y.o. male.    Chief Complaint: HTN, CAD  Chief Complaint   Patient presents with   • Chest Pain       Allergies:   No Known Allergies     Current medications:    Current Facility-Administered Medications:   •  acetaminophen (TYLENOL) tablet 650 mg, 650 mg, Oral, Q4H PRN, Shane Bosch MD, 650 mg at 10/03/18 0014  •  ALPRAZolam (XANAX) tablet 0.5 mg, 0.5 mg, Oral, BID PRN, Nadir Tanner MD, 0.5 mg at 10/03/18 1954  •  aspirin EC tablet 325 mg, 325 mg, Oral, Daily, Tarik Wheeler PA, 325 mg at 10/06/18 0820  •  bisacodyl (DULCOLAX) EC tablet 10 mg, 10 mg, Oral, Daily PRN, Tarik Wheeler PA  •  cholecalciferol (VITAMIN D3) capsule 50,000 Units, 50,000 Units, Oral, Weekly, Rui Velez MD, 50,000 Units at 10/05/18 1201  •  cyanocobalamin injection 1,000 mcg, 1,000 mcg, Intramuscular, Weekly, Rui Velez MD, 1,000 mcg at 10/04/18 1200  •  docusate sodium (COLACE) capsule 100 mg, 100 mg, Oral, BID PRN, Tarik Wheeler PA, 100 mg at 10/04/18 0840  •  ferric gluconate (FERRLECIT) 250 mg in sodium chloride 0.9 % 120 mL IVPB, 250 mg, Intravenous, Daily, Rui Velez MD, Last Rate: 60 mL/hr at 10/05/18 1043, 250 mg at 10/05/18 1043  •  HYDROcodone-acetaminophen (NORCO) 7.5-325 MG per tablet 1 tablet, 1 tablet, Oral, Q4H PRN, Shane Bosch MD, 1 tablet at 10/05/18 2320  •  insulin lispro (humaLOG) injection 0-9 Units, 0-9 Units, Subcutaneous, 4x Daily With Meals & Nightly, Rui Velez MD, 2 Units at 10/05/18 1652  •  ipratropium-albuterol (DUO-NEB) nebulizer solution 3 mL, 3 mL, Nebulization, Q4H PRN, Bryan Kirk PA  •  metoprolol tartrate (LOPRESSOR) tablet 25 mg, 25 mg,  Oral, Q12H, Kay Stevens MD, 25 mg at 10/06/18 0821  •  Morphine sulfate (PF) injection 2 mg, 2 mg, Intravenous, Q30 Min PRN, Shane Bosch MD, 2 mg at 10/04/18 0446  •  ondansetron (ZOFRAN) injection 4 mg, 4 mg, Intravenous, Q6H PRN, Tarik Wheeler PA  •  oxyCODONE-acetaminophen (PERCOCET) 5-325 MG per tablet 2 tablet, 2 tablet, Oral, Q4H PRN, Shane Bosch MD, 2 tablet at 10/06/18 0515  •  Pharmacy Consult - [facility], , Does not apply, Daily, Chad Hu, [facility], Stopped at 10/01/18 1001  •  potassium & sodium phosphates (PHOS-NAK) 280-160-250 MG packet - for Phosphorus less than 1.25 mg/dL, 2 packet, Oral, Q6H PRN **OR** potassium & sodium phosphates (PHOS-NAK) 280-160-250 MG packet - for Phosphorus 1.25 - 2.5 mg/dL, 2 packet, Oral, Once PRN, Rui Velez MD, 2 packet at 10/05/18 1043  •  sennosides-docusate sodium (SENOKOT-S) 8.6-50 MG tablet 2 tablet, 2 tablet, Oral, BID, Tarik Wheeler PA, 2 tablet at 10/06/18 0820  •  sodium chloride 0.9 % flush 3 mL, 3 mL, Intravenous, Q12H, Tarik Wheeler PA  •  sodium chloride 0.9 % flush 3-10 mL, 3-10 mL, Intravenous, Q8H, Tarik Wheeler PA    History of Present Illness: No acute events overnight. Does complain of pain over site where chest tube was. Denies SOB, CP.   Overall doing ok other than pain at CT site as noted    The following portions of the patient's history were reviewed and updated as appropriate: allergies, current medications, past family history, past medical history, past social history, past surgical history and problem list.    ROS:  A complete ROS obtained and negative except as outlined in problem list, HPI and other parts of the note.    Procedures       Objective:       Vitals:    10/06/18 0500 10/06/18 0600 10/06/18 0738 10/06/18 0821   BP:   111/86 (!) 129/101   BP Location:   Right arm    Patient Position:   Lying    Pulse:  110 105 110   Resp:   20    Temp:   98.6 °F (37 °C)    TempSrc:   Oral    SpO2:        Weight: 74.6 kg (164 lb 6.4 oz)      Height:           Intake/Output Summary (Last 24 hours) at 10/06/18 1051  Last data filed at 10/06/18 0500   Gross per 24 hour   Intake              213 ml   Output             1760 ml   Net            -1547 ml       Physical Exam:  GENERAL: Well-developed, well-nourished patient in no acute distress.  HEENT: Normocephalic, atraumatic, PERRLA. Moist mucous membranes.  NECK: No JVD present at 30°. No carotid bruits auscultated.  LUNGS: Nonlabored. Diminished lower lobes  CARDIOVASCULAR: Regular rate and rhythm. No murmurs, gallops or rubs noted.   ABDOMEN: Soft, non-tender, non-distended. Normoactive bowel sounds.  MUSCULOSKELETAL: No gross deformities. No clubbing or cyanosis  EXT: pulses intact. 1+ pitting edema  SKIN: Pink, warm. Sternotomy incision- edges approximated, no erythema or drainiage  Neuro: No gross neurologic deficits.    The patient's old records including ambulatory rhythm recordings (ECGs, Holter/event monitor) were reviewed and discussed.      Lab Review:     Results from last 7 days  Lab Units 10/06/18  0830 10/05/18  0357 10/04/18  0408   SODIUM mmol/L 139 136 139   POTASSIUM mmol/L 3.6 4.3 5.3   CHLORIDE mmol/L 101 101 104   CO2 mmol/L 27.0 33.0* 27.0   BUN mg/dL 17 16 18   CREATININE mg/dL 0.85 0.89 1.04   GLUCOSE mg/dL 114* 113* 138*   CALCIUM mg/dL 9.2 9.0 9.0           Results from last 7 days  Lab Units 10/06/18  0830 10/05/18  0357 10/04/18  0408   WBC 10*3/mm3 11.95* 12.11* 10.43   HEMOGLOBIN g/dL 10.2* 9.1* 9.4*   HEMATOCRIT % 31.3* 27.0* 28.3*   PLATELETS 10*3/mm3 171 152 145*       Results from last 7 days  Lab Units 10/04/18  0408 10/03/18  2343 10/03/18  1814  10/02/18  2046 10/02/18  1359 10/01/18  0829   INR  1.30* 1.33* 1.31*  < > 1.11* 1.63* 0.98   APTT seconds  --   --   --   --  25.9 27.2 26.1   < > = values in this interval not displayed.        Results from last 7 days  Lab Units 10/06/18  0830   MAGNESIUM mg/dL 1.9               Telemetry: NSR 90's  Assessment & Plan:   Assessment:  CAD, status post CABG ×5, normal EF.  Hypertension, stable  Dyslipidemia, statin DC'd  due to elevated liver enzymes.  Elevated Liver Enzymes     Plan:  1. Increased metoprolol to 25 mg twice a day yesterday. Consider increasing to 50 mg bid depending on BP which has been a little low at times.   2. Increase activities  3. No Statin for now s/t transaminitis   4. Monitor for recovery of liver function.  5. Progressing well       CDANIEL Thomas  [facility]  10/6/2018  10:51 AM      I, Simon Nathan, have reviewed the note in full and agree with all aspects of the above including physical exam, assessment, labs and plan with changes made accordingly. Face to Face Time was spent with the patient.    Simon Nathan DO  10/06/18  1:25 PM

## 2018-10-06 NOTE — CONSULTS
[facility] Medicine Services  CONSULT NOTE      Patient Name: Judah Cerna  : 1955  MRN: [id number]    Primary Care Physician: Justin Casas APRN  Referring Provider: Jose Carlos Burdick MD    Subjective   Subjective     Reason for Consultation:  Medical Management, diabetes    HPI:  Judah Cerna is a 62 y.o. male with PMH significant for CAD, HLD, T2DM, and HTN who presented to [facility] with atypical chest pain and subsequently underwent CABG per Dr. Bosch on 10/2/18.  He was transferred to the floor from ICU on 10/5/18.  Hospitalist Medicine was consulted for medical management. Reports appetite is improving, he ate well today.  He has had regular BMs x 2 days.  He has been ambulating in the hallways today without difficulty.  Denies any shortness of breath.  Chest tubes removed today and sore at this site with deep breaths.       In regards to diabetes, the patient reports he was diagnosed approximately 10 years ago.  He is managed by his PCP and continues to take only oral medications for control.  Reports overall, his blood sugars remain stable.  A1c preoperatively was 6.30.         Review of Systems   Constitutional: Negative for chills and fever.   HENT: Negative for trouble swallowing.    Respiratory: Negative for chest tightness.    Cardiovascular: Negative for chest pain.   Gastrointestinal: Negative for abdominal pain, constipation, nausea and vomiting.     Otherwise 10-system ROS reviewed and is negative except as mentioned in the HPI.      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     Social History:  reports that he has never smoked. He has never used smokeless tobacco. He reports that he does not drink alcohol or use drugs.    Medications:  Prescriptions Prior to Admission   Medication Sig Dispense Refill Last Dose   • ALPRAZolam (XANAX) 0.5 MG tablet Take 0.5 mg by mouth Daily As Needed.   Past Week at Unknown time   • atorvastatin (LIPITOR) 40 MG tablet Take 40 mg by mouth Daily.   9/27/2018 at Unknown time   • diclofenac (VOLTAREN) 50 MG EC tablet Take 1 tablet by mouth 3 (Three) Times a Day. 21 tablet 0 9/28/2018 at Unknown time   • glimepiride (AMARYL) 4 MG tablet Take 4 mg by mouth Every Morning Before Breakfast.   Taking   • HYDROcodone-acetaminophen (NORCO) 5-325 MG per tablet Take 1 tablet by mouth 2 (Two) Times a Day As Needed (Knee Pain).   Past Week at Unknown time   • lisinopril (PRINIVIL,ZESTRIL) 10 MG tablet Take 10 mg by mouth Daily.   9/27/2018 at Unknown time   • metFORMIN (GLUCOPHAGE) 1000 MG tablet Take 1,000 mg by mouth Daily With Breakfast.   9/27/2018 at Unknown time   • nebivolol (BYSTOLIC) 5 MG tablet Take 5 mg by mouth Daily.   9/28/2018 at Unknown time   • nystatin (MYCOSTATIN) 403621 UNIT/ML suspension Swish and swallow 500,000 Units 2 (Two) Times a Day As Needed.   9/27/2018 at Unknown time       No Known Allergies    Objective   Objective     Vital Signs:   Temp:  [98.3 °F (36.8 °C)-98.9 °F (37.2 °C)] 98.6 °F (37 °C)  Heart Rate:  [] 110  Resp:  [18-20] 20  BP: (104-129)/() 129/101     Physical Exam  GENERAL: Well-developed, well-nourished  patient in no acute distress.  HEENT: Normocephalic, atraumatic, PERRLA. Moist mucous membranes.  NECK: No JVD present. No carotid bruits auscultated.  LUNGS: Nonlabored. Diminished lower lobes  CARDIOVASCULAR: Regular rate and rhythm. No murmurs, gallops or rubs noted.   ABDOMEN: Soft, non-tender, non-distended. Normoactive bowel sounds.  MUSCULOSKELETAL: No gross deformities. No clubbing or cyanosis  EXT: pulses intact. 1+ pitting edema  SKIN: Pink, warm. Sternotomy incision- edges approximated, no erythema or drainage, open to air.   Neuro: No gross neurologic deficits.    Results Reviewed:  I have personally reviewed current lab, radiology, and data and agree.      Results from last 7 days  Lab Units 10/06/18  0830  10/04/18  0408   WBC 10*3/mm3 11.95*  < > 10.43   HEMOGLOBIN g/dL 10.2*  < > 9.4*   HEMATOCRIT % 31.3*  < > 28.3*   PLATELETS 10*3/mm3 171  < > 145*   INR   --   --  1.30*   < > = values in this interval not displayed.    Results from last 7 days  Lab Units 10/06/18  0830   SODIUM mmol/L 139   POTASSIUM mmol/L 3.6   CHLORIDE mmol/L 101   CO2 mmol/L 27.0   BUN mg/dL 17   CREATININE mg/dL 0.85   GLUCOSE mg/dL 114*   CALCIUM mg/dL 9.2   ALT (SGPT) U/L 704*   AST (SGOT) U/L 204*     Estimated Creatinine Clearance: 86.1 mL/min (by C-G formula based on SCr of 0.85 mg/dL).  Brief Urine Lab Results  (Last result in the past 365 days)      Color   Clarity   Blood   Leuk Est   Nitrite   Protein   CREAT   Urine HCG        10/01/18 2320 Yellow Clear Negative Negative Negative Negative             No results found for: BNP    Microbiology Results Abnormal     None          Imaging Results (last 24 hours)     Procedure Component Value Units Date/Time    XR Chest 1 View [[id number]] Collected:  10/06/18 1155     Updated:  10/06/18 1349    Narrative:       EXAMINATION: XR CHEST 1 VW - 10/06/2018     INDICATION: I20.0-Unstable angina; I25.110-Atherosclerotic heart disease  of native coronary artery with unstable  angina pectoris; Z74.09-Other  reduced mobility.     TECHNIQUE:  Single view frontal chest.     COMPARISONS: 10/5/2018.     FINDINGS: Right IJ vascular sheath has been pulled.  Sternotomy wires  are again noted. Bilateral pleural effusions and adjacent atelectasis,  trace on the left and small moderate volume on the right. No  pneumothorax. Cardiomediastinal silhouette is unchanged.        Impression:       Essentially stable exam.     DICTATED:   10/06/2018  EDITED/ls :   10/06/2018      This report was finalized on 10/6/2018 1:47 PM by Blu Montes.           Results for orders placed during the hospital encounter of 14   CONVERTED (HISTORICAL) ECHO    Narrative Patient:      REINALDO GAO Rec#:     [id number]               :          1955            Date:         2014            Age:          58y                   Height:       165.1 cm / 65.0 in  Weight:       78.93 kg / 174.0 lbs  Sex:          M                     BSA:          1.86  Room#:        opt                       Sonographer:  Princess Lane, JOSUE COVARRUBIAS, RVT  Referring:    Kay Stevens MD  Reading:      Kay Stevens MD  ______________________________________________________________________    Transthoracic Echocardiogram    Indication:  chest pain; CAD; Abnl EKG  BP:           130/90  Rhythm:       Sinus    Conclusions  1. There is normal left ventricular systolic function.  2. The estimated ejection fraction is greater than 65%.   3. There is mild mitral regurgitation.   4. There is mild tricuspid regurgitation.    Findings       Technical Comments:  The study quality is good.      Left Ventricle:  The left ventricular chamber size is normal. Global left ventricular  wall motion and contractility are within normal limits. There is normal  left ventricular systolic function. The estimated ejection fraction is  greater than 65%.      Left Atrium:  The left atrial chamber size is normal.     Right Ventricle:  The right  ventricular cavity size is normal. The right ventricular  global systolic function is normal.     Right Atrium:  The right atrial cavity size is normal. No atrial septal defect is  visualized.     Aortic Valve:  The aortic valve is trileaflet. Mild aortic cusp sclerosis is present.  There is no evidence of aortic regurgitation. There is no evidence of  aortic stenosis.     Mitral Valve:  The mitral valve leaflets are mildly thickened. There is no evidence of  mitral valve prolapse. There is mild mitral regurgitation.  There is no  evidence of mitral stenosis.     Tricuspid Valve:  The tricuspid valve leaflets are normal.  There is mild tricuspid  regurgitation. The right ventricular systolic pressure is estimated to  be 25-30 mmHg.  No pulmonary hypertension is noted.     Pulmonic Valve:  The pulmonic valve appears normal. There is a trace pulmonic  regurgitation.      Pericardium:  There is no evidence of pericardial effusion.     Aorta:  There is no dilatation of the aortic root.     Pulmonary Artery:  The main pulmonary artery appears normal.     Venous:  The venous system appears normal.     Measurements   Chambers  Name                    Value           RVIDd (AP) MM           1.85 cm         IVSd (MM)               0.92 cm         LVIDd (MM)              5.1 cm          LVIDs (MM)              2.71 cm         LVPWd (MM)              0.88 cm         Ao root diameter (MM)   2.6 cm          LA dimension (AP) MM    3.9 cm          LA:Ao ratio (MM)        1.5 ratio         Volumes  Name                    Value           LV EDV SP 4CH (MOD)     94 ml           LV ESV SP 4CH (MOD)     42 ml           EF SP 4CH (MOD)         55 %            LV EDV SP 2CH (MOD)     83 ml           LV ESV SP 2CH (MOD)     28 ml           EF SP 2CH (MOD)         66 %            LV EDV BP               92 ml           LV ESV BP               35 ml           BP EF (MOD)             62 %              Diastolic/Systolic Function  Name                     Value           MV E-wave Vmax          0.78 m/sec      MV deceleration time    183 msec        MV A-wave Vmax          0.8 m/sec       MV E:A ratio            1 ratio         LV septal e' Vmax       0.09 m/sec      LV lateral e' Vmax      0.1 m/sec       LV E:e' septal ratio    8.6 ratio       LV E:e' lateral ratio   8.2 ratio         Tricuspid Valve  Name                    Value           TR Vmax                 2.65 m/sec      TR peak gradient        28 mmHg           Electronically signed by: Kay Stevens MD on 09/24/2014 18:53:07       Assessment/Plan   Assessment / Plan           Plan:    CAD s/p CABG   - post op management per CTS  - encouraged ambulation and incent. Spir.    HTN, HLD  - management per Cardiology    T2DM  - a1c stable at 6.3%  - continue ac/hs finger sticks with ld ssi.  - will monitor and adjust as needed    Continue stool softeners      Thank you for allowing [facility] [place], operated by [facility] to provide consultative care for your patient, we will continue to follow while clinically appropriate.    Electronically signed by DANIEL Segura, 10/06/18, 3:25 PM.

## 2018-10-07 LAB
ANION GAP SERPL CALCULATED.3IONS-SCNC: 7 MMOL/L (ref 3–11)
BUN BLD-MCNC: 11 MG/DL (ref 9–23)
BUN/CREAT SERPL: 12.4 (ref 7–25)
CALCIUM SPEC-SCNC: 9.4 MG/DL (ref 8.7–10.4)
CHLORIDE SERPL-SCNC: 101 MMOL/L (ref 99–109)
CO2 SERPL-SCNC: 30 MMOL/L (ref 20–31)
CREAT BLD-MCNC: 0.89 MG/DL (ref 0.6–1.3)
GFR SERPL CREATININE-BSD FRML MDRD: 105 ML/MIN/1.73
GLUCOSE BLD-MCNC: 188 MG/DL (ref 70–100)
GLUCOSE BLDC GLUCOMTR-MCNC: 140 MG/DL (ref 70–130)
GLUCOSE BLDC GLUCOMTR-MCNC: 148 MG/DL (ref 70–130)
GLUCOSE BLDC GLUCOMTR-MCNC: 157 MG/DL (ref 70–130)
GLUCOSE BLDC GLUCOMTR-MCNC: 179 MG/DL (ref 70–130)
HCT VFR BLD AUTO: 33.8 % (ref 38.9–50.9)
HGB BLD-MCNC: 11.1 G/DL (ref 13.1–17.5)
POTASSIUM BLD-SCNC: 3.8 MMOL/L (ref 3.5–5.5)
SODIUM BLD-SCNC: 138 MMOL/L (ref 132–146)

## 2018-10-07 PROCEDURE — 99232 SBSQ HOSP IP/OBS MODERATE 35: CPT | Performed by: INTERNAL MEDICINE

## 2018-10-07 PROCEDURE — 85014 HEMATOCRIT: CPT | Performed by: PHYSICIAN ASSISTANT

## 2018-10-07 PROCEDURE — 99232 SBSQ HOSP IP/OBS MODERATE 35: CPT | Performed by: NURSE PRACTITIONER

## 2018-10-07 PROCEDURE — 82962 GLUCOSE BLOOD TEST: CPT

## 2018-10-07 PROCEDURE — 80048 BASIC METABOLIC PNL TOTAL CA: CPT | Performed by: PHYSICIAN ASSISTANT

## 2018-10-07 PROCEDURE — 99024 POSTOP FOLLOW-UP VISIT: CPT | Performed by: THORACIC SURGERY (CARDIOTHORACIC VASCULAR SURGERY)

## 2018-10-07 PROCEDURE — 85018 HEMOGLOBIN: CPT | Performed by: PHYSICIAN ASSISTANT

## 2018-10-07 RX ORDER — CHOLECALCIFEROL (VITAMIN D3) 1250 MCG
50000 CAPSULE ORAL WEEKLY
Qty: 4 CAPSULE | Refills: 0 | Status: SHIPPED | OUTPATIENT
Start: 2018-10-12 | End: 2019-06-28

## 2018-10-07 RX ORDER — CHOLECALCIFEROL (VITAMIN D3) 125 MCG
500 CAPSULE ORAL DAILY
Qty: 30 TABLET | Refills: 0 | Status: SHIPPED | OUTPATIENT
Start: 2018-10-07

## 2018-10-07 RX ORDER — SENNA AND DOCUSATE SODIUM 50; 8.6 MG/1; MG/1
2 TABLET, FILM COATED ORAL 2 TIMES DAILY
Qty: 60 TABLET | Refills: 0 | Status: SHIPPED | OUTPATIENT
Start: 2018-10-07 | End: 2018-11-16

## 2018-10-07 RX ADMIN — OXYCODONE HYDROCHLORIDE AND ACETAMINOPHEN 2 TABLET: 5; 325 TABLET ORAL at 10:28

## 2018-10-07 RX ADMIN — OXYCODONE HYDROCHLORIDE AND ACETAMINOPHEN 2 TABLET: 5; 325 TABLET ORAL at 22:06

## 2018-10-07 RX ADMIN — ASPIRIN 325 MG: 325 TABLET, DELAYED RELEASE ORAL at 09:22

## 2018-10-07 RX ADMIN — OXYCODONE HYDROCHLORIDE AND ACETAMINOPHEN 2 TABLET: 5; 325 TABLET ORAL at 05:55

## 2018-10-07 RX ADMIN — INSULIN LISPRO 2 UNITS: 100 INJECTION, SOLUTION INTRAVENOUS; SUBCUTANEOUS at 12:54

## 2018-10-07 RX ADMIN — METOPROLOL TARTRATE 25 MG: 25 TABLET ORAL at 22:06

## 2018-10-07 RX ADMIN — METOPROLOL TARTRATE 25 MG: 25 TABLET ORAL at 09:22

## 2018-10-07 RX ADMIN — DOCUSATE SODIUM AND SENNOSIDES 2 TABLET: 50; 8.6 TABLET ORAL at 09:22

## 2018-10-07 RX ADMIN — Medication 3 ML: at 09:24

## 2018-10-07 RX ADMIN — INSULIN LISPRO 2 UNITS: 100 INJECTION, SOLUTION INTRAVENOUS; SUBCUTANEOUS at 22:07

## 2018-10-07 RX ADMIN — OXYCODONE HYDROCHLORIDE AND ACETAMINOPHEN 2 TABLET: 5; 325 TABLET ORAL at 14:49

## 2018-10-07 RX ADMIN — OXYCODONE HYDROCHLORIDE AND ACETAMINOPHEN 2 TABLET: 5; 325 TABLET ORAL at 18:46

## 2018-10-07 RX ADMIN — ALPRAZOLAM 0.5 MG: 0.5 TABLET ORAL at 22:06

## 2018-10-07 RX ADMIN — DOCUSATE SODIUM AND SENNOSIDES 2 TABLET: 50; 8.6 TABLET ORAL at 22:06

## 2018-10-07 NOTE — PROGRESS NOTES
[facility] at [facility]  Cardiovascular Consultation Note  Judah Cerna  S446/1  1955    DATE OF ADMISSION: 9/28/2018  DATE OF FOLLOW UP: 10/07/18    Justin Casas APRN    Subjective:     Patient ID: Judah Cerna is a 62 y.o. male.    Chief Complaint: HTN, CAD  Chief Complaint   Patient presents with   • Chest Pain       Allergies:   No Known Allergies     Current medications:    Current Facility-Administered Medications:   •  acetaminophen (TYLENOL) tablet 650 mg, 650 mg, Oral, Q4H PRN, Shane Bosch MD, 650 mg at 10/03/18 0014  •  ALPRAZolam (XANAX) tablet 0.5 mg, 0.5 mg, Oral, BID PRN, Nadir Tanner MD, 0.5 mg at 10/06/18 2048  •  aspirin EC tablet 325 mg, 325 mg, Oral, Daily, Tarik Wheeler PA, 325 mg at 10/07/18 0922  •  bisacodyl (DULCOLAX) EC tablet 10 mg, 10 mg, Oral, Daily PRN, Tarik Wheeler PA  •  cholecalciferol (VITAMIN D3) capsule 50,000 Units, 50,000 Units, Oral, Weekly, Rui Velez MD, 50,000 Units at 10/05/18 1201  •  cyanocobalamin injection 1,000 mcg, 1,000 mcg, Intramuscular, Weekly, Rui Velez MD, 1,000 mcg at 10/04/18 1200  •  docusate sodium (COLACE) capsule 100 mg, 100 mg, Oral, BID PRN, Tarik Wheeler PA, 100 mg at 10/04/18 0840  •  HYDROcodone-acetaminophen (NORCO) 7.5-325 MG per tablet 1 tablet, 1 tablet, Oral, Q4H PRN, Shane Bosch MD, 1 tablet at 10/06/18 2048  •  insulin lispro (humaLOG) injection 0-9 Units, 0-9 Units, Subcutaneous, 4x Daily With Meals & Nightly, Rui Velez MD, 2 Units at 10/06/18 2048  •  ipratropium-albuterol (DUO-NEB) nebulizer solution 3 mL, 3 mL, Nebulization, Q4H PRN, Bryan Kirk PA  •  metoprolol tartrate (LOPRESSOR) tablet 25 mg, 25 mg, Oral, Q12H, Yasmeen Hubbard APRN, 25 mg at 10/07/18 0922  •  Morphine sulfate (PF) injection 2 mg, 2 mg, Intravenous, Q30 Min PRN, Shane Bosch MD, 2 mg at 10/04/18 0446  •  ondansetron (ZOFRAN)  injection 4 mg, 4 mg, Intravenous, Q6H PRN, Tarik Wheeler PA  •  oxyCODONE-acetaminophen (PERCOCET) 5-325 MG per tablet 2 tablet, 2 tablet, Oral, Q4H PRN, Shane Bosch MD, 2 tablet at 10/07/18 1028  •  Pharmacy Consult - [facility], , Does not apply, Daily, Chad Hu, [facility], Stopped at 10/01/18 1001  •  potassium & sodium phosphates (PHOS-NAK) 280-160-250 MG packet - for Phosphorus less than 1.25 mg/dL, 2 packet, Oral, Q6H PRN **OR** potassium & sodium phosphates (PHOS-NAK) 280-160-250 MG packet - for Phosphorus 1.25 - 2.5 mg/dL, 2 packet, Oral, Once PRN, Rui Velez MD, 2 packet at 10/06/18 1110  •  sennosides-docusate sodium (SENOKOT-S) 8.6-50 MG tablet 2 tablet, 2 tablet, Oral, BID, Tarik Wheeler PA, 2 tablet at 10/07/18 0922  •  sodium chloride 0.9 % flush 3 mL, 3 mL, Intravenous, Q12H, Tarik Wheeler PA, 3 mL at 10/07/18 0924  •  sodium chloride 0.9 % flush 3-10 mL, 3-10 mL, Intravenous, Q8H, Tarik Wheeler PA    History of Present Illness: No acute events overnight. Doing better today but still with pain. Ambulating and using IS.   The following portions of the patient's history were reviewed and updated as appropriate: allergies, current medications, past family history, past medical history, past social history, past surgical history and problem list.    Review of Systems   Cardiovascular: Positive for chest pain.   :  A complete ROS obtained and negative except as outlined in problem list, HPI and other parts of the note.    Procedures       Objective:       Vitals:    10/07/18 0439 10/07/18 0600 10/07/18 0700 10/07/18 0922   BP:   118/81 115/76   BP Location:   Right arm    Patient Position:   Lying    Pulse:  100 96 106   Resp:   18    Temp:   98.1 °F (36.7 °C)    TempSrc:   Oral    SpO2:  97%     Weight: 74.1 kg (163 lb 6.4 oz)      Height:           Intake/Output Summary (Last 24 hours) at 10/07/18 1216  Last data filed at 10/07/18 0900   Gross per 24 hour    Intake              480 ml   Output             1150 ml   Net             -670 ml       Physical Exam:  GENERAL: Well-developed, well-nourished patient in no acute distress.  HEENT: Normocephalic, atraumatic, PERRLA. Moist mucous membranes.  NECK: No JVD present at 30°. No carotid bruits auscultated.  LUNGS: Nonlabored. Diminished lower lobes  CARDIOVASCULAR: Regular rate and rhythm. At times tachy No murmurs, gallops or rubs noted.   ABDOMEN: Soft, non-tender, non-distended. Normoactive bowel sounds.  MUSCULOSKELETAL: No gross deformities. No clubbing or cyanosis  EXT: pulses intact. 1+ pitting edema  SKIN: Pink, warm. Sternotomy incision- edges approximated, no erythema or drainiage  Neuro: No gross neurologic deficits.    The patient's old records including ambulatory rhythm recordings (ECGs, Holter/event monitor) were reviewed and discussed.      Lab Review:     Results from last 7 days  Lab Units 10/07/18  1015 10/06/18  0830 10/05/18  0357   SODIUM mmol/L 138 139 136   POTASSIUM mmol/L 3.8 3.6 4.3   CHLORIDE mmol/L 101 101 101   CO2 mmol/L 30.0 27.0 33.0*   BUN mg/dL 11 17 16   CREATININE mg/dL 0.89 0.85 0.89   GLUCOSE mg/dL 188* 114* 113*   CALCIUM mg/dL 9.4 9.2 9.0           Results from last 7 days  Lab Units 10/07/18  0820 10/06/18  0830 10/05/18  0357 10/04/18  0408   WBC 10*3/mm3  --  11.95* 12.11* 10.43   HEMOGLOBIN g/dL 11.1* 10.2* 9.1* 9.4*   HEMATOCRIT % 33.8* 31.3* 27.0* 28.3*   PLATELETS 10*3/mm3  --  171 152 145*       Results from last 7 days  Lab Units 10/04/18  0408 10/03/18  2343 10/03/18  1814  10/02/18  2046 10/02/18  1359 10/01/18  0829   INR  1.30* 1.33* 1.31*  < > 1.11* 1.63* 0.98   APTT seconds  --   --   --   --  25.9 27.2 26.1   < > = values in this interval not displayed.        Results from last 7 days  Lab Units 10/06/18  0830   MAGNESIUM mg/dL 1.9              Telemetry: NSR 90's  Assessment & Plan:   Assessment:  CAD, status post CABG ×5, normal EF.  Hypertension,  stable  Dyslipidemia, statin DC'd  due to elevated liver enzymes.  Elevated Liver Enzymes     Plan:  1. Increased metoprolol to 25 mg twice a day. Consider increasing to 50 mg bid depending on BP which has been a little low at times.   2. Increase activities  3. No Statin for now s/t transaminitis   4. Monitor for recovery of liver function.  5. Progressing well overall       Simon Nathan,   10/07/18  12:16 PM

## 2018-10-07 NOTE — PROGRESS NOTES
[facility] Medicine Services  PROGRESS NOTE    Patient Name: Judah Cerna  : 1955  MRN: [id number]    Date of Admission: 2018  Length of Stay: 6  Primary Care Physician: Justin Casas APRN    Subjective   Subjective   CC:  Medical management for diabetes    HPI:  Patient sitting up in chair eating lunch in NAD.  States he's ready to go home.  +BM.  Family in room.  No adverse events.    Review of Systems  Gen- No fevers, chills  CV- No chest pain, palpitations  Resp- No cough, dyspnea  GI- No N/V/D, abd pain  Otherwise ROS is negative except as mentioned in the HPI.    Objective   Objective   Vital Signs:   Temp:  [98.1 °F (36.7 °C)-98.9 °F (37.2 °C)] 98.1 °F (36.7 °C)  Heart Rate:  [] 106  Resp:  [18-20] 18  BP: (115-120)/(70-81) 115/76  Physical Exam:  Constitutional: No acute distress, awake, alert  Head:  NCAT  ENT: pink, mucous membranes moist  Respiratory: Clear to auscultation bilaterally, respiratory effort normal   Cardiovascular: RRR, no murmurs, rubs, or gallops, palpable pedal pulses bilaterally  Gastrointestinal: Positive bowel sounds, soft, nontender, nondistended  Musculoskeletal: Trace pitting edema bilaterally  Neurologic: Oriented x 3, strength symmetric in all extremities, Cranial Nerves grossly intact to confrontation, speech clear  Skin: No rashes  Psychiatric: Appropriate affect, cooperative    Results Reviewed:  I have personally reviewed current lab, radiology, and data and agree.    Results from last 7 days  Lab Units 10/07/18  0820 10/06/18  0830 10/05/18  0357 10/04/18  0408 10/03/18  2343 10/03/18  1814   WBC 10*3/mm3  --  11.95* 12.11* 10.43 9.70 9.55   HEMOGLOBIN g/dL 11.1* 10.2* 9.1* 9.4* 9.4* 8.8*   HEMATOCRIT % 33.8* 31.3* 27.0* 28.3* 28.5* 26.5*   PLATELETS 10*3/mm3  --  171 152 145* 143* 148*   INR   --   --   --  1.30* 1.33* 1.31*       Results from last 7 days  Lab Units 10/07/18  1015 10/06/18  0830 10/05/18  0357  10/04/18  0408   SODIUM mmol/L 138 139 136 139   POTASSIUM mmol/L 3.8 3.6 4.3 5.3   CHLORIDE mmol/L 101 101 101 104   CO2 mmol/L 30.0 27.0 33.0* 27.0   BUN mg/dL 11 17 16 18   CREATININE mg/dL 0.89 0.85 0.89 1.04   GLUCOSE mg/dL 188* 114* 113* 138*   CALCIUM mg/dL 9.4 9.2 9.0 9.0   ALT (SGPT) U/L  --  704* 928* 723*   AST (SGOT) U/L  --  204* 436* 388*     Estimated Creatinine Clearance: 82 mL/min (by C-G formula based on SCr of 0.89 mg/dL).  No results found for: BNP    Microbiology Results Abnormal     None        Imaging Results (last 24 hours)     Procedure Component Value Units Date/Time    XR Chest 1 View [[id number]] Collected:  10/06/18 1155     Updated:  10/06/18 1349    Narrative:       EXAMINATION: XR CHEST 1 VW - 10/06/2018     INDICATION: I20.0-Unstable angina; I25.110-Atherosclerotic heart disease  of native coronary artery with unstable angina pectoris; Z74.09-Other  reduced mobility.     TECHNIQUE:  Single view frontal chest.     COMPARISONS: 10/5/2018.     FINDINGS: Right IJ vascular sheath has been pulled.  Sternotomy wires  are again noted. Bilateral pleural effusions and adjacent atelectasis,  trace on the left and small moderate volume on the right. No  pneumothorax. Cardiomediastinal silhouette is unchanged.        Impression:       Essentially stable exam.     DICTATED:   10/06/2018  EDITED/ls :   10/06/2018      This report was finalized on 10/6/2018 1:47 PM by Blu Montes.           Results for orders placed during the hospital encounter of 14   CONVERTED (HISTORICAL) ECHO    Narrative Patient:      REINALDO GAO    [facility] Rec#:     [id number]               :          1955            Date:         2014            Age:          58y                   Height:       165.1 cm / 65.0 in  Weight:       78.93 kg / 174.0 lbs  Sex:          M                     BSA:          1.86  Room#:        opt                       Sonographer:  Princess Lane FASE, RDCS, RVT  Referring:     Kay Stevens MD  Reading:      Kay Stevens MD  ______________________________________________________________________    Transthoracic Echocardiogram    Indication:  chest pain; CAD; Abnl EKG  BP:           130/90  Rhythm:       Sinus    Conclusions  1. There is normal left ventricular systolic function.  2. The estimated ejection fraction is greater than 65%.   3. There is mild mitral regurgitation.   4. There is mild tricuspid regurgitation.    Findings       Technical Comments:  The study quality is good.      Left Ventricle:  The left ventricular chamber size is normal. Global left ventricular  wall motion and contractility are within normal limits. There is normal  left ventricular systolic function. The estimated ejection fraction is  greater than 65%.      Left Atrium:  The left atrial chamber size is normal.     Right Ventricle:  The right ventricular cavity size is normal. The right ventricular  global systolic function is normal.     Right Atrium:  The right atrial cavity size is normal. No atrial septal defect is  visualized.     Aortic Valve:  The aortic valve is trileaflet. Mild aortic cusp sclerosis is present.  There is no evidence of aortic regurgitation. There is no evidence of  aortic stenosis.     Mitral Valve:  The mitral valve leaflets are mildly thickened. There is no evidence of  mitral valve prolapse. There is mild mitral regurgitation.  There is no  evidence of mitral stenosis.     Tricuspid Valve:  The tricuspid valve leaflets are normal.  There is mild tricuspid  regurgitation. The right ventricular systolic pressure is estimated to  be 25-30 mmHg.  No pulmonary hypertension is noted.     Pulmonic Valve:  The pulmonic valve appears normal. There is a trace pulmonic  regurgitation.      Pericardium:  There is no evidence of pericardial effusion.     Aorta:  There is no dilatation of the aortic root.     Pulmonary Artery:  The main pulmonary artery appears normal.     Venous:  The venous  system appears normal.     Measurements   Chambers  Name                    Value           RVIDd (AP) MM           1.85 cm         IVSd (MM)               0.92 cm         LVIDd (MM)              5.1 cm          LVIDs (MM)              2.71 cm         LVPWd (MM)              0.88 cm         Ao root diameter (MM)   2.6 cm          LA dimension (AP) MM    3.9 cm          LA:Ao ratio (MM)        1.5 ratio         Volumes  Name                    Value           LV EDV SP 4CH (MOD)     94 ml           LV ESV SP 4CH (MOD)     42 ml           EF SP 4CH (MOD)         55 %            LV EDV SP 2CH (MOD)     83 ml           LV ESV SP 2CH (MOD)     28 ml           EF SP 2CH (MOD)         66 %            LV EDV BP               92 ml           LV ESV BP               35 ml           BP EF (MOD)             62 %              Diastolic/Systolic Function  Name                    Value           MV E-wave Vmax          0.78 m/sec      MV deceleration time    183 msec        MV A-wave Vmax          0.8 m/sec       MV E:A ratio            1 ratio         LV septal e' Vmax       0.09 m/sec      LV lateral e' Vmax      0.1 m/sec       LV E:e' septal ratio    8.6 ratio       LV E:e' lateral ratio   8.2 ratio         Tricuspid Valve  Name                    Value           TR Vmax                 2.65 m/sec      TR peak gradient        28 mmHg           Electronically signed by: Kay Stevens MD on 09/24/2014 18:53:07     I have reviewed the medications.    aspirin 325 mg Oral Daily   cholecalciferol 50,000 Units Oral Weekly   cyanocobalamin 1,000 mcg Intramuscular Weekly   insulin lispro 0-9 Units Subcutaneous 4x Daily With Meals & Nightly   metoprolol tartrate 25 mg Oral Q12H   pharmacy consult - MTM  Does not apply Daily   sennosides-docusate sodium 2 tablet Oral BID   sodium chloride 3 mL Intravenous Q12H   sodium chloride 3-10 mL Intravenous Q8H     Assessment/Plan   Assessment / Plan     Brief Hospital Course to date:  Judah L  Nehemiah is a 62 y.o. male with PMH significant for CAD, HLD, T2DM, and HTN who presented to [facility] with atypical chest pain and subsequently underwent CABG per Dr. Bosch on 10/2/18.  He was transferred to the floor from ICU on 10/5/18.  Hospitalist Medicine was consulted for medical management.    Plan:  CAD s/p CABG   - post op management per CTS  - encouraged ambulation and incent. Spir.     HTN, HLD  - management per Cardiology     T2DM  - a1c stable at 6.3%  - continue ac/hs finger sticks with ld ssi.  - will monitor and adjust as needed     Continue stool softeners     DVT Prophylaxis:  Per CTS    OK with Hospital Medicine to discharge patient; follow up with PCP 1 week after discharge to follow A1c 6.3; med rec completed    CODE STATUS:   Code Status and Medical Interventions:   Ordered at: 09/28/18 1802     Code Status:    CPR     Medical Interventions (Level of Support Prior to Arrest):    Full     Disposition: I expect the patient to be discharged per CTS    Thank you for allowing [facility] Medicine Service to provide consultative care for your patient, we will continue to follow while clinically appropriate.    Electronically signed by DANIEL Caputo, 10/07/18, 12:35 PM.

## 2018-10-07 NOTE — PROGRESS NOTES
5 Days Post-Op       LOS: 6 days   Patient Care Team:  Justin Casas APRN as PCP - General (Family Medicine)    Chief complaint: Coronary artery disease    Subjective   Denies chest pain, denies shortness of breath    Objective    Vital Signs  Temp:  [98.6 °F (37 °C)-98.9 °F (37.2 °C)] 98.9 °F (37.2 °C)  Heart Rate:  [] 90  Resp:  [20] 20  BP: (111-129)/() 120/70    Physical Exam:   General Appearance: alert, appears stated age and cooperative   Lungs: clear bilaterally   Heart: Regular rate and rhythm   Skin:  Incision c/d/i   .  Abdomen some mild tenderness right upper quadrant  Results     Results from last 7 days  Lab Units 10/06/18  0830   WBC 10*3/mm3 11.95*   HEMOGLOBIN g/dL 10.2*   HEMATOCRIT % 31.3*   PLATELETS 10*3/mm3 171       Results from last 7 days  Lab Units 10/06/18  0830   SODIUM mmol/L 139   POTASSIUM mmol/L 3.6   CHLORIDE mmol/L 101   CO2 mmol/L 27.0   BUN mg/dL 17   CREATININE mg/dL 0.85   GLUCOSE mg/dL 114*   CALCIUM mg/dL 9.2               Assessment  Coronary artery disease  Active Problems:    * No active hospital problems. *  Right lower chest and right upper quadrant discomfort  Elevated liver enzymes.  Lipitor on hold  Up for quadrant abdominal pain improved  Plan   .  Continue current treatment , and mobilization   He has some discomfort in her right lower chest yesterday.  He also has small right pleural effusion which may contribute to this. ,  However, he did have elevated liver enzymes needs to be reevaluated.  For possible GI reasons  .  He'll continue with aspirin, beta blockers, Lipitor on hold    Chivo Hamilton PA-C  10/07/18  3:37 AM

## 2018-10-07 NOTE — PLAN OF CARE
Problem: Patient Care Overview  Goal: Plan of Care Review  Outcome: Ongoing (interventions implemented as appropriate)   10/07/18 0218   Coping/Psychosocial   Plan of Care Reviewed With patient   Plan of Care Review   Progress improving   OTHER   Outcome Summary vss, pain controlled with oral meds, continues to progress will continue to montor   Coping/Psychosocial   Patient Agreement with Plan of Care agrees       Problem: Cardiac Surgery (Adult)  Goal: Signs and Symptoms of Listed Potential Problems Will be Absent, Minimized or Managed (Cardiac Surgery)  Outcome: Ongoing (interventions implemented as appropriate)

## 2018-10-08 LAB
ALBUMIN SERPL-MCNC: 3.85 G/DL (ref 3.2–4.8)
ALP SERPL-CCNC: 99 U/L (ref 25–100)
ALT SERPL W P-5'-P-CCNC: 266 U/L (ref 7–40)
AST SERPL-CCNC: 50 U/L (ref 0–33)
BILIRUB CONJ SERPL-MCNC: 0.8 MG/DL (ref 0–0.2)
BILIRUB INDIRECT SERPL-MCNC: 0.8 MG/DL (ref 0.1–1.1)
BILIRUB SERPL-MCNC: 1.6 MG/DL (ref 0.3–1.2)
GLUCOSE BLDC GLUCOMTR-MCNC: 116 MG/DL (ref 70–130)
GLUCOSE BLDC GLUCOMTR-MCNC: 117 MG/DL (ref 70–130)
GLUCOSE BLDC GLUCOMTR-MCNC: 149 MG/DL (ref 70–130)
GLUCOSE BLDC GLUCOMTR-MCNC: 153 MG/DL (ref 70–130)
HAV IGM SERPL QL IA: NORMAL
HBV CORE IGM SERPL QL IA: NORMAL
HBV SURFACE AG SERPL QL IA: NORMAL
HCV AB SER DONR QL: NORMAL
PROT SERPL-MCNC: 6.1 G/DL (ref 5.7–8.2)

## 2018-10-08 PROCEDURE — 99232 SBSQ HOSP IP/OBS MODERATE 35: CPT | Performed by: PHYSICIAN ASSISTANT

## 2018-10-08 PROCEDURE — 99024 POSTOP FOLLOW-UP VISIT: CPT | Performed by: THORACIC SURGERY (CARDIOTHORACIC VASCULAR SURGERY)

## 2018-10-08 PROCEDURE — 80074 ACUTE HEPATITIS PANEL: CPT | Performed by: PHYSICIAN ASSISTANT

## 2018-10-08 PROCEDURE — 99253 IP/OBS CNSLTJ NEW/EST LOW 45: CPT | Performed by: PHYSICIAN ASSISTANT

## 2018-10-08 PROCEDURE — 82962 GLUCOSE BLOOD TEST: CPT

## 2018-10-08 PROCEDURE — 80076 HEPATIC FUNCTION PANEL: CPT | Performed by: PHYSICIAN ASSISTANT

## 2018-10-08 PROCEDURE — 99024 POSTOP FOLLOW-UP VISIT: CPT | Performed by: PHYSICIAN ASSISTANT

## 2018-10-08 RX ADMIN — INSULIN LISPRO 2 UNITS: 100 INJECTION, SOLUTION INTRAVENOUS; SUBCUTANEOUS at 11:21

## 2018-10-08 RX ADMIN — OXYCODONE HYDROCHLORIDE AND ACETAMINOPHEN 2 TABLET: 5; 325 TABLET ORAL at 11:13

## 2018-10-08 RX ADMIN — Medication 3 ML: at 08:47

## 2018-10-08 RX ADMIN — OXYCODONE HYDROCHLORIDE AND ACETAMINOPHEN 2 TABLET: 5; 325 TABLET ORAL at 15:50

## 2018-10-08 RX ADMIN — METOPROLOL TARTRATE 25 MG: 25 TABLET ORAL at 19:37

## 2018-10-08 RX ADMIN — ALPRAZOLAM 0.5 MG: 0.5 TABLET ORAL at 19:37

## 2018-10-08 RX ADMIN — OXYCODONE HYDROCHLORIDE AND ACETAMINOPHEN 2 TABLET: 5; 325 TABLET ORAL at 06:20

## 2018-10-08 RX ADMIN — HYDROCODONE BITARTRATE AND ACETAMINOPHEN 1 TABLET: 7.5; 325 TABLET ORAL at 22:45

## 2018-10-08 RX ADMIN — ASPIRIN 325 MG: 325 TABLET, DELAYED RELEASE ORAL at 08:45

## 2018-10-08 RX ADMIN — OXYCODONE HYDROCHLORIDE AND ACETAMINOPHEN 2 TABLET: 5; 325 TABLET ORAL at 19:37

## 2018-10-08 RX ADMIN — DOCUSATE SODIUM AND SENNOSIDES 2 TABLET: 50; 8.6 TABLET ORAL at 19:37

## 2018-10-08 RX ADMIN — DOCUSATE SODIUM AND SENNOSIDES 2 TABLET: 50; 8.6 TABLET ORAL at 08:45

## 2018-10-08 RX ADMIN — OXYCODONE HYDROCHLORIDE AND ACETAMINOPHEN 2 TABLET: 5; 325 TABLET ORAL at 23:45

## 2018-10-08 RX ADMIN — METOPROLOL TARTRATE 25 MG: 25 TABLET ORAL at 08:45

## 2018-10-08 NOTE — PROGRESS NOTES
"[facility]  IP Progress Note   LOS: 7 days   Patient Care Team:  Justin Casas APRN as PCP - General (Family Medicine)    Chief Complaint: Follow up for Coronary Artery Disease    Subjective Denies Chest Pain Denies Dyspnea Eating OK Ambulating          Tele: Sinus Rythym    Vitals:  Blood pressure 131/74, pulse 106, temperature 98.1 °F (36.7 °C), temperature source Oral, resp. rate 16, height 165.1 cm (65\"), weight 73.2 kg (161 lb 6.4 oz), SpO2 96 %.     Intake/Output Summary (Last 24 hours) at 10/08/18 0920  Last data filed at 10/08/18 0900   Gross per 24 hour   Intake             1440 ml   Output              900 ml   Net              540 ml       Physical Exam:      General: alert, no acute distress, acyanotic, well developed, well nourished   Chest: Clear Auscultation and No Wheezes   CV: Heart sounds are normal.  Regular rate and rhythm without murmur, gallop or rub.   Extremities: negative    Results Review:     I reviewed the patient's new clinical results.      Results from last 7 days  Lab Units 10/07/18  0820 10/06/18  0830   WBC 10*3/mm3  --  11.95*   HEMOGLOBIN g/dL 11.1* 10.2*   HEMATOCRIT % 33.8* 31.3*   PLATELETS 10*3/mm3  --  171       Results from last 7 days  Lab Units 10/07/18  1015 10/06/18  0830   SODIUM mmol/L 138 139   POTASSIUM mmol/L 3.8 3.6   CHLORIDE mmol/L 101 101   CO2 mmol/L 30.0 27.0   BUN mg/dL 11 17   CREATININE mg/dL 0.89 0.85   CALCIUM mg/dL 9.4 9.2   BILIRUBIN mg/dL  --  2.7*   ALK PHOS U/L  --  89   ALT (SGPT) U/L  --  704*   AST (SGOT) U/L  --  204*   GLUCOSE mg/dL 188* 114*       Scheduled Meds:  aspirin 325 mg Oral Daily   cholecalciferol 50,000 Units Oral Weekly   cyanocobalamin 1,000 mcg Intramuscular Weekly   insulin lispro 0-9 Units Subcutaneous 4x Daily With Meals & Nightly   metoprolol tartrate 25 mg Oral Q12H   pharmacy consult - MT  Does not apply Daily   sennosides-docusate sodium 2 tablet Oral BID   sodium chloride 3 mL " Intravenous Q12H   sodium chloride 3-10 mL Intravenous Q8H       Assessment:  CAD, status post CABG ×5, normal EF. POD #6  Hypertension, stable  Dyslipidemia, statin DC'd  due to elevated liver enzymes.  Elevated Liver Enzymes     Plan:  1. Increasing Lopressor to 50 mg bid for tachycardia.   2. Increase activities  3. No Statin for now s/t transaminitis   4. Monitor for recovery of liver function. Recheck CMP in am  5. Progressing well overall           GREGG Amaya  10/08/18  9:20 AM

## 2018-10-08 NOTE — PROGRESS NOTES
Continued Stay Note  [facility]     Patient Name: Judah Cerna  MRN: [id number]  Today's Date: 10/8/2018    Admit Date: 9/28/2018          Discharge Plan     Row Name 10/08/18 6156       Plan    Plan HOME    Patient/Family in Agreement with Plan yes    Plan Comments Met with pt at bedside to f/u DCP.  Walking well.  On RA.  No immediate CM needs identified/voiced.  Plan remains to return home upon DC.      Final Discharge Disposition Code 01 - home or self-care              Discharge Codes    No documentation.       Expected Discharge Date and Time     Expected Discharge Date Expected Discharge Time    Oct 5, 2018             Elise Castro

## 2018-10-08 NOTE — PROGRESS NOTES
CTS Progress Note      POD 6 s/p CABG x5     LOS: 7 days     Subjective  No acute events overnight.  On room air.  VSS.  Denies shortness of breath.  Reports RUQ pain has improved.    Objective    Vital Signs  Temp:  [97.1 °F (36.2 °C)] 97.1 °F (36.2 °C)  Heart Rate:  [] 94  Resp:  [20] 20  BP: (115-142)/(76-84) 142/84    Physical Exam:   General Appearance: alert, appears stated age and cooperative   Lungs: clear to auscultation     Heart: regular rhythm & normal rate, normal S1, S2 and no murmur, no gallop, no rub   Abdomen:  Soft, mild tenderness to palpation in RUQ   Chest: sternum stable   Skin: Incision c/d/i     Results     Results from last 7 days  Lab Units 10/07/18  0820 10/06/18  0830   WBC 10*3/mm3  --  11.95*   HEMOGLOBIN g/dL 11.1* 10.2*   HEMATOCRIT % 33.8* 31.3*   PLATELETS 10*3/mm3  --  171       Results from last 7 days  Lab Units 10/07/18  1015   SODIUM mmol/L 138   POTASSIUM mmol/L 3.8   CHLORIDE mmol/L 101   CO2 mmol/L 30.0   BUN mg/dL 11   CREATININE mg/dL 0.89   GLUCOSE mg/dL 188*   CALCIUM mg/dL 9.4       Imaging Results (last 24 hours)     ** No results found for the last 24 hours. **          Assessment  Active Problems:    * No active hospital problems. *      Plan   Lipitor on hold for elevated liver enzymes  Ambulate  Pul toilet    Milla Wilson PA-C  10/08/18  8:12 AM       Status as noted above.  Right upper quadrant pain over the weekend with elevated liver function (AST and ALT).  GI consult in progress.  Delay discharge until we have a better idea about liver function. I have reviewed, verified, and confirmed the above history and current status.  I have examined the patient and confirmed the above physical findings.Above plan and treatment regimen discussed in detail with patient.  Options of treatment, attendant risks vs benefits, and my recommendations were discussed and all questions answered.    Shane Bosch MD  CTSurgery  10/08/18   5:18 PM

## 2018-10-08 NOTE — PLAN OF CARE
Problem: Patient Care Overview  Goal: Plan of Care Review  Outcome: Ongoing (interventions implemented as appropriate)   10/08/18 0355   Coping/Psychosocial   Plan of Care Reviewed With patient   Plan of Care Review   Progress improving   OTHER   Outcome Summary vss, continue to monitor   Coping/Psychosocial   Patient Agreement with Plan of Care agrees       Problem: Cardiac Surgery (Adult)  Goal: Signs and Symptoms of Listed Potential Problems Will be Absent, Minimized or Managed (Cardiac Surgery)  Outcome: Ongoing (interventions implemented as appropriate)

## 2018-10-08 NOTE — CONSULTS
[facility] [place] [facility]ology Consult    Referring Provider: Jose Carlos Burdick MD    PCP: Justin Casas APRN    Reason for Consultation: Elevated LFTs    Chief complaint: Chest pain    History of present illness:    Judah Cerna is a 62 y.o. male who is admitted with chest pain and subsequently found to have three vessel coronary artery disease and underwent CABG on 10/2/18.  He tolerated this well.  He was noted on POD 3 to have elevated transaminases (, ) and hyperbilirubinemia (Bilirubin 2.7).   He reports mild right upper quadrant discomfort but denies postprandial pain, nausea nor vomiting.  He is eating well.  He had a normal bowel movement.  Denies history of liver disease.      Allergies:  Patient has no known allergies.    Scheduled Meds:    aspirin 325 mg Oral Daily   cholecalciferol 50,000 Units Oral Weekly   cyanocobalamin 1,000 mcg Intramuscular Weekly   insulin lispro 0-9 Units Subcutaneous 4x Daily With Meals & Nightly   metoprolol tartrate 25 mg Oral Q12H   pharmacy consult - MTM  Does not apply Daily   sennosides-docusate sodium 2 tablet Oral BID   sodium chloride 3 mL Intravenous Q12H   sodium chloride 3-10 mL Intravenous Q8H        Infusions:       PRN Meds:  •  acetaminophen  •  ALPRAZolam  •  bisacodyl  •  docusate sodium  •  HYDROcodone-acetaminophen  •  ipratropium-albuterol  •  Morphine  •  ondansetron  •  oxyCODONE-acetaminophen  •  potassium & sodium phosphates **OR** potassium & sodium phosphates    Home Meds:  Prescriptions Prior to Admission   Medication Sig Dispense Refill Last Dose   • ALPRAZolam (XANAX) 0.5 MG tablet Take 0.5 mg by mouth Daily As Needed.   Past Week at Unknown time   • atorvastatin (LIPITOR) 40 MG tablet Take 40 mg by mouth Daily.   9/27/2018 at Unknown time   • diclofenac (VOLTAREN) 50 MG EC tablet Take 1 tablet by mouth 3 (Three) Times a Day. 21 tablet 0 9/28/2018 at Unknown time   • glimepiride (AMARYL) 4 MG tablet Take 4 mg by mouth Every  Morning Before Breakfast.   Taking   • HYDROcodone-acetaminophen (NORCO) 5-325 MG per tablet Take 1 tablet by mouth 2 (Two) Times a Day As Needed (Knee Pain).   Past Week at Unknown time   • lisinopril (PRINIVIL,ZESTRIL) 10 MG tablet Take 10 mg by mouth Daily.   9/27/2018 at Unknown time   • metFORMIN (GLUCOPHAGE) 1000 MG tablet Take 1,000 mg by mouth Daily With Breakfast.   9/27/2018 at Unknown time   • nebivolol (BYSTOLIC) 5 MG tablet Take 5 mg by mouth Daily.   9/28/2018 at Unknown time   • nystatin (MYCOSTATIN) 512893 UNIT/ML suspension Swish and swallow 500,000 Units 2 (Two) Times a Day As Needed.   9/27/2018 at Unknown time       ROS: Review of Systems   Constitutional: Positive for fatigue. Negative for appetite change.   HENT: Positive for sinus pressure.    Eyes: Negative.    Respiratory: Positive for chest tightness.    Cardiovascular: Positive for chest pain.   Gastrointestinal: Negative for constipation, diarrhea, nausea and vomiting.   Endocrine: Negative.    Genitourinary: Negative.    Musculoskeletal: Negative.    Skin: Negative.    Neurological: Negative.    Hematological: Negative.    Psychiatric/Behavioral: Negative.        PAST MED HX:  Past Medical History:   Diagnosis Date   • CAD (coronary artery disease)    • Dyslipidemia    • Hypertension    • Type 2 diabetes mellitus (CMS/HCC)        PAST SURG HX:  Past Surgical History:   Procedure Laterality Date   • APPENDECTOMY      w/ partial bowel resection due to ruptured appendix   • CARDIAC CATHETERIZATION     • CARDIAC CATHETERIZATION N/A 9/29/2018    Procedure: Left Heart Cath;  Surgeon: Nadir Tanner MD;  Location:  [facility];  Service: Cardiology   • CORONARY ARTERY BYPASS GRAFT N/A 10/2/2018    Procedure: MEDIAN STERNOTOMY,CORONARY ARTERY BYPASS WITH INTERNAL MAMMARY ARTERY GRAFT  X 5, EVH OF THE RIGHT GREATER SAPHENOUS VEIN;  Surgeon: Shane Bosch MD;  Location:  [facility] OR;  Service: Cardiothoracic   • CORONARY STENT  "PLACEMENT      x 2       FAM HX:  History reviewed. No pertinent family history.    SOC HX:  Social History     Social History   • Marital status:      Spouse name: N/A   • Number of children: N/A   • Years of education: N/A     Occupational History   • Not on file.     Social History Main Topics   • Smoking status: Never Smoker   • Smokeless tobacco: Never Used   • Alcohol use No   • Drug use: No   • Sexual activity: Defer     Other Topics Concern   • Not on file     Social History Narrative   • No narrative on file       PHYSICAL EXAM  /74   Pulse 106   Temp 98.1 °F (36.7 °C) (Oral)   Resp 16   Ht 165.1 cm (65\")   Wt 73.2 kg (161 lb 6.4 oz)   SpO2 96%   BMI 26.86 kg/m²   Wt Readings from Last 3 Encounters:   10/08/18 73.2 kg (161 lb 6.4 oz)   09/24/18 72.6 kg (160 lb)   06/13/18 72.6 kg (160 lb)   ,body mass index is 26.86 kg/m².  Physical Exam   Constitutional: He is oriented to person, place, and time. He appears well-developed and well-nourished.   HENT:   Head: Normocephalic and atraumatic.   Eyes: No scleral icterus.   Neck: Normal range of motion.   Cardiovascular: Normal rate and regular rhythm.    Pulmonary/Chest: Effort normal. No respiratory distress.   Abdominal: Soft. Bowel sounds are normal. He exhibits no distension. There is no tenderness.   Musculoskeletal: Normal range of motion.   Neurological: He is alert and oriented to person, place, and time.   Psychiatric: He has a normal mood and affect. His behavior is normal.   Nursing note and vitals reviewed.      Results Review:   I reviewed the patient's new clinical results.    Lab Results   Component Value Date    WBC 11.95 (H) 10/06/2018    HGB 11.1 (L) 10/07/2018    HGB 10.2 (L) 10/06/2018    HGB 9.1 (L) 10/05/2018    HCT 33.8 (L) 10/07/2018    .0 (H) 10/06/2018     10/06/2018       Lab Results   Component Value Date    INR 1.30 (H) 10/04/2018    INR 1.33 (H) 10/03/2018    INR 1.31 (H) 10/03/2018       Lab Results "   Component Value Date    GLUCOSE 188 (H) 10/07/2018    BUN 11 10/07/2018    CREATININE 0.89 10/07/2018    EGFRIFAFRI 105 10/07/2018    BCR 12.4 10/07/2018    CO2 30.0 10/07/2018    CALCIUM 9.4 10/07/2018    ALBUMIN 4.13 10/06/2018    ALKPHOS 89 10/06/2018    BILITOT 2.7 (H) 10/06/2018     (H) 10/06/2018     (H) 10/06/2018       ASSESSMENTS/PLANS    1. Elevated LFTs postoperatively  2. CAD s/p CABG, POD #6  3. Right upper quadrant pain, mild     Suspect shock liver.       >>> Repeat LFTs today  >>> Obtain hepatitis panel  >>> RUQ ultrasound     I discussed the patients findings and my recommendations with patient    GREGG Crandall  10/08/18  3:33 PM

## 2018-10-09 ENCOUNTER — APPOINTMENT (OUTPATIENT)
Dept: ULTRASOUND IMAGING | Facility: HOSPITAL | Age: 63
End: 2018-10-09

## 2018-10-09 VITALS
WEIGHT: 159.6 LBS | BODY MASS INDEX: 26.59 KG/M2 | HEART RATE: 102 BPM | DIASTOLIC BLOOD PRESSURE: 73 MMHG | HEIGHT: 65 IN | SYSTOLIC BLOOD PRESSURE: 104 MMHG | TEMPERATURE: 98.1 F | OXYGEN SATURATION: 96 % | RESPIRATION RATE: 18 BRPM

## 2018-10-09 LAB
ALBUMIN SERPL-MCNC: 4 G/DL (ref 3.2–4.8)
ALP SERPL-CCNC: 105 U/L (ref 25–100)
ALT SERPL W P-5'-P-CCNC: 211 U/L (ref 7–40)
ANION GAP SERPL CALCULATED.3IONS-SCNC: 10 MMOL/L (ref 3–11)
AST SERPL-CCNC: 41 U/L (ref 0–33)
BILIRUB CONJ SERPL-MCNC: 0.9 MG/DL (ref 0–0.2)
BILIRUB INDIRECT SERPL-MCNC: 1.1 MG/DL (ref 0.1–1.1)
BILIRUB SERPL-MCNC: 2 MG/DL (ref 0.3–1.2)
BUN BLD-MCNC: 10 MG/DL (ref 9–23)
BUN/CREAT SERPL: 12 (ref 7–25)
CALCIUM SPEC-SCNC: 9.5 MG/DL (ref 8.7–10.4)
CHLORIDE SERPL-SCNC: 102 MMOL/L (ref 99–109)
CO2 SERPL-SCNC: 26 MMOL/L (ref 20–31)
CREAT BLD-MCNC: 0.83 MG/DL (ref 0.6–1.3)
GFR SERPL CREATININE-BSD FRML MDRD: 114 ML/MIN/1.73
GLUCOSE BLD-MCNC: 123 MG/DL (ref 70–100)
GLUCOSE BLDC GLUCOMTR-MCNC: 117 MG/DL (ref 70–130)
GLUCOSE BLDC GLUCOMTR-MCNC: 145 MG/DL (ref 70–130)
POTASSIUM BLD-SCNC: 4 MMOL/L (ref 3.5–5.5)
PROT SERPL-MCNC: 6 G/DL (ref 5.7–8.2)
SODIUM BLD-SCNC: 138 MMOL/L (ref 132–146)

## 2018-10-09 PROCEDURE — 99024 POSTOP FOLLOW-UP VISIT: CPT | Performed by: THORACIC SURGERY (CARDIOTHORACIC VASCULAR SURGERY)

## 2018-10-09 PROCEDURE — 82962 GLUCOSE BLOOD TEST: CPT

## 2018-10-09 PROCEDURE — 99232 SBSQ HOSP IP/OBS MODERATE 35: CPT | Performed by: PHYSICIAN ASSISTANT

## 2018-10-09 PROCEDURE — 99024 POSTOP FOLLOW-UP VISIT: CPT | Performed by: PHYSICIAN ASSISTANT

## 2018-10-09 PROCEDURE — 76705 ECHO EXAM OF ABDOMEN: CPT

## 2018-10-09 PROCEDURE — 80076 HEPATIC FUNCTION PANEL: CPT | Performed by: PHYSICIAN ASSISTANT

## 2018-10-09 PROCEDURE — 80048 BASIC METABOLIC PNL TOTAL CA: CPT | Performed by: INTERNAL MEDICINE

## 2018-10-09 RX ADMIN — Medication 5 ML: at 05:08

## 2018-10-09 RX ADMIN — OXYCODONE HYDROCHLORIDE AND ACETAMINOPHEN 2 TABLET: 5; 325 TABLET ORAL at 09:58

## 2018-10-09 RX ADMIN — ASPIRIN 325 MG: 325 TABLET, DELAYED RELEASE ORAL at 09:55

## 2018-10-09 RX ADMIN — METOPROLOL TARTRATE 25 MG: 25 TABLET ORAL at 09:55

## 2018-10-09 RX ADMIN — OXYCODONE HYDROCHLORIDE AND ACETAMINOPHEN 2 TABLET: 5; 325 TABLET ORAL at 05:07

## 2018-10-09 RX ADMIN — OXYCODONE HYDROCHLORIDE AND ACETAMINOPHEN 2 TABLET: 5; 325 TABLET ORAL at 14:00

## 2018-10-09 RX ADMIN — DOCUSATE SODIUM AND SENNOSIDES 2 TABLET: 50; 8.6 TABLET ORAL at 09:54

## 2018-10-09 NOTE — PROGRESS NOTES
Continued Stay Note  [facility]     Patient Name: Judah Cerna  MRN: [id number]  Today's Date: 10/9/2018    Admit Date: 9/28/2018    Consent obtained for the participation in the [facility] Transitions Program. Ofelia Hernandez RN        Discharge Plan    No documentation.             Discharge Codes    No documentation.       Expected Discharge Date and Time     Expected Discharge Date Expected Discharge Time    Oct 5, 2018             Ofelia Hernandez RN

## 2018-10-09 NOTE — PROGRESS NOTES
CTS Progress Note      POD 7 s/p CABG x5     LOS: 8 days     Subjective  On room air.  VSS.  Denies shortness of breath.  Reports RUQ pain has improved. GI evaluated yesterday for elevated LFT's.  RUQ ultrasound performed this morning.    Objective    Vital Signs  Temp:  [98.5 °F (36.9 °C)-98.7 °F (37.1 °C)] 98.7 °F (37.1 °C)  Heart Rate:  [] 87  Resp:  [18] 18  BP: (113-131)/(71-80) 119/80    Physical Exam:   General Appearance: alert, appears stated age and cooperative   Lungs: clear to auscultation     Heart: regular rhythm & normal rate, normal S1, S2 and no murmur, no gallop, no rub   Abdomen:  Soft, mild tenderness to palpation in RUQ   Chest: sternum stable   Skin: Incision c/d/i     Results     Results from last 7 days  Lab Units 10/07/18  0820 10/06/18  0830   WBC 10*3/mm3  --  11.95*   HEMOGLOBIN g/dL 11.1* 10.2*   HEMATOCRIT % 33.8* 31.3*   PLATELETS 10*3/mm3  --  171       Results from last 7 days  Lab Units 10/07/18  1015   SODIUM mmol/L 138   POTASSIUM mmol/L 3.8   CHLORIDE mmol/L 101   CO2 mmol/L 30.0   BUN mg/dL 11   CREATININE mg/dL 0.89   GLUCOSE mg/dL 188*   CALCIUM mg/dL 9.4       Imaging Results (last 24 hours)     ** No results found for the last 24 hours. **          Assessment    * No active hospital problems. *      Plan   RUQ ultrasound per GI.  Appreciate GI.  Lipitor on hold for elevated liver enzymes  Ambulate  Pulm toilet    Possible discharge home today pending GI's recommendations    Milla Wilson PA-C  10/09/18  7:26 AM     AS above.  LFT's improved.  NTA at present time. I have reviewed, verified, and confirmed the above history and current status.  I have examined the patient and confirmed the above physical findings.Above plan and treatment regimen discussed in detail with patient.  Options of treatment, attendant risks vs benefits, and my recommendations were discussed and all questions answered.    Shane Bosch MD  [facility]  10/09/18   10:56 AM

## 2018-10-09 NOTE — PROGRESS NOTES
"GI Daily Progress Note  Subjective:    Chief Complaint: Elevated LFTs    Feeling well.   Eating well and denies abdominal pain.  No nausea nor vomiting.      Objective:    /73 (BP Location: Right arm, Patient Position: Lying)   Pulse 102   Temp 98.1 °F (36.7 °C) (Oral)   Resp 18   Ht 165.1 cm (65\")   Wt 72.4 kg (159 lb 9.6 oz)   SpO2 96%   BMI 26.56 kg/m²     Physical Exam   Constitutional: He is oriented to person, place, and time. He appears well-developed and well-nourished.   Pulmonary/Chest: Effort normal. No respiratory distress.   Abdominal: Soft. Bowel sounds are normal. He exhibits no distension. There is no tenderness.   Neurological: He is alert and oriented to person, place, and time.   Skin: Skin is warm and dry.   Psychiatric: He has a normal mood and affect. His behavior is normal.   Nursing note and vitals reviewed.      Lab  Lab Results   Component Value Date    WBC 11.95 (H) 10/06/2018    HGB 11.1 (L) 10/07/2018    HGB 10.2 (L) 10/06/2018    HGB 9.1 (L) 10/05/2018    .0 (H) 10/06/2018     10/06/2018    INR 1.30 (H) 10/04/2018    INR 1.33 (H) 10/03/2018    INR 1.31 (H) 10/03/2018    INR 1.29 (H) 10/03/2018    INR 1.16 (H) 10/03/2018       Lab Results   Component Value Date    GLUCOSE 123 (H) 10/09/2018    BUN 10 10/09/2018    CREATININE 0.83 10/09/2018    EGFRIFAFRI 114 10/09/2018    BCR 12.0 10/09/2018    CO2 26.0 10/09/2018    CALCIUM 9.5 10/09/2018    ALBUMIN 3.85 10/08/2018    ALKPHOS 99 10/08/2018    BILITOT 1.6 (H) 10/08/2018    BILIDIR 0.8 (H) 10/08/2018     (H) 10/08/2018    AST 50 (H) 10/08/2018     U/S - viscus, echogenic bile in the gallbladder without cholelithiasis, edema nor dilatation of CBD.   Similar findings in 2011    Acute hepatitis panel is pending.    Assessment:    1. Shock liver  2. CAD s/p CABG, POD #6  3. Right upper quadrant pain, mild   4. GB sludge, unchanged from 2011    Plan:    >>> LFTs from yesterday show improvement.  Hepatic panel " today is pending.  Viral serologies negative  >>> U/S consistent with sludge without cholelithiasis, edema or ductal dilatation.  Patient denies post prandial pain.  >>> Shock liver appears to be improving.   Repeat LFTs outpatient in one week to ensure resolution.   [place] [facility] [place] occurs.      Will sign off.   Please call for questions or concerns.     GREGG Crandall  10/09/18  11:52 AM

## 2018-10-09 NOTE — PROGRESS NOTES
"Adult Nutrition  Assessment/PES    Patient Name:  Judah Cerna  YOB: 1955  MRN: [id number]  Admit Date:  9/28/2018    Assessment Date:  10/9/2018    Comments:            Reason for Assessment     Row Name 10/09/18 0837          Reason for Assessment    Reason For Assessment follow-up protocol   5\"               Anthropometrics     Row Name 10/09/18 0500          Anthropometrics    Weight 72.4 kg (159 lb 9.6 oz)                   Nutrition Prescription Ordered     Row Name 10/09/18 0837          Nutrition Prescription PO    Current PO Diet NPO               Problem/Interventions:        Problem 1     Row Name 10/09/18 0837          Nutrition Diagnoses Problem 1    Problem 1 Altered Nutrition Related to Labs     Etiology (related to) MNT for Treatment/Condition     Signs/Symptoms (evidenced by) Biochemical;Other (comment)   diet ordered     Labs Reviewed Done     Specific Labs Noted K+;BUN;Creatinine     Resolved? Yes   renal restriction dc'd; not indicated             Problem 2     Row Name 10/09/18 0837          Nutrition Diagnoses Problem 2    Problem 2 No Nutrition Diagnosis at this Time             Problem 3     Row Name 10/09/18 0837          Nutrition Diagnoses Problem 3    Problem 3 Nutrition Appropriate for Condition at this Time     Signs/Symptoms (evidenced by) PO Intake     Percent (%) intake recorded 100 %     Over number of meals 3   PRIOR TO NPO STATUS WHILE ON HEALTHY HEART, CONSISTENT CHO DIET                       Education/Evaluation     Row Name 10/09/18 0838          Monitor/Evaluation    Monitor Per protocol         Electronically signed by:  Tamara Mensah RD  10/09/18 8:38 AM  "

## 2018-10-09 NOTE — PLAN OF CARE
Problem: Patient Care Overview  Goal: Plan of Care Review  Outcome: Outcome(s) achieved Date Met: 10/09/18   10/09/18 1523   Coping/Psychosocial   Plan of Care Reviewed With patient   Plan of Care Review   Progress improving   OTHER   Outcome Summary GI signed off, f/u as outpt if pain persisrtsa, has ambulated numerous times today.   Coping/Psychosocial   Patient Agreement with Plan of Care agrees       Problem: Cardiac Surgery (Adult)  Goal: Signs and Symptoms of Listed Potential Problems Will be Absent, Minimized or Managed (Cardiac Surgery)  Outcome: Outcome(s) achieved Date Met: 10/09/18      Problem: Fall Risk (Adult)  Goal: Identify Related Risk Factors and Signs and Symptoms  Outcome: Outcome(s) achieved Date Met: 10/09/18      Problem: Skin Injury Risk (Adult)  Goal: Identify Related Risk Factors and Signs and Symptoms  Outcome: Outcome(s) achieved Date Met: 10/09/18

## 2018-10-09 NOTE — PAYOR COMM NOTE
"Judah Cerna (62 y.o. 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, [phone]          Date of Birth Social Security Number Address Home Phone MRN    1955  [address] [id number] [phone] [id number]    Jewish Marital Status          Unknown        Admission Date Admission Type Admitting Provider Attending Provider Department, Room/Bed    9/28/18 Emergency Nadir Tanner MD Jones, Michael R, MD [facility] 4G, S446/1    Discharge Date Discharge Disposition Discharge Destination                       Attending Provider:  Nadir Tanner MD    Allergies:  No Known Allergies    Isolation:  None   Infection:  None   Code Status:  CPR    Ht:  165.1 cm (65\")   Wt:  72.4 kg (159 lb 9.6 oz)    Admission Cmt:  None   Principal Problem:  None                Active Insurance as of 9/28/2018     Primary Coverage     Payor Plan Insurance Group Employer/Plan Group    [facility] [place] [id number]     Payor Plan Address Payor Plan Phone Number Effective From Effective To    [address]  1/13/2002     [facility] [address]       Subscriber Name Subscriber Birth Date Member ID       JUDAH CERNA 1955 [id number]                 Emergency Contacts      (Rel.) Home Phone Work Phone Mobile Phone    Fe Cerna (Spouse) [phone] -- --            Lab Results (most recent)     Procedure Component Value Units Date/Time    POC Glucose Once [[id number]]  (Normal) Collected:  10/09/18 0855    Specimen:  Blood Updated:  10/09/18 0857     Glucose 117 mg/dL     Narrative:       Meter: [id number] : [id number] Juan Antonio Miracle    POC Glucose Once [[id number]]  (Normal) Collected:  10/08/18 2029    Specimen:  Blood Updated:  10/08/18 2030     Glucose 117 mg/dL     Narrative:       Meter: [id number] : [id number] Tavares Hayes    Hepatitis Panel, Acute [[id number]]  (Normal) Collected:  10/08/18 1612    Specimen:  Blood Updated:  10/08/18 1822     Hepatitis B " Surface Ag Non-Reactive     Hep A IgM Non-Reactive     Comment: Results may be falsely decreased if patient taking Biotin.        Hep B C IgM Non-Reactive     Comment: Results may be falsely decreased if patient taking Biotin.        Hepatitis C Ab Non-Reactive    Hepatic Function Panel [[id number]]  (Abnormal) Collected:  10/08/18 1612    Specimen:  Blood Updated:  10/08/18 1733     Total Protein 6.1 g/dL      Albumin 3.85 g/dL      ALT (SGPT) 266 (H) U/L      AST (SGOT) 50 (H) U/L      Alkaline Phosphatase 99 U/L      Total Bilirubin 1.6 (H) mg/dL      Bilirubin, Direct 0.8 (H) mg/dL      Bilirubin, Indirect 0.8 mg/dL     POC Glucose Once [[id number]]  (Abnormal) Collected:  10/08/18 1644    Specimen:  Blood Updated:  10/08/18 1647     Glucose 149 (H) mg/dL     Narrative:       Meter: [id number] : [id number] [place] Miracle    POC Glucose Once [[id number]]  (Abnormal) Collected:  10/08/18 1119    Specimen:  Blood Updated:  10/08/18 1120     Glucose 153 (H) mg/dL     Narrative:       Meter: [id number] : [id number] [place]kton Miracle    POC Glucose Once [[id number]]  (Normal) Collected:  10/08/18 0724    Specimen:  Blood Updated:  10/08/18 0726     Glucose 116 mg/dL     Narrative:       Meter: [id number] : [id number] Elkton Miracle    POC Glucose Once [[id number]]  (Abnormal) Collected:  10/07/18 1959    Specimen:  Blood Updated:  10/07/18 2002     Glucose 179 (H) mg/dL     Narrative:       Meter: [id number] : [id number] Sald[place]    POC Glucose Once [[id number]]  (Abnormal) Collected:  10/07/18 1658    Specimen:  Blood Updated:  10/07/18 1659     Glucose 148 (H) mg/dL     Narrative:       Meter: [id number] : [id number] Elkton Miracle    POC Glucose Once [[id number]]  (Abnormal) Collected:  10/07/18 1241    Specimen:  Blood Updated:  10/07/18 1246     Glucose 157 (H) mg/dL     Narrative:       Meter: [id number] : [id number] Elkton Miracle    Basic Metabolic Panel [[id number]]  (Abnormal) Collected:   10/07/18 1015    Specimen:  Blood Updated:  10/07/18 1048     Glucose 188 (H) mg/dL      BUN 11 mg/dL      Creatinine 0.89 mg/dL      Sodium 138 mmol/L      Potassium 3.8 mmol/L      Chloride 101 mmol/L      CO2 30.0 mmol/L      Calcium 9.4 mg/dL      eGFR  African Amer 105 mL/min/1.73      BUN/Creatinine Ratio 12.4     Anion Gap 7.0 mmol/L     Narrative:       National Kidney Foundation Guidelines    Stage     Description        GFR  1         Normal or High     90+  2         Mild decrease      60-89  3         Moderate decrease  30-59  4         Severe decrease    15-29  5         Kidney failure     <15    The MDRD GFR formula is only valid for adults with stable renal function between ages 18 and 70.    POC Glucose Once [[id number]]  (Abnormal) Collected:  10/07/18 0854    Specimen:  Blood Updated:  10/07/18 0856     Glucose 140 (H) mg/dL     Narrative:       Meter: [id number] : [id number] Deven CARMONA    Hemoglobin & Hematocrit, Blood [[id number]]  (Abnormal) Collected:  10/07/18 0820    Specimen:  Blood Updated:  10/07/18 0841     Hemoglobin 11.1 (L) g/dL      Hematocrit 33.8 (L) %     POC Glucose Once [[id number]]  (Abnormal) Collected:  10/06/18 2044    Specimen:  Blood Updated:  10/06/18 2046     Glucose 161 (H) mg/dL     Narrative:       Meter: [id number] : [id number] Tavares Hayes    POC Glucose Once [[id number]]  (Abnormal) Collected:  10/06/18 1625    Specimen:  Blood Updated:  10/06/18 1626     Glucose 151 (H) mg/dL     Narrative:       Meter: [id number] : [id number] [place]    POC Glucose Once [[id number]]  (Abnormal) Collected:  10/06/18 1127    Specimen:  Blood Updated:  10/06/18 1129     Glucose 176 (H) mg/dL     Narrative:       Meter: [id number] : [id number] [place]    Comprehensive Metabolic Panel [[id number]]  (Abnormal) Collected:  10/06/18 0830    Specimen:  Blood Updated:  10/06/18 0954     Glucose 114 (H) mg/dL      BUN 17 mg/dL      Creatinine 0.85 mg/dL       Sodium 139 mmol/L      Potassium 3.6 mmol/L      Chloride 101 mmol/L      CO2 27.0 mmol/L      Calcium 9.2 mg/dL      Total Protein 6.4 g/dL      Albumin 4.13 g/dL      ALT (SGPT) 704 (H) U/L      AST (SGOT) 204 (H) U/L      Alkaline Phosphatase 89 U/L      Total Bilirubin 2.7 (H) mg/dL      eGFR  African Amer 111 mL/min/1.73      Globulin 2.3 gm/dL      A/G Ratio 1.8 g/dL      BUN/Creatinine Ratio 20.0     Anion Gap 11.0 mmol/L     Narrative:       National Kidney Foundation Guidelines    Stage     Description        GFR  1         Normal or High     90+  2         Mild decrease      60-89  3         Moderate decrease  30-59  4         Severe decrease    15-29  5         Kidney failure     <15    The MDRD GFR formula is only valid for adults with stable renal function between ages 18 and 70.    Magnesium [[id number]]  (Normal) Collected:  10/06/18 0830    Specimen:  Blood Updated:  10/06/18 0954     Magnesium 1.9 mg/dL     Phosphorus [[id number]]  (Abnormal) Collected:  10/06/18 0830    Specimen:  Blood Updated:  10/06/18 0954     Phosphorus 2.2 (L) mg/dL     CBC & Differential [[id number]] Collected:  10/06/18 0830    Specimen:  Blood Updated:  10/06/18 0908    Narrative:       The following orders were created for panel order CBC & Differential.  Procedure                               Abnormality         Status                     ---------                               -----------         ------                     CBC Auto Differential[[id number]]        Abnormal            Final result                 Please view results for these tests on the individual orders.    CBC Auto Differential [[id number]]  (Abnormal) Collected:  10/06/18 0830    Specimen:  Blood Updated:  10/06/18 0908     WBC 11.95 (H) 10*3/mm3      RBC 3.10 (L) 10*6/mm3      Hemoglobin 10.2 (L) g/dL      Hematocrit 31.3 (L) %      .0 (H) fL      MCH 32.9 (H) pg      MCHC 32.6 g/dL      RDW 20.8 (H) %      RDW-SD 74.2 (H) fl      MPV 10.5 fL       Platelets 171 10*3/mm3      Neutrophil % 74.9 (H) %      Lymphocyte % 10.5 (L) %      Monocyte % 10.7 %      Eosinophil % 3.2 (H) %      Basophil % 0.3 %      Immature Grans % 0.4 %      Neutrophils, Absolute 8.95 (H) 10*3/mm3      Lymphocytes, Absolute 1.26 10*3/mm3      Monocytes, Absolute 1.28 (H) 10*3/mm3      Eosinophils, Absolute 0.38 (H) 10*3/mm3      Basophils, Absolute 0.03 10*3/mm3      Immature Grans, Absolute 0.05 (H) 10*3/mm3     POC Glucose Once [[id number]]  (Normal) Collected:  10/06/18 0738    Specimen:  Blood Updated:  10/06/18 0739     Glucose 124 mg/dL     Narrative:       Meter: [id number] : [id number] Randall Davenport    POC Glucose Once [[id number]]  (Normal) Collected:  10/05/18 2038    Specimen:  Blood Updated:  10/05/18 2050     Glucose 108 mg/dL     Narrative:       Meter: [id number] : [id number] Jared Tompkins    POC Glucose Once [[id number]]  (Abnormal) Collected:  10/05/18 1630    Specimen:  Blood Updated:  10/05/18 1637     Glucose 157 (H) mg/dL     Narrative:       Meter: [id number] : [id number] Andrea Aguayo    POC Glucose Once [[id number]]  (Abnormal) Collected:  10/05/18 1108    Specimen:  Blood Updated:  10/05/18 1123     Glucose 155 (H) mg/dL     Narrative:       Meter: [id number] : [id number] [place]    POC Glucose Once [[id number]]  (Normal) Collected:  10/05/18 0714    Specimen:  Blood Updated:  10/05/18 0720     Glucose 110 mg/dL     Narrative:       Meter: [id number] : [id number] [place]    CBC & Differential [[id number]] Collected:  10/05/18 0357    Specimen:  Blood Updated:  10/05/18 0528    Narrative:       The following orders were created for panel order CBC & Differential.  Procedure                               Abnormality         Status                     ---------                               -----------         ------                     CBC Auto Differential[[id number]]        Abnormal            Final result                 Please  view results for these tests on the individual orders.    CBC Auto Differential [[id number]]  (Abnormal) Collected:  10/05/18 0357    Specimen:  Blood Updated:  10/05/18 0528     WBC 12.11 (H) 10*3/mm3      RBC 2.80 (L) 10*6/mm3      Hemoglobin 9.1 (L) g/dL      Hematocrit 27.0 (L) %      MCV 96.4 fL      MCH 32.5 (H) pg      MCHC 33.7 g/dL      RDW 21.1 (H) %      RDW-SD 74.4 (H) fl      MPV 11.2 fL      Platelets 152 10*3/mm3      Neutrophil % 83.6 (H) %      Lymphocyte % 6.9 (L) %      Monocyte % 6.7 %      Eosinophil % 2.6 %      Basophil % 0.2 %      Immature Grans % 0.3 %      Neutrophils, Absolute 10.13 (H) 10*3/mm3      Lymphocytes, Absolute 0.83 10*3/mm3      Monocytes, Absolute 0.81 10*3/mm3      Eosinophils, Absolute 0.32 (H) 10*3/mm3      Basophils, Absolute 0.02 10*3/mm3      Immature Grans, Absolute 0.04 (H) 10*3/mm3      nRBC 0.0 /100 WBC     Comprehensive Metabolic Panel [[id number]]  (Abnormal) Collected:  10/05/18 0357    Specimen:  Blood Updated:  10/05/18 0528     Glucose 113 (H) mg/dL      BUN 16 mg/dL      Creatinine 0.89 mg/dL      Sodium 136 mmol/L      Potassium 4.3 mmol/L      Chloride 101 mmol/L      CO2 33.0 (H) mmol/L      Calcium 9.0 mg/dL      Total Protein 5.8 g/dL      Albumin 3.84 g/dL      ALT (SGPT) 928 (H) U/L      AST (SGOT) 436 (H) U/L      Alkaline Phosphatase 72 U/L      Total Bilirubin 3.5 (H) mg/dL      eGFR  African Amer 105 mL/min/1.73      Globulin 2.0 gm/dL      A/G Ratio 2.0 g/dL      BUN/Creatinine Ratio 18.0     Anion Gap 2.0 (L) mmol/L     Narrative:       National Kidney Foundation Guidelines    Stage     Description        GFR  1         Normal or High     90+  2         Mild decrease      60-89  3         Moderate decrease  30-59  4         Severe decrease    15-29  5         Kidney failure     <15    The MDRD GFR formula is only valid for adults with stable renal function between ages 18 and 70.    Magnesium [[id number]]  (Normal) Collected:  10/05/18 0352     Specimen:  Blood Updated:  10/05/18 0528     Magnesium 2.0 mg/dL     Phosphorus [[id number]]  (Abnormal) Collected:  10/05/18 0357    Specimen:  Blood Updated:  10/05/18 0528     Phosphorus 1.8 (L) mg/dL     POC Glucose Once [[id number]]  (Abnormal) Collected:  10/04/18 2041    Specimen:  Blood Updated:  10/04/18 2056     Glucose 165 (H) mg/dL     Narrative:       Meter: [id number] : [id number] Sher Lucie Ha    POC Glucose Once [[id number]]  (Abnormal) Collected:  10/04/18 1617    Specimen:  Blood Updated:  10/04/18 1619     Glucose 153 (H) mg/dL     Narrative:       Meter: [id number] : [id number] Parker Knapp    Vitamin D 25 Hydroxy [[id number]] Collected:  10/04/18 1132    Specimen:  Blood Updated:  10/04/18 1206     25 Hydroxy, Vitamin D 17.3 ng/ml     Narrative:       Reference Ranges for Total Vitamin D 25(OH)    Deficiency      <20.0 ng/ml  Insufficiency   20-30 ng/ml  Sufficiency     ng/ml  Toxicity         >100 ng/ml    POC Glucose Once [[id number]]  (Abnormal) Collected:  10/04/18 1101    Specimen:  Blood Updated:  10/04/18 1104     Glucose 174 (H) mg/dL     Narrative:       Meter: [id number] : [id number] Parker Knapp    POC Glucose Once [[id number]]  (Abnormal) Collected:  10/04/18 0716    Specimen:  Blood Updated:  10/04/18 0717     Glucose 133 (H) mg/dL     Narrative:       Meter: [id number] : [id number] Knapp    Magnesium [[id number]]  (Normal) Collected:  10/04/18 0408    Specimen:  Blood Updated:  10/04/18 0519     Magnesium 2.2 mg/dL     Comprehensive Metabolic Panel [[id number]]  (Abnormal) Collected:  10/04/18 0408    Specimen:  Blood Updated:  10/04/18 0519     Glucose 138 (H) mg/dL      BUN 18 mg/dL      Creatinine 1.04 mg/dL      Sodium 139 mmol/L      Potassium 5.3 mmol/L      Chloride 104 mmol/L      CO2 27.0 mmol/L      Calcium 9.0 mg/dL      Total Protein 6.0 g/dL      Albumin 4.04 g/dL      ALT (SGPT) 723 (H) U/L      AST (SGOT) 388 (H) U/L      Alkaline Phosphatase 59 U/L       Total Bilirubin 5.2 (H) mg/dL      eGFR  African Amer 88 mL/min/1.73      Globulin 2.0 gm/dL      A/G Ratio 2.1 g/dL      BUN/Creatinine Ratio 17.3     Anion Gap 8.0 mmol/L     Narrative:       National Kidney Foundation Guidelines    Stage     Description        GFR  1         Normal or High     90+  2         Mild decrease      60-89  3         Moderate decrease  30-59  4         Severe decrease    15-29  5         Kidney failure     <15    The MDRD GFR formula is only valid for adults with stable renal function between ages 18 and 70.    Phosphorus [[id number]]  (Normal) Collected:  10/04/18 0408    Specimen:  Blood Updated:  10/04/18 0519     Phosphorus 3.1 mg/dL     Protime-INR [[id number]]  (Abnormal) Collected:  10/04/18 0408    Specimen:  Blood Updated:  10/04/18 0442     Protime 13.6 (H) Seconds      INR 1.30 (H)    CBC & Differential [[id number]] Collected:  10/04/18 0408    Specimen:  Blood Updated:  10/04/18 0427    Narrative:       The following orders were created for panel order CBC & Differential.  Procedure                               Abnormality         Status                     ---------                               -----------         ------                     CBC Auto Differential[[id number]]        Abnormal            Final result                 Please view results for these tests on the individual orders.    CBC Auto Differential [[id number]]  (Abnormal) Collected:  10/04/18 0408    Specimen:  Blood Updated:  10/04/18 0427     WBC 10.43 10*3/mm3      RBC 2.93 (L) 10*6/mm3      Hemoglobin 9.4 (L) g/dL      Hematocrit 28.3 (L) %      MCV 96.6 fL      MCH 32.1 (H) pg      MCHC 33.2 g/dL      RDW 22.0 (H) %      RDW-SD 78.0 (H) fl      MPV 10.5 fL      Platelets 145 (L) 10*3/mm3      Neutrophil % 85.9 (H) %      Lymphocyte % 7.5 (L) %      Monocyte % 5.8 %      Eosinophil % 0.6 %      Basophil % 0.2 %      Immature Grans % 0.2 %      Neutrophils, Absolute 8.97 (H) 10*3/mm3      Lymphocytes,  Absolute 0.78 10*3/mm3      Monocytes, Absolute 0.60 10*3/mm3      Eosinophils, Absolute 0.06 10*3/mm3      Basophils, Absolute 0.02 10*3/mm3      Immature Grans, Absolute 0.02 10*3/mm3     Protime-INR [[id number]]  (Abnormal) Collected:  10/03/18 2343    Specimen:  Blood Updated:  10/04/18 0025     Protime 14.0 (H) Seconds      INR 1.33 (H)    CBC (No Diff) [[id number]]  (Abnormal) Collected:  10/03/18 2343    Specimen:  Blood Updated:  10/04/18 0007     WBC 9.70 10*3/mm3      RBC 2.95 (L) 10*6/mm3      Hemoglobin 9.4 (L) g/dL      Hematocrit 28.5 (L) %      MCV 96.6 fL      MCH 31.9 (H) pg      MCHC 33.0 g/dL      RDW 22.3 (H) %      RDW-SD 78.2 (H) fl      MPV 11.1 fL      Platelets 143 (L) 10*3/mm3     POC Glucose Once [[id number]]  (Abnormal) Collected:  10/03/18 2019    Specimen:  Blood Updated:  10/03/18 2021     Glucose 166 (H) mg/dL     Narrative:       Meter: [id number] : [id number] Sher Lucie Ha    Protime-INR [[id number]]  (Abnormal) Collected:  10/03/18 1814    Specimen:  Blood Updated:  10/03/18 1842     Protime 13.8 (H) Seconds      INR 1.31 (H)    CBC (No Diff) [[id number]]  (Abnormal) Collected:  10/03/18 1814    Specimen:  Blood Updated:  10/03/18 1828     WBC 9.55 10*3/mm3      RBC 2.77 (L) 10*6/mm3      Hemoglobin 8.8 (L) g/dL      Hematocrit 26.5 (L) %      MCV 95.7 fL      MCH 31.8 (H) pg      MCHC 33.2 g/dL      RDW 22.1 (H) %      RDW-SD 77.7 (H) fl      MPV 10.4 fL      Platelets 148 (L) 10*3/mm3     Folate RBC [[id number]]  (Abnormal) Collected:  10/02/18 1536    Specimen:  Blood Updated:  10/03/18 1713     Folate, Hemolysate 241.3 ng/mL      Hematocrit 19.1 (L) %      RBC Folate 1263 ng/mL     Narrative:       Performed at:  [address] [place] [address]  [id number]  : Mauriico Napier PhD, Phone:  [phone]    POC Glucose Once [[id number]]  (Normal) Collected:  10/03/18 1617    Specimen:  Blood Updated:  10/03/18 1631     Glucose 112 mg/dL     Narrative:        Meter: [id number] : [id number] Parker Knapp    Protime-INR [[id number]]  (Abnormal) Collected:  10/03/18 1157    Specimen:  Blood Updated:  10/03/18 1259     Protime 13.5 (H) Seconds      INR 1.29 (H)    CBC (No Diff) [[id number]]  (Abnormal) Collected:  10/03/18 1157    Specimen:  Blood Updated:  10/03/18 1243     WBC 9.23 10*3/mm3      RBC 2.82 (L) 10*6/mm3      Hemoglobin 9.1 (L) g/dL      Hematocrit 27.5 (L) %      MCV 97.5 fL      MCH 32.3 (H) pg      MCHC 33.1 g/dL      RDW 22.1 (H) %      RDW-SD 78.6 (H) fl      MPV 10.6 fL      Platelets 164 10*3/mm3     POC Glucose Once [[id number]]  (Normal) Collected:  10/03/18 1108    Specimen:  Blood Updated:  10/03/18 1110     Glucose 126 mg/dL     Narrative:       Meter: [id number] : [id number] Parker Knapp    POC Glucose Once [[id number]]  (Normal) Collected:  10/03/18 1000    Specimen:  Blood Updated:  10/03/18 1002     Glucose 119 mg/dL     Narrative:       Meter: [id number] : [id number] Parker Aria    POC Glucose Once [[id number]]  (Normal) Collected:  10/03/18 0911    Specimen:  Blood Updated:  10/03/18 0931     Glucose 117 mg/dL     Narrative:       Meter: [id number] : [id number] Parker Aria    POC Glucose Once [[id number]]  (Normal) Collected:  10/03/18 0801    Specimen:  Blood Updated:  10/03/18 0804     Glucose 109 mg/dL     Narrative:       Meter: [id number] : [id number] Parker Aria    POC Glucose Once [[id number]]  (Normal) Collected:  10/03/18 0651    Specimen:  Blood Updated:  10/03/18 0702     Glucose 121 mg/dL     Narrative:       Meter: [id number] : [id number] [facility]    Blood Gas, Arterial [[id number]]  (Abnormal) Collected:  10/03/18 0700    Specimen:  Arterial Blood Updated:  10/03/18 0702     Site Arterial Line     Ulises's Test N/A     pH, Arterial 7.409 pH units      pCO2, Arterial 37.6 mm Hg      pO2, Arterial 94.8 mm Hg      HCO3, Arterial 23.7 mmol/L      Base Excess, Arterial -0.8 (L) mmol/L      Hemoglobin, Blood Gas 9.2 (L) g/dL       Hematocrit, Blood Gas 28.2 %      Oxyhemoglobin 95.5 %      Methemoglobin 1.00 %      Carboxyhemoglobin 1.6 %      CO2 Content 24.9 mmol/L      Barometric Pressure for Blood Gas -- mmHg      Comment: N/A        Modality Adult Vent     FIO2 30 %     Blood Gas, Arterial [[id number]]  (Abnormal) Collected:  10/03/18 0631    Specimen:  Arterial Blood Updated:  10/03/18 0634     Site Arterial Line     Ulises's Test N/A     pH, Arterial 7.411 pH units      pCO2, Arterial 37.2 mm Hg      pO2, Arterial 97.0 mm Hg      HCO3, Arterial 23.7 mmol/L      Base Excess, Arterial -0.8 (L) mmol/L      Hemoglobin, Blood Gas 9.2 (L) g/dL      Hematocrit, Blood Gas 28.3 %      Oxyhemoglobin 95.6 %      Methemoglobin 1.00 %      Carboxyhemoglobin 1.6 %      CO2 Content 24.8 mmol/L      Barometric Pressure for Blood Gas -- mmHg      Comment: N/A        Modality Adult Vent     FIO2 30 %     POC Glucose Once [[id number]]  (Normal) Collected:  10/03/18 0559    Specimen:  Blood Updated:  10/03/18 0602     Glucose 122 mg/dL     Narrative:       Meter: [id number] : [id number] Chicho Hdz    POC Glucose Once [[id number]]  (Normal) Collected:  10/03/18 0522    Specimen:  Blood Updated:  10/03/18 0542     Glucose 127 mg/dL     Narrative:       Meter: [id number] : [id number] Brittnee Michelle    Protime-INR [[id number]]  (Abnormal) Collected:  10/03/18 0345    Specimen:  Blood Updated:  10/03/18 0434     Protime 12.2 (H) Seconds      INR 1.16 (H)    Renal Function Panel [[id number]]  (Abnormal) Collected:  10/03/18 0345    Specimen:  Blood Updated:  10/03/18 0434     Glucose 118 (H) mg/dL      BUN 17 mg/dL      Creatinine 1.07 mg/dL      Sodium 145 mmol/L      Potassium 4.7 mmol/L      Chloride 115 (H) mmol/L      CO2 25.0 mmol/L      Calcium 8.1 (L) mg/dL      Albumin 4.00 g/dL      Phosphorus 3.7 mg/dL      Anion Gap 5.0 mmol/L      BUN/Creatinine Ratio 15.9     eGFR  African Amer 85 mL/min/1.73     Narrative:       National Kidney  Foundation Guidelines    Stage     Description        GFR  1         Normal or High     90+  2         Mild decrease      60-89  3         Moderate decrease  30-59  4         Severe decrease    15-29  5         Kidney failure     <15    The MDRD GFR formula is only valid for adults with stable renal function between ages 18 and 70.    Magnesium [[id number]]  (Normal) Collected:  10/03/18 0345    Specimen:  Blood Updated:  10/03/18 0433     Magnesium 2.3 mg/dL     CBC & Differential [[id number]] Collected:  10/03/18 0345    Specimen:  Blood Updated:  10/03/18 0419    Narrative:       The following orders were created for panel order CBC & Differential.  Procedure                               Abnormality         Status                     ---------                               -----------         ------                     CBC Auto Differential[[id number]]        Abnormal            Final result                 Please view results for these tests on the individual orders.    CBC Auto Differential [[id number]]  (Abnormal) Collected:  10/03/18 0345    Specimen:  Blood Updated:  10/03/18 0419     WBC 7.43 10*3/mm3      RBC 2.69 (L) 10*6/mm3      Hemoglobin 8.7 (L) g/dL      Hematocrit 25.8 (L) %      MCV 95.9 fL      MCH 32.3 (H) pg      MCHC 33.7 g/dL      RDW 20.5 (H) %      RDW-SD 70.6 (H) fl      MPV 11.2 fL      Platelets 185 10*3/mm3      Neutrophil % 89.0 (H) %      Lymphocyte % 5.5 (L) %      Monocyte % 5.4 %      Eosinophil % 0.0 %      Basophil % 0.1 %      Immature Grans % 0.1 %      Neutrophils, Absolute 6.61 10*3/mm3      Lymphocytes, Absolute 0.41 (L) 10*3/mm3      Monocytes, Absolute 0.40 10*3/mm3      Eosinophils, Absolute 0.00 10*3/mm3      Basophils, Absolute 0.01 10*3/mm3      Immature Grans, Absolute 0.01 10*3/mm3     POC Glucose Once [[id number]]  (Normal) Collected:  10/03/18 0404    Specimen:  Blood Updated:  10/03/18 0406     Glucose 127 mg/dL     Narrative:       Meter: [id number] : [id number]  Valentino Andreina    POC Glucose Once [[id number]]  (Normal) Collected:  10/03/18 0308    Specimen:  Blood Updated:  10/03/18 0309     Glucose 125 mg/dL     Narrative:       Meter: [id number] : [id number] Valentino Andreina    POC Glucose Once [[id number]]  (Normal) Collected:  10/03/18 0207    Specimen:  Blood Updated:  10/03/18 0208     Glucose 128 mg/dL     Narrative:       Meter: [id number] : [id number] Valentino Andreina    POC Glucose Once [[id number]]  (Normal) Collected:  10/03/18 0107    Specimen:  Blood Updated:  10/03/18 0108     Glucose 119 mg/dL     Narrative:       Meter: [id number] : [id number] Valentino Andreina    POC Glucose Once [[id number]]  (Normal) Collected:  10/02/18 2359    Specimen:  Blood Updated:  10/03/18 0003     Glucose 111 mg/dL     Narrative:       Meter: [id number] : [id number] Valentino Andreina    POC Glucose Once [[id number]]  (Normal) Collected:  10/02/18 2301    Specimen:  Blood Updated:  10/02/18 2321     Glucose 103 mg/dL     Narrative:       Meter: [id number] : [id number] Valentino Andreina    Calcium, Ionized [[id number]]  (Normal) Collected:  10/02/18 2257    Specimen:  Blood Updated:  10/02/18 2313     Ionized Calcium 1.17 mmol/L     Renal Function Panel [[id number]]  (Abnormal) Collected:  10/02/18 2157    Specimen:  Blood Updated:  10/02/18 2230     Glucose 108 (H) mg/dL      BUN 16 mg/dL      Creatinine 1.10 mg/dL      Sodium 144 mmol/L      Potassium 3.9 mmol/L      Chloride 113 (H) mmol/L      CO2 24.0 mmol/L      Calcium 7.8 (L) mg/dL      Albumin 4.29 g/dL      Phosphorus 1.1 (L) mg/dL      Anion Gap 7.0 mmol/L      BUN/Creatinine Ratio 14.5     eGFR  African Amer 82 mL/min/1.73     Narrative:       National Kid[employer] Guidelines    Stage     Description        GFR  1         Normal or High     90+  2         Mild decrease      60-89  3         Moderate decrease  30-59  4         Severe decrease    15-29  5         Kidney failure     <15    The MDRD GFR formula is only  valid for adults with stable renal function between ages 18 and 70.    Magnesium [[id number]]  (Normal) Collected:  10/02/18 2157    Specimen:  Blood Updated:  10/02/18 2230     Magnesium 2.4 mg/dL     CBC (No Diff) [[id number]]  (Abnormal) Collected:  10/02/18 2157    Specimen:  Blood Updated:  10/02/18 2220     WBC 7.14 10*3/mm3      RBC 2.15 (L) 10*6/mm3      Hemoglobin 7.2 (L) g/dL      Hematocrit 21.2 (L) %      MCV 98.6 fL      MCH 33.5 (H) pg      MCHC 34.0 g/dL      RDW 19.5 (H) %      RDW-SD 68.3 (H) fl      MPV 10.7 fL      Platelets 182 10*3/mm3     POC Glucose Once [[id number]]  (Normal) Collected:  10/02/18 2213    Specimen:  Blood Updated:  10/02/18 2216     Glucose 111 mg/dL     Narrative:       Meter: [id number] : [id number] Chicho Hdz    Protime-INR [[id number]]  (Abnormal) Collected:  10/02/18 2046    Specimen:  Blood Updated:  10/02/18 2125     Protime 11.7 (H) Seconds      INR 1.11 (H)    Fibrinogen [[id number]]  (Abnormal) Collected:  10/02/18 2046    Specimen:  Blood Updated:  10/02/18 2125     Fibrinogen 174 (L) mg/dL     aPTT [[id number]]  (Normal) Collected:  10/02/18 2046    Specimen:  Blood Updated:  10/02/18 2125     PTT 25.9 seconds     Narrative:       PTT = The equivalent PTT values for the therapeutic range of heparin levels at 0.3 to 0.5 U/ml are 55 to 70 seconds.    Fibrin Split Products [[id number]]  (Normal) Collected:  10/02/18 2046    Specimen:  Blood Updated:  10/02/18 2115     Fibrin Split Products Less than 10 mcg/mL    POC Glucose Once [[id number]]  (Normal) Collected:  10/02/18 2056    Specimen:  Blood Updated:  10/02/18 2057     Glucose 119 mg/dL     Narrative:       Meter: [id number] : [id number] Chicho Hdz    CBC (No Diff) [[id number]]  (Abnormal) Collected:  10/02/18 2046    Specimen:  Blood Updated:  10/02/18 2054     WBC 6.97 10*3/mm3      RBC 2.22 (L) 10*6/mm3      Hemoglobin 7.5 (L) g/dL      Hematocrit 22.2 (L) %      .0 (H) fL      MCH 33.8 (H) pg       MCHC 33.8 g/dL      RDW 18.8 (H) %      RDW-SD 67.8 (H) fl      MPV 10.3 fL      Platelets 174 10*3/mm3     Blood Gas, Arterial [[id number]]  (Abnormal) Collected:  10/02/18 2044    Specimen:  Arterial Blood Updated:  10/02/18 2046     Site Arterial Line     Ulises's Test N/A     pH, Arterial 7.343 (L) pH units      pCO2, Arterial 44.5 mm Hg      pO2, Arterial 115.0 (H) mm Hg      HCO3, Arterial 24.2 mmol/L      Base Excess, Arterial -1.5 (L) mmol/L      Hemoglobin, Blood Gas 7.6 (L) g/dL      Hematocrit, Blood Gas 23.3 %      Oxyhemoglobin 95.8 %      Methemoglobin 1.40 %      Carboxyhemoglobin 1.7 %      CO2 Content 25.5 mmol/L      Barometric Pressure for Blood Gas -- mmHg      Comment: N/A        Modality Adult Vent     FIO2 35 %     POC Glucose Once [[id number]]  (Normal) Collected:  10/02/18 2002    Specimen:  Blood Updated:  10/02/18 2005     Glucose 126 mg/dL     Narrative:       Meter: [id number] : [id number] Chicho Hdz    POC Glucose Once [[id number]]  (Abnormal) Collected:  10/02/18 1859    Specimen:  Blood Updated:  10/02/18 1914     Glucose 160 (H) mg/dL     Narrative:       Meter: [id number] : [id number] Parker Knapp    POC Glucose Once [[id number]]  (Abnormal) Collected:  10/02/18 1820    Specimen:  Blood Updated:  10/02/18 1822     Glucose 192 (H) mg/dL     Narrative:       Meter: [id number] : [id number] Parker Knapp    CBC (No Diff) [[id number]]  (Abnormal) Collected:  10/02/18 1725    Specimen:  Blood Updated:  10/02/18 1803     WBC 8.10 10*3/mm3      RBC 1.45 (L) 10*6/mm3      Hemoglobin 5.3 (C) g/dL      Hematocrit 15.3 (C) %      .5 (H) fL      MCH 36.6 (H) pg      MCHC 34.6 g/dL      RDW 17.3 (H) %      RDW-SD 64.9 (H) fl      MPV 10.2 fL      Platelets 210 10*3/mm3     Renal Function Panel [[id number]]  (Abnormal) Collected:  10/02/18 1725    Specimen:  Blood Updated:  10/02/18 1754     Glucose 208 (H) mg/dL      BUN 14 mg/dL      Creatinine 1.11 mg/dL      Sodium 143 mmol/L       Potassium 3.9 mmol/L      Chloride 107 mmol/L      CO2 25.0 mmol/L      Calcium 8.5 (L) mg/dL      Albumin 4.08 g/dL      Phosphorus 1.2 (L) mg/dL      Anion Gap 11.0 mmol/L      BUN/Creatinine Ratio 12.6     eGFR   Amer 82 mL/min/1.73     Narrative:       National Kidney Foundation Guidelines    Stage     Description        GFR  1         Normal or High     90+  2         Mild decrease      60-89  3         Moderate decrease  30-59  4         Severe decrease    15-29  5         Kidney failure     <15    The MDRD GFR formula is only valid for adults with stable renal function between ages 18 and 70.    Magnesium [[id number]]  (Normal) Collected:  10/02/18 1725    Specimen:  Blood Updated:  10/02/18 1752     Magnesium 2.7 mg/dL     POC Glucose Once [[id number]]  (Abnormal) Collected:  10/02/18 1720    Specimen:  Blood Updated:  10/02/18 1722     Glucose 233 (H) mg/dL     Narrative:       Meter: [id number] : [id number] Parker Knapp    POC Glucose Once [[id number]]  (Abnormal) Collected:  10/02/18 1628    Specimen:  Blood Updated:  10/02/18 1630     Glucose 175 (H) mg/dL     Narrative:       Meter: [id number] : [id number] Parker Knapp    Iron Profile [[id number]]  (Abnormal) Collected:  10/02/18 1536    Specimen:  Blood Updated:  10/02/18 1623     Iron 92 mcg/dL      TIBC 147 (L) mcg/dL      Iron Saturation 63 (H) %     Ferritin [[id number]]  (Normal) Collected:  10/02/18 1536    Specimen:  Blood Updated:  10/02/18 1623     Ferritin 85.00 ng/mL     Vitamin B12 [[id number]]  (Abnormal) Collected:  10/02/18 1536    Specimen:  Blood Updated:  10/02/18 1623     Vitamin B-12 74 (L) pg/mL     POC Glucose Once [[id number]]  (Normal) Collected:  10/02/18 1512    Specimen:  Blood Updated:  10/02/18 1521     Glucose 94 mg/dL     Narrative:       Meter: [id number] : [id number] Lida Rehman    Blood Gas, Arterial [[id number]]  (Abnormal) Collected:  10/02/18 1508    Specimen:  Arterial Blood Updated:  10/02/18 1510     Site  Arterial Line     Ulises's Test N/A     pH, Arterial 7.481 (H) pH units      pCO2, Arterial 32.0 (L) mm Hg      pO2, Arterial 480.0 (H) mm Hg      HCO3, Arterial 23.9 mmol/L      Base Excess, Arterial 0.5 mmol/L      Hemoglobin, Blood Gas 7.4 (L) g/dL      Hematocrit, Blood Gas 22.6 %      Oxyhemoglobin 97.7 %      Methemoglobin 1.60 (H) %      Carboxyhemoglobin 0.9 %      CO2 Content 24.9 mmol/L      Barometric Pressure for Blood Gas -- mmHg      Comment: N/A        Modality Adult Vent     FIO2 100 %     CBC (No Diff) [[id number]]  (Abnormal) Collected:  10/02/18 1359    Specimen:  Blood Updated:  10/02/18 1445     WBC 6.32 10*3/mm3      RBC 2.22 (L) 10*6/mm3      Hemoglobin 8.1 (L) g/dL      Hematocrit 23.0 (L) %      .6 (H) fL      MCH 36.5 (H) pg      MCHC 35.2 g/dL      RDW 17.2 (H) %      RDW-SD 63.5 (H) fl      MPV 10.1 fL      Platelets 118 (L) 10*3/mm3     Renal Function Panel [[id number]]  (Abnormal) Collected:  10/02/18 1359    Specimen:  Blood Updated:  10/02/18 1438     Glucose 82 mg/dL      BUN 13 mg/dL      Creatinine 0.94 mg/dL      Sodium 141 mmol/L      Potassium 3.4 (L) mmol/L      Chloride 111 (H) mmol/L      CO2 24.0 mmol/L      Calcium 7.9 (L) mg/dL      Albumin 3.48 g/dL      Phosphorus 2.0 (L) mg/dL      Anion Gap 6.0 mmol/L      BUN/Creatinine Ratio 13.8     eGFR  African Amer 99 mL/min/1.73     Narrative:       National Kidney Foundation Guidelines    Stage     Description        GFR  1         Normal or High     90+  2         Mild decrease      60-89  3         Moderate decrease  30-59  4         Severe decrease    15-29  5         Kidney failure     <15    The MDRD GFR formula is only valid for adults with stable renal function between ages 18 and 70.    Protime-INR [[id number]]  (Abnormal) Collected:  10/02/18 1359    Specimen:  Blood Updated:  10/02/18 1435     Protime 17.1 (H) Seconds      INR 1.63 (H)    Magnesium [[id number]]  (Abnormal) Collected:  10/02/18 7098    Specimen:   Blood Updated:  10/02/18 1435     Magnesium 3.1 (H) mg/dL     aPTT [[id number]]  (Normal) Collected:  10/02/18 1359    Specimen:  Blood Updated:  10/02/18 1434     PTT 27.2 seconds     Narrative:       PTT = The equivalent PTT values for the therapeutic range of heparin levels at 0.3 to 0.5 U/ml are 55 to 70 seconds.    Calcium, Ionized [[id number]]  (Normal) Collected:  10/02/18 1359    Specimen:  Blood Updated:  10/02/18 1426     Ionized Calcium 1.19 mmol/L     POC Glucose Once [[id number]]  (Normal) Collected:  10/02/18 1338    Specimen:  Blood Updated:  10/02/18 1404     Glucose 99 mg/dL     Narrative:       Meter: [id number] : [id number] Parker Knapp    POC Activated Clotting Time [[id number]]  (Normal) Collected:  10/02/18 1323    Specimen:  Blood Updated:  10/02/18 1336     Activated Clotting Time  131 Seconds      Comment: Serial Number: [id number]:  [id number]       POC Activated Clotting Time [[id number]]  (Abnormal) Collected:  10/02/18 1237    Specimen:  Blood Updated:  10/02/18 1336     Activated Clotting Time  698 (H) Seconds      Comment: Serial Number: [id number]:  [id number]       POC Activated Clotting Time [[id number]]  (Abnormal) Collected:  10/02/18 1213    Specimen:  Blood Updated:  10/02/18 1336     Activated Clotting Time  494 (H) Seconds      Comment: Serial Number: [id number]:  [id number]       POC Activated Clotting Time [[id number]]  (Abnormal) Collected:  10/02/18 1141    Specimen:  Blood Updated:  10/02/18 1336     Activated Clotting Time  681 (H) Seconds      Comment: Serial Number: [id number]:  [id number]       POC Activated Clotting Time [[id number]]  (Abnormal) Collected:  10/02/18 1114    Specimen:  Blood Updated:  10/02/18 1335     Activated Clotting Time  543 (H) Seconds      Comment: Serial Number: [id number]:  [id number]       POC Activated Clotting Time [[id number]]  (Abnormal) Collected:  10/02/18 1031    Specimen:  Blood Updated:  10/02/18 1335     Activated Clotting Time  433 (H) Seconds       Comment: Serial Number: [id number]:  [id number]       POC Surgery Labs [[id number]]  (Abnormal) Collected:  10/02/18 1324    Specimen:  Blood Updated:  10/02/18 1332     Ionized Calcium 1.11 (L) mmol/L      Potassium 2.9 (L) mmol/L      Sodium 142 mmol/L      Total CO2 26 mmol/L      Hemoglobin 7.1 (L) g/dL      Hematocrit 21 (L) %      pCO2, Arterial 34.8 (L) mm Hg      pO2, Arterial 309 (H) mmHg      Comment: Serial Number: [id number]:  [id number]        Base Excess 1.0000 mmol/L      O2 Saturation, Arterial 100 (H) %      pH, Arterial 7.46 pH units      HCO3, Arterial 24.8 mmol/L     POC Surgery Labs [[id number]]  (Abnormal) Collected:  10/02/18 1310    Specimen:  Blood Updated:  10/02/18 1332     Ionized Calcium 1.12 (L) mmol/L      Potassium 3.0 (L) mmol/L      Sodium 141 mmol/L      Total CO2 27 mmol/L      Hemoglobin 6.8 (L) g/dL      Hematocrit 20 (L) %      pCO2, Arterial 34.8 (L) mm Hg      pO2, Arterial 355 (H) mmHg      Comment: Serial Number: [id number]:  [id number]        Base Excess 3.0000 mmol/L      O2 Saturation, Arterial 100 (H) %      pH, Arterial 7.49 pH units      HCO3, Arterial 26.4 (H) mmol/L     POC Activated Clotting Time [[id number]]  (Normal) Collected:  10/02/18 1309    Specimen:  Blood Updated:  10/02/18 1332     Activated Clotting Time  125 Seconds      Comment: Serial Number: [id number]:  [id number]       POC Surgery Labs [[id number]]  (Abnormal) Collected:  10/02/18 1238    Specimen:  Blood Updated:  10/02/18 1331     Ionized Calcium 1.14 (L) mmol/L      Potassium 3.8 mmol/L      Sodium 138 mmol/L      Total CO2 27 mmol/L      Hemoglobin 7.1 (L) g/dL      Hematocrit 21 (L) %      pCO2, Arterial 42.0 mm Hg      pO2, Arterial 287 (H) mmHg      Comment: Serial Number: [id number]:  [id number]        Base Excess 1.0000 mmol/L      O2 Saturation, Arterial 100 (H) %      pH, Arterial 7.40 pH units      HCO3, Arterial 26.2 (H) mmol/L     POC Surgery Labs [[id number]]  (Abnormal) Collected:   10/02/18 1214    Specimen:  Blood Updated:  10/02/18 1331     Ionized Calcium 1.14 (L) mmol/L      Potassium 4.3 mmol/L      Sodium 138 mmol/L      Total CO2 32 (H) mmol/L      Hemoglobin 8.2 (L) g/dL      Hematocrit 24 (L) %      pCO2, Arterial 38.1 mm Hg      pO2, Arterial 467 (H) mmHg      Comment: Serial Number: [id number]:  [id number]        Base Excess 8.0000 (H) mmol/L      O2 Saturation, Arterial 100 (H) %      pH, Arterial 7.52 pH units      HCO3, Arterial 30.9 (H) mmol/L     POC Surgery Labs [[id number]]  (Abnormal) Collected:  10/02/18 1126    Specimen:  Blood Updated:  10/02/18 1331     Ionized Calcium 1.11 (L) mmol/L      Potassium 4.3 mmol/L      Sodium 139 mmol/L      Total CO2 33 (H) mmol/L      Hemoglobin 7.1 (L) g/dL      Hematocrit 21 (L) %      pCO2, Arterial 40.2 mm Hg      pO2, Arterial 38 (L) mmHg      Comment: Serial Number: [id number]:  [id number]        Base Excess 9.0000 (H) mmol/L      O2 Saturation, Arterial 78 (L) %      pH, Arterial 7.51 pH units      HCO3, Arterial 31.9 (H) mmol/L     POC Surgery Labs [[id number]]  (Abnormal) Collected:  10/02/18 1115    Specimen:  Blood Updated:  10/02/18 1331     Ionized Calcium 1.06 (L) mmol/L      Potassium 4.3 mmol/L      Sodium 138 mmol/L      Total CO2 35 (H) mmol/L      Hemoglobin 7.1 (L) g/dL      Hematocrit 21 (L) %      pCO2, Arterial 36.4 mm Hg      pO2, Arterial 357 (H) mmHg      Comment: Serial Number: [id number]:  [id number]        Base Excess 12.0000 (H) mmol/L      O2 Saturation, Arterial 100 (H) %      pH, Arterial 7.57 pH units      HCO3, Arterial 33.6 (H) mmol/L     POC Activated Clotting Time [[id number]]  (Abnormal) Collected:  10/02/18 1049    Specimen:  Blood Updated:  10/02/18 1331     Activated Clotting Time  566 (H) Seconds      Comment: Serial Number: [id number]:  [id number]       POC Surgery Labs [[id number]]  (Abnormal) Collected:  10/02/18 1032    Specimen:  Blood Updated:  10/02/18 1331     Ionized Calcium 1.28 mmol/L       Potassium 3.7 mmol/L      Sodium 136 (L) mmol/L      Total CO2 29 mmol/L      Hemoglobin 10.2 (L) g/dL      Hematocrit 30 (L) %      pCO2, Arterial 38.3 mm Hg      pO2, Arterial 481 (H) mmHg      Comment: Serial Number: [id number]:  [id number]        Base Excess 4.0000 mmol/L      O2 Saturation, Arterial 100 (H) %      pH, Arterial 7.47 pH units      HCO3, Arterial 27.9 (H) mmol/L     POC Surgery Labs [[id number]]  (Abnormal) Collected:  10/02/18 0921    Specimen:  Blood Updated:  10/02/18 1330     Ionized Calcium 1.21 mmol/L      Potassium 3.8 mmol/L      Sodium 139 mmol/L      Total CO2 27 mmol/L      Hemoglobin 12.2 g/dL      Hematocrit 36 (L) %      pCO2, Arterial 37.3 mm Hg      pO2, Arterial 75 (L) mmHg      Comment: Serial Number: [id number]:  [id number]        Base Excess 2.0000 mmol/L      O2 Saturation, Arterial 96 %      pH, Arterial 7.46 pH units      HCO3, Arterial 26.2 (H) mmol/L     POC Activated Clotting Time [[id number]]  (Normal) Collected:  10/02/18 0920    Specimen:  Blood Updated:  10/02/18 1330     Activated Clotting Time  125 Seconds      Comment: Serial Number: [id number]:  [id number]       POC Glucose Once [[id number]]  (Normal) Collected:  10/02/18 0516    Specimen:  Blood Updated:  10/02/18 0517     Glucose 101 mg/dL     Narrative:       Meter: [id number] : [id number] [facility]    Urine Drug Screen - [[id number]]  (Normal) Collected:  10/01/18 2320    Specimen:  Urine Updated:  10/02/18 0033     THC, Screen, Urine Negative     Phencyclidine (PCP), Urine Negative     Cocaine Screen, Urine Negative     Methamphetamine, Urine Negative     Opiate Screen Negative     Amphetamine Screen, Urine Negative     Benzodiazepine Screen, Urine Negative     Tricyclic Antidepressants Screen Negative     Methadone Screen, Urine Negative     Barbiturates Screen, Urine Negative     Oxycodone Screen, Urine Negative     Propoxyphene Screen Negative     Buprenorphine, Screen, Urine Negative    Narrative:        Cutoff For Drugs Screened:    Amphetamines               500 ng/ml  Barbiturates               200 ng/ml  Benzodiazepines            150 ng/ml  Cocaine                    150 ng/ml  Methadone                  200 ng/ml  Opiates                    100 ng/ml  Phencyclidine               25 ng/ml  THC                            50 ng/ml  Methamphetamine            500 ng/ml  Tricyclic Antidepressants  300 ng/ml  Oxycodone                  100 ng/ml  Propoxyphene               300 ng/ml  Buprenorphine               10 ng/ml    The normal value for all drugs tested is negative. This report includes unconfirmed screening results, with the cutoff values listed, to be used for medical treatment purposes only.  Unconfirmed results must not be used for non-medical purposes such as employment or legal testing.  Clinical consideration should be applied to any drug of abuse test, particularly when unconfirmed results are used.      Urinalysis With Culture If Indicated - [[id number]]  (Abnormal) Collected:  10/01/18 2320    Specimen:  Urine Updated:  10/01/18 2329     Color, UA Yellow     Appearance, UA Clear     pH, UA 5.5     Specific Gravity, UA 1.024     Glucose, UA Negative     Ketones, UA Trace (A)     Bilirubin, UA Negative     Blood, UA Negative     Protein, UA Negative     Leuk Esterase, UA Negative     Nitrite, UA Negative     Urobilinogen, UA 1.0 E.U./dL    Narrative:       Urine microscopic not indicated.    POC Glucose Once [[id number]]  (Normal) Collected:  10/01/18 2126    Specimen:  Blood Updated:  10/01/18 2128     Glucose 120 mg/dL     Narrative:       Meter: [id number] : [id number] Walter Calhoun    POC Glucose Once [[id number]]  (Normal) Collected:  10/01/18 1625    Specimen:  Blood Updated:  10/01/18 1634     Glucose 100 mg/dL     Narrative:       Meter: [id number] : [id number] Arnaldo Martins    POC Glucose Once [[id number]]  (Abnormal) Collected:  10/01/18 1109    Specimen:  Blood Updated:  10/01/18 1137      Glucose 140 (H) mg/dL     Narrative:       Meter: [id number] : [id number] [facility]    Comprehensive Metabolic Panel [[id number]]  (Abnormal) Collected:  10/01/18 0828    Specimen:  Blood Updated:  10/01/18 0929     Glucose 152 (H) mg/dL      BUN 13 mg/dL      Creatinine 0.97 mg/dL      Sodium 137 mmol/L      Potassium 4.1 mmol/L      Chloride 100 mmol/L      CO2 28.0 mmol/L      Calcium 9.4 mg/dL      Total Protein 7.1 g/dL      Albumin 4.59 g/dL      ALT (SGPT) 30 U/L      AST (SGOT) 33 U/L      Alkaline Phosphatase 72 U/L      Total Bilirubin 1.8 (H) mg/dL      eGFR  African Amer 95 mL/min/1.73      Globulin 2.5 gm/dL      A/G Ratio 1.8 g/dL      BUN/Creatinine Ratio 13.4     Anion Gap 9.0 mmol/L     Narrative:       National Kidney Foundation Guidelines    Stage     Description        GFR  1         Normal or High     90+  2         Mild decrease      60-89  3         Moderate decrease  30-59  4         Severe decrease    15-29  5         Kidney failure     <15    The MDRD GFR formula is only valid for adults with stable renal function between ages 18 and 70.    P2Y12 Platelet Inhibition [[id number]] Collected:  10/01/18 0828    Specimen:  Blood Updated:  10/01/18 0925     P2Y12 Reactivity Unit 170 PRU     Narrative:       P2Y12 Interpretation:  Pre-Drug normal reference range is 194-418 PRU.  Test results are reported in P2Y12 reaction units (PRU). This measures the extent of platelet aggregation in the presence of P2Y12 inhibitor drugs, such as clopidogrel (Plavix), prasugrel (Effient), ticagrelor (Brilinta), ticlopidine (Ticlid).  P2Y12 values <194 PRU (low end of reference range) are specific evidence of a P2Y12 inhibitor effect.  Patients who have been treated with Glycoprotein IIb/IIIa inhibitors should not be tested until platelet function has recovered. This time period is approximately 14 days after discontinuation of abciximab (ReoPro) and up to 48 hours after discontinuation of  eptifibatide (Integrilin) and tirofiban (Aggrastat).   The P2Y12 test results should be interpreted in conjunction with other clinical and lab data available to the clinician.    aPTT [[id number]]  (Normal) Collected:  10/01/18 0829    Specimen:  Blood Updated:  10/01/18 0912     PTT 26.1 seconds     Narrative:       PTT = The equivalent PTT values for the therapeutic range of heparin levels at 0.3 to 0.5 U/ml are 55 to 70 seconds.    Protime-INR [[id number]]  (Normal) Collected:  10/01/18 0829    Specimen:  Blood Updated:  10/01/18 0912     Protime 10.3 Seconds      INR 0.98    CBC (No Diff) [[id number]]  (Abnormal) Collected:  10/01/18 0828    Specimen:  Blood Updated:  10/01/18 0852     WBC 7.04 10*3/mm3      RBC 3.48 (L) 10*6/mm3      Hemoglobin 12.6 (L) g/dL      Hematocrit 36.7 (L) %      .5 (H) fL      MCH 36.2 (H) pg      MCHC 34.3 g/dL      RDW 16.9 (H) %      RDW-SD 63.7 (H) fl      MPV 10.5 fL      Platelets 293 10*3/mm3     POC Glucose Once [[id number]]  (Abnormal) Collected:  10/01/18 0830    Specimen:  Blood Updated:  10/01/18 0840     Glucose 165 (H) mg/dL     Narrative:       Meter: [id number] : [id number] [facility]    POC Glucose Once [[id number]]  (Abnormal) Collected:  09/30/18 2003    Specimen:  Blood Updated:  09/30/18 2006     Glucose 137 (H) mg/dL     Narrative:       Meter: [id number] : [id number] Walter Calhoun    POC Glucose Once [[id number]]  (Normal) Collected:  09/30/18 1519    Specimen:  Blood Updated:  09/30/18 1521     Glucose 77 mg/dL     Narrative:       Meter: [id number] : [id number] michelle dean    POC Glucose Once [[id number]]  (Abnormal) Collected:  09/30/18 1116    Specimen:  Blood Updated:  09/30/18 1117     Glucose 193 (H) mg/dL     Narrative:       Meter: [id number] : [id number] michelle dean    P2Y12 Platelet Inhibition [[id number]] Collected:  09/30/18 0808    Specimen:  Blood Updated:  09/30/18 0925     P2Y12 Reactivity Unit 189 PRU     Narrative:        P2Y12 Interpretation:  Pre-Drug normal reference range is 194-418 PRU.  Test results are reported in P2Y12 reaction units (PRU). This measures the extent of platelet aggregation in the presence of P2Y12 inhibitor drugs, such as clopidogrel (Plavix), prasugrel (Effient), ticagrelor (Brilinta), ticlopidine (Ticlid).  P2Y12 values <194 PRU (low end of reference range) are specific evidence of a P2Y12 inhibitor effect.  Patients who have been treated with Glycoprotein IIb/IIIa inhibitors should not be tested until platelet function has recovered. This time period is approximately 14 days after discontinuation of abciximab (ReoPro) and up to 48 hours after discontinuation of eptifibatide (Integrilin) and tirofiban (Aggrastat).   The P2Y12 test results should be interpreted in conjunction with other clinical and lab data available to the clinician.    POC Glucose Once [[id number]]  (Normal) Collected:  09/30/18 0728    Specimen:  Blood Updated:  09/30/18 0730     Glucose 113 mg/dL     Narrative:       Meter: [id number] : [id number] michelle dean    POC Glucose Once [[id number]]  (Abnormal) Collected:  09/29/18 1957    Specimen:  Blood Updated:  09/29/18 1959     Glucose 205 (H) mg/dL     Narrative:       Meter: [id number] : [id number] Autumn Valladares    POC Glucose Once [[id number]]  (Normal) Collected:  09/29/18 1608    Specimen:  Blood Updated:  09/29/18 1610     Glucose 116 mg/dL     Narrative:       Meter: [id number] : [id number] Elizabeth Coyle    P2Y12 Platelet Inhibition [[id number]] Collected:  09/29/18 1427    Specimen:  Blood Updated:  09/29/18 1541     P2Y12 Reactivity Unit 135 PRU     Narrative:       P2Y12 Interpretation:  Pre-Drug normal reference range is 194-418 PRU.  Test results are reported in P2Y12 reaction units (PRU). This measures the extent of platelet aggregation in the presence of P2Y12 inhibitor drugs, such as clopidogrel (Plavix), prasugrel (Effient), ticagrelor (Brilinta), ticlopidine  (Ticlid).  P2Y12 values <194 PRU (low end of reference range) are specific evidence of a P2Y12 inhibitor effect.  Patients who have been treated with Glycoprotein IIb/IIIa inhibitors should not be tested until platelet function has recovered. This time period is approximately 14 days after discontinuation of abciximab (ReoPro) and up to 48 hours after discontinuation of eptifibatide (Integrilin) and tirofiban (Aggrastat).   The P2Y12 test results should be interpreted in conjunction with other clinical and lab data available to the clinician.    POC Glucose Once [[id number]]  (Normal) Collected:  09/29/18 1225    Specimen:  Blood Updated:  09/29/18 1245     Glucose 86 mg/dL     Narrative:       Meter: [id number] : [id number] Booklris    CBC (No Diff) [[id number]]  (Abnormal) Collected:  09/29/18 0600    Specimen:  Blood Updated:  09/29/18 0801     WBC 5.50 10*3/mm3      RBC 2.86 (L) 10*6/mm3      Hemoglobin 10.3 (L) g/dL      Hematocrit 30.1 (L) %      .2 (H) fL      MCH 36.0 (H) pg      MCHC 34.2 g/dL      RDW 16.8 (H) %      RDW-SD 63.2 (H) fl      MPV 10.4 fL      Platelets 273 10*3/mm3     Hemoglobin A1c [[id number]]  (Abnormal) Collected:  09/29/18 0600    Specimen:  Blood Updated:  09/29/18 0759     Hemoglobin A1C 6.30 (H) %     Narrative:       The American Diabetes Association recommends maintenance of Hemoglobin A1C at 7.0% or lower. Goals for Hemoglobin A1C reduction may need to be modified if hypoglycemia is a problem.    POC Glucose Once [[id number]]  (Normal) Collected:  09/29/18 0723    Specimen:  Blood Updated:  09/29/18 0747     Glucose 82 mg/dL     Narrative:       Meter: [id number] : [id number] Elizabeth Coyle    Troponin [[id number]]  (Normal) Collected:  09/29/18 0600    Specimen:  Blood Updated:  09/29/18 0708     Troponin I <0.006 ng/mL      Comment: Results may be falsely decreased if patient taking Biotin.       Lipid Panel [[id number]]  (Abnormal) Collected:  09/29/18 0600     Specimen:  Blood Updated:  09/29/18 0708     Total Cholesterol 91 mg/dL      Triglycerides 98 mg/dL      HDL Cholesterol 26 (L) mg/dL      LDL Cholesterol  44 mg/dL     Narrative:       Cholesterol Reference Ranges:   Desirable       < 200 mg/dL   Borderline    200-239 mg/dL   High Risk       > 239 mg/dL    Triglyceride Reference Ranges:   Normal          < 150 mg/dL   Borderline    150-199 mg/dL   High          200-499 mg/dL   Very High       > 499 mg/dL    HDL Reference Ranges:   Low              < 40 mg/dL   High             > 59 mg/dL    LDL Reference Ranges:   Optimal         < 100 mg/dL   Near Optimal  100-129 mg/dL   Borderline    130-159 mg/dL   High          160-189 mg/dL   Very High       > 189 mg/dL    Basic Metabolic Panel [[id number]]  (Abnormal) Collected:  09/29/18 0600    Specimen:  Blood Updated:  09/29/18 0708     Glucose 69 (L) mg/dL      BUN 14 mg/dL      Creatinine 0.85 mg/dL      Sodium 141 mmol/L      Potassium 4.1 mmol/L      Chloride 105 mmol/L      CO2 28.0 mmol/L      Calcium 8.7 mg/dL      eGFR  African Amer 111 mL/min/1.73      BUN/Creatinine Ratio 16.5     Anion Gap 8.0 mmol/L     Narrative:       National Kidney Foundation Guidelines    Stage     Description        GFR  1         Normal or High     90+  2         Mild decrease      60-89  3         Moderate decrease  30-59  4         Severe decrease    15-29  5         Kidney failure     <15    The MDRD GFR formula is only valid for adults with stable renal function between ages 18 and 70.    TSH [[id number]]  (Normal) Collected:  09/29/18 0600    Specimen:  Blood Updated:  09/29/18 0708     TSH 1.813 mIU/mL     POC Glucose Once [[id number]]  (Normal) Collected:  09/28/18 2107    Specimen:  Blood Updated:  09/28/18 2109     Glucose 90 mg/dL     Narrative:       Meter: [id number] : [id number] Walter Calhoun    Troponin [[id number]]  (Normal) Collected:  09/28/18 1730    Specimen:  Blood Updated:  09/28/18 1801     Troponin I <0.006  ng/mL      Comment: Results may be falsely decreased if patient taking Biotin.       [facility] [[id number]] Collected:  09/28/18 1421    Specimen:  Blood Updated:  09/28/18 1722    Narrative:       The following orders were created for panel order [facility].  Procedure                               Abnormality         Status                     ---------                               -----------         ------                     Light Blue Top[[id number]]                                   Final result               Green Top (Gel)[[id number]]                                  Final result               Lavender Top[[id number]]                                     Final result               Gold Top - SST[[id number]]                                   Final result               Green Top (No Gel)[[id number]]                                                            Please view results for these tests on the individual orders.    CBC & Differential [[id number]] Collected:  09/28/18 1421    Specimen:  Blood Updated:  09/28/18 1546    Narrative:       The following orders were created for panel order CBC & Differential.  Procedure                               Abnormality         Status                     ---------                               -----------         ------                     Scan Slide[[id number]]                                                                  CBC Auto Differential[[id number]]        Abnormal            Final result                 Please view results for these tests on the individual orders.    CBC Auto Differential [[id number]]  (Abnormal) Collected:  09/28/18 1421    Specimen:  Blood Updated:  09/28/18 1546     WBC 8.66 10*3/mm3      RBC 3.35 (L) 10*6/mm3      Hemoglobin 12.2 (L) g/dL      Hematocrit 35.8 (L) %      .9 (H) fL      MCH 36.4 (H) pg      MCHC 34.1 g/dL      RDW 16.8 (H) %      RDW-SD 64.6 (H) fl      MPV 10.8 fL      Platelets 360 10*3/mm3      Neutrophil % 60.2 %       Lymphocyte % 29.6 %      Monocyte % 6.2 %      Eosinophil % 3.9 (H) %      Basophil % 0.1 %      Immature Grans % 0.2 %      Neutrophils, Absolute 5.21 10*3/mm3      Lymphocytes, Absolute 2.56 10*3/mm3      Monocytes, Absolute 0.54 10*3/mm3      Eosinophils, Absolute 0.34 (H) 10*3/mm3      Basophils, Absolute 0.01 10*3/mm3      Immature Grans, Absolute 0.02 10*3/mm3     Green Top (Gel) [[id number]] Collected:  09/28/18 1421    Specimen:  Blood Updated:  09/28/18 1531     Extra Tube Hold for add-ons.     Comment: Auto resulted.       Gold Top - SST [[id number]] Collected:  09/28/18 1421    Specimen:  Blood Updated:  09/28/18 1531     Extra Tube Hold for add-ons.     Comment: Auto resulted.       Light Blue Top [[id number]] Collected:  09/28/18 1421    Specimen:  Blood Updated:  09/28/18 1531     Extra Tube hold for add-on     Comment: Auto resulted       Lavender Top [[id number]] Collected:  09/28/18 1421    Specimen:  Blood Updated:  09/28/18 1531     Extra Tube hold for add-on     Comment: Auto resulted       POC Troponin, Rapid [[id number]]  (Normal) Collected:  09/28/18 1513    Specimen:  Blood Updated:  09/28/18 1530     Troponin I 0.01 ng/mL      Comment: Serial Number: [id number]:  [id number]       Troponin [[id number]]  (Normal) Collected:  09/28/18 1421    Specimen:  Blood Updated:  09/28/18 1522     Troponin I <0.006 ng/mL      Comment: Results may be falsely decreased if patient taking Biotin.       Comprehensive Metabolic Panel [[id number]]  (Abnormal) Collected:  09/28/18 1421    Specimen:  Blood Updated:  09/28/18 1522     Glucose 97 mg/dL      BUN 12 mg/dL      Creatinine 0.94 mg/dL      Sodium 139 mmol/L      Potassium 4.1 mmol/L      Chloride 100 mmol/L      CO2 24.0 mmol/L      Calcium 9.3 mg/dL      Total Protein 6.8 g/dL      Albumin 4.39 g/dL      ALT (SGPT) 23 U/L      AST (SGOT) 29 U/L      Alkaline Phosphatase 67 U/L      Total Bilirubin 1.4 (H) mg/dL      eGFR  African Amer 99 mL/min/1.73       Globulin 2.4 gm/dL      A/G Ratio 1.8 g/dL      BUN/Creatinine Ratio 12.8     Anion Gap 15.0 (H) mmol/L     Narrative:       National Kidney Foundation Guidelines    Stage     Description        GFR  1         Normal or High     90+  2         Mild decrease      60-89  3         Moderate decrease  30-59  4         Severe decrease    15-29  5         Kidney failure     <15    The MDRD GFR formula is only valid for adults with stable renal function between ages 18 and 70.    Protime-INR [[id number]]  (Normal) Collected:  09/28/18 1421    Specimen:  Blood Updated:  09/28/18 1508     Protime 10.1 Seconds      INR 0.96    D-dimer, Quantitative [[id number]]  (Normal) Collected:  09/28/18 1421    Specimen:  Blood Updated:  09/28/18 1508     D-Dimer, Quantitative 0.33 mg/L (FEU)     Narrative:       Negative predictive value for exclusion of venous thromboembolism: < or = 0.5 mg/L (FEU)          Imaging Results (most recent)     Procedure Component Value Units Date/Time     Liver [[id number]] Updated:  10/09/18 0826    XR Chest 1 View [[id number]] Collected:  10/06/18 1155     Updated:  10/06/18 1349    Narrative:       EXAMINATION: XR CHEST 1 VW - 10/06/2018     INDICATION: I20.0-Unstable angina; I25.110-Atherosclerotic heart disease  of native coronary artery with unstable angina pectoris; Z74.09-Other  reduced mobility.     TECHNIQUE:  Single view frontal chest.     COMPARISONS: 10/5/2018.     FINDINGS: Right IJ vascular sheath has been pulled.  Sternotomy wires  are again noted. Bilateral pleural effusions and adjacent atelectasis,  trace on the left and small moderate volume on the right. No  pneumothorax. Cardiomediastinal silhouette is unchanged.        Impression:       Essentially stable exam.     DICTATED:   10/06/2018  EDITED/ls :   10/06/2018      This report was finalized on 10/6/2018 1:47 PM by Blu Montes.       XR Chest 1 View [[id number]] Collected:  10/05/18 0933     Updated:  10/05/18 0953    Narrative:        EXAMINATION: XR CHEST 1 VW-10/05/2018:      INDICATION: F/U cardiac surgery; I20.0-Unstable angina;  I25.110-Atherosclerotic heart disease of native coronary artery with  unstable angina pectoris; Z74.09-Other reduced mobility.      COMPARISON: 10/04/2018.     FINDINGS: The cardiac silhouette is large. Perihilar and bibasilar  airspace changes with effusions, right greater than left. Left chest  tube has been removed. There is no pneumothorax.           Impression:       A left chest tube has been removed since the previous  examination. There has otherwise been no change.     D:  10/05/2018  E:  10/05/2018     This report was finalized on 10/5/2018 9:51 AM by Dr. Tarik Diallo MD.       XR Chest 1 View [[id number]] Collected:  10/04/18 0833     Updated:  10/04/18 0932    Narrative:       EXAMINATION: XR CHEST 1 VW-10/04/2018:      INDICATION: Post-Op Heart Surgery; I20.0-Unstable angina;  I25.110-Atherosclerotic heart disease of native coronary artery with  unstable angina pectoris; Z74.09-Other reduced mobility.     TECHNIQUE:  Single view frontal chest.     COMPARISONS: 10/03/2018     FINDINGS:  [place]-T[place] catheter has been removed but the IJ sheath on the  right remains in place. The esophagogastric and endotracheal tubes have  been removed. Other support apparatus and surgical remnants are  unchanged. Small layering pleural effusion on the right is unchanged. No  pneumothorax.        Impression:       Hardware removal. Otherwise, no change.     D:  10/04/2018  E:  10/04/2018     This report was finalized on 10/4/2018 9:30 AM by Blu Montes.       XR Chest 1 View [[id number]] Collected:  10/03/18 0820     Updated:  10/03/18 0900    Narrative:       EXAMINATION: XR CHEST 1 VW- 10/03/2018     INDICATION: Post-Op Heart Surgery; I20.0-Unstable angina;  I25.110-Atherosclerotic heart disease of native coronary artery with  unstable angina pectoris      COMPARISON: 10/02/2018     FINDINGS: Support hardware unchanged and  in satisfactory positioning.  Cardiac silhouette borderline enlarged with increasing right basilar  opacifications which may represent layering effusion component and/or  worsening airspace disease/atelectasis.           Impression:       Slight increased hazy opacifications occupying the right  lower lung may represent layering effusion component or worsening  atelectasis versus airspace disease.     D:  10/03/2018  E:  10/03/2018     This report was finalized on 10/3/2018 8:58 AM by Dr. Jay Davenport.       XR Chest 1 View [[id number]] Collected:  10/02/18 1448     Updated:  10/02/18 1643    Narrative:       EXAMINATION: XR CHEST 1 VW- 10/02/2018     INDICATION: Post-Op Check Line & Tube Placement; I20.0-Unstable  angina; I25.110-Atherosclerotic heart disease of native coronary artery  with unstable angina pectoris      COMPARISON: 09/20/2018     FINDINGS: Portable chest reveals patient to be status post median  sternotomy. The lines and tubes are in satisfactory position. Chest tube  identified on the left with no pneumothorax. Degenerative changes seen  within the spine. Pulmonary vascularity is within normal limits. Small  bilateral pleural effusions.           Impression:       Status post median sternotomy with lines and tubes in  satisfactory position. No pneumothorax with small bilateral pleural  effusions.     D:  10/02/2018  E:  10/02/2018     This report was finalized on 10/2/2018 4:41 PM by Dr. Quiana Thapa MD.       XR Chest 2 View [[id number]] Collected:  09/28/18 1625     Updated:  10/01/18 0928    Narrative:       EXAMINATION: XR CHEST 2 VW-09/28/2018:      INDICATION: Chest pain.      COMPARISON: 09/25/2018.     FINDINGS: Cardiac size within normal limits. Increased perihilar lung  markings with peribronchial cuffing consistent with peribronchial  inflammatory process, however, no focal opacification or consolidation  otherwise. No pneumothorax or pleural effusion. Degenerative changes of  the  spine.       Impression:       Perihilar prominence of lung markings consistent with  peribronchial inflammatory process such as reactive airways disease  and/or bronchitis, however, no focal consolidation or overt edema.     D:  09/28/2018  E:  09/28/2018     This report was finalized on 10/1/2018 9:26 AM by Dr. Jay Davenport.           ECG/EMG Results (most recent)     Procedure Component Value Units Date/Time    ECG 12 Lead [[id number]] Collected:  09/28/18 1344     Updated:  10/01/18 0642    ECG 12 Lead [[id number]] Collected:  09/28/18 1640     Updated:  10/01/18 0643    ECG 12 Lead [[id number]] Collected:  09/28/18 2104     Updated:  10/01/18 1056    Narrative:       Test Reason : rhythm change  Blood Pressure : **/** mmHG  Vent. Rate : 055 BPM     Atrial Rate : 055 BPM     P-R Int : 156 ms          QRS Dur : 082 ms      QT Int : 406 ms       P-R-T Axes : 042 025 017 degrees     QTc Int : 388 ms    Sinus bradycardia  Nonspecific T wave abnormality  Abnormal ECG  When compared with ECG of 28-SEP-2018 16:40, (Unconfirmed)  No significant change was found  Confirmed by ATIF NAPOLES MD ([id number]) on 10/1/2018 10:56:12 AM    Referred By:  ELBA           Confirmed By:ATIF NAPOLES MD    ECG 12 Lead [[id number]] Collected:  10/02/18 1354     Updated:  10/02/18 1452    Narrative:       Test Reason : Post-Op Heart Surgery  Blood Pressure : **/** mmHG  Vent. Rate : 104 BPM     Atrial Rate : 104 BPM     P-R Int : 138 ms          QRS Dur : 082 ms      QT Int : 378 ms       P-R-T Axes : 084 065 094 degrees     QTc Int : 497 ms    Sinus tachycardia  Nonspecific T wave abnormality  Abnormal ECG  When compared with ECG of 28-SEP-2018 21:04,  Vent. rate has increased BY  49 BPM  QT has lengthened  Confirmed by ATIF NAPOLES MD ([id number]) on 10/2/2018 2:52:38 PM    Referred By:             Confirmed By:ATIF NAPOLES MD    ECG 12 Lead [[id number]] Collected:  10/03/18 0503     Updated:  10/03/18 0835    Narrative:       Test Reason :  Post-Op Heart Surgery  Blood Pressure : **/** mmHG  Vent. Rate : 089 BPM     Atrial Rate : 089 BPM     P-R Int : 118 ms          QRS Dur : 082 ms      QT Int : 350 ms       P-R-T Axes : 067 034 -16 degrees     QTc Int : 425 ms    Normal sinus rhythm  Nonspecific T wave abnormality  Abnormal ECG  When compared with ECG of 02-OCT-2018 13:54,  Non-specific change in ST segment in Anterolateral leads  Nonspecific T wave abnormality, worse in Inferior leads  QT has shortened  Confirmed by ATIF NAPOLES MD ([id number]) on 10/3/2018 8:35:03 AM    Referred By:             Confirmed By:ATIF NAPOLES MD    ECG 12 Lead [[id number]] Collected:  10/04/18 0440     Updated:  10/04/18 1402    Narrative:       Test Reason : Post-Op Heart Surgery  Blood Pressure : **/** mmHG  Vent. Rate : 091 BPM     Atrial Rate : 091 BPM     P-R Int : 126 ms          QRS Dur : 094 ms      QT Int : 376 ms       P-R-T Axes : 070 029 -22 degrees     QTc Int : 462 ms    Normal sinus rhythm  Incomplete right bundle branch block  Nonspecific T wave abnormality  Prolonged QT  Abnormal ECG  When compared with ECG of 03-OCT-2018 05:03,  No significant change was found  Confirmed by Blu Mari ([id number]) on 10/4/2018 2:02:01 PM    Referred By:             Confirmed By:Blu Mari           Physician Progress Notes (most recent note)      Milla Wilson PA-C at 10/9/2018  7:26 AM          CTS Progress Note      POD 7 s/p CABG x5     LOS: 8 days     Subjective  On room air.  VSS.  Denies shortness of breath.  Reports RUQ pain has improved. GI evaluated yesterday for elevated LFT's.  RUQ ultrasound performed this morning.    Objective    Vital Signs  Temp:  [98.5 °F (36.9 °C)-98.7 °F (37.1 °C)] 98.7 °F (37.1 °C)  Heart Rate:  [] 87  Resp:  [18] 18  BP: (113-131)/(71-80) 119/80    Physical Exam:   General Appearance: alert, appears stated age and cooperative   Lungs: clear to auscultation     Heart: regular rhythm & normal rate, normal S1, S2 and no murmur,  no gallop, no rub   Abdomen:  Soft, mild tenderness to palpation in RUQ   Chest: sternum stable   Skin: Incision c/d/i     Results     Results from last 7 days  Lab Units 10/07/18  0820 10/06/18  0830   WBC 10*3/mm3  --  11.95*   HEMOGLOBIN g/dL 11.1* 10.2*   HEMATOCRIT % 33.8* 31.3*   PLATELETS 10*3/mm3  --  171       Results from last 7 days  Lab Units 10/07/18  1015   SODIUM mmol/L 138   POTASSIUM mmol/L 3.8   CHLORIDE mmol/L 101   CO2 mmol/L 30.0   BUN mg/dL 11   CREATININE mg/dL 0.89   GLUCOSE mg/dL 188*   CALCIUM mg/dL 9.4       Imaging Results (last 24 hours)     ** No results found for the last 24 hours. **          Assessment    * No active hospital problems. *      Plan   RUQ ultrasound per GI.  Appreciate GI.  Lipitor on hold for elevated liver enzymes  Ambulate  Pulm toilet    Possible discharge home today pending GI's recommendations    Milla Wilson PA-C  10/09/18  7:26 AM             Electronically signed by Milla Wilson PA-C at 10/9/2018  9:01 AM          Consult Notes (most recent note)      Clementina Carmona PA at 10/8/2018  3:32 PM      Consult Orders:    1. Inpatient Gastroenterology Consult [[id number]] ordered by Milla Wilson PA-C at 10/08/18 1424           Attestation signed by Jewel Grande MD at 10/8/2018  6:46 PM    I have reviewed the documentation above and agree.    LFT's are improving.  Hepatitis studies are all negative.  Will await US but suspect shock liver                  [facility] Gastroenterology Consult    Referring Provider: Jose Carlos Burdick MD    PCP: Justin Casas APRN    Reason for Consultation: Elevated LFTs    Chief complaint: Chest pain    History of present illness:    Judah Cerna is a 62 y.o. male who is admitted with chest pain and subsequently found to have three vessel coronary artery disease and underwent CABG on 10/2/18.  He tolerated this well.  He was noted on POD 3 to have elevated transaminases (, ) and hyperbilirubinemia  (Bilirubin 2.7).   He reports mild right upper quadrant discomfort but denies postprandial pain, nausea nor vomiting.  He is eating well.  He had a normal bowel movement.  Denies history of liver disease.      Allergies:  Patient has no known allergies.    Scheduled Meds:    aspirin 325 mg Oral Daily   cholecalciferol 50,000 Units Oral Weekly   cyanocobalamin 1,000 mcg Intramuscular Weekly   insulin lispro 0-9 Units Subcutaneous 4x Daily With Meals & Nightly   metoprolol tartrate 25 mg Oral Q12H   pharmacy consult - MTM  Does not apply Daily   sennosides-docusate sodium 2 tablet Oral BID   sodium chloride 3 mL Intravenous Q12H   sodium chloride 3-10 mL Intravenous Q8H        Infusions:       PRN Meds:  •  acetaminophen  •  ALPRAZolam  •  bisacodyl  •  docusate sodium  •  HYDROcodone-acetaminophen  •  ipratropium-albuterol  •  Morphine  •  ondansetron  •  oxyCODONE-acetaminophen  •  potassium & sodium phosphates **OR** potassium & sodium phosphates    Home Meds:  Prescriptions Prior to Admission   Medication Sig Dispense Refill Last Dose   • ALPRAZolam (XANAX) 0.5 MG tablet Take 0.5 mg by mouth Daily As Needed.   Past Week at Unknown time   • atorvastatin (LIPITOR) 40 MG tablet Take 40 mg by mouth Daily.   9/27/2018 at Unknown time   • diclofenac (VOLTAREN) 50 MG EC tablet Take 1 tablet by mouth 3 (Three) Times a Day. 21 tablet 0 9/28/2018 at Unknown time   • glimepiride (AMARYL) 4 MG tablet Take 4 mg by mouth Every Morning Before Breakfast.   Taking   • HYDROcodone-acetaminophen (NORCO) 5-325 MG per tablet Take 1 tablet by mouth 2 (Two) Times a Day As Needed (Knee Pain).   Past Week at Unknown time   • lisinopril (PRINIVIL,ZESTRIL) 10 MG tablet Take 10 mg by mouth Daily.   9/27/2018 at Unknown time   • metFORMIN (GLUCOPHAGE) 1000 MG tablet Take 1,000 mg by mouth Daily With Breakfast.   9/27/2018 at Unknown time   • nebivolol (BYSTOLIC) 5 MG tablet Take 5 mg by mouth Daily.   9/28/2018 at Unknown time   • nystatin  (MYCOSTATIN) 065310 UNIT/ML suspension Swish and swallow 500,000 Units 2 (Two) Times a Day As Needed.   9/27/2018 at Unknown time       ROS: Review of Systems   Constitutional: Positive for fatigue. Negative for appetite change.   HENT: Positive for sinus pressure.    Eyes: Negative.    Respiratory: Positive for chest tightness.    Cardiovascular: Positive for chest pain.   Gastrointestinal: Negative for constipation, diarrhea, nausea and vomiting.   Endocrine: Negative.    Genitourinary: Negative.    Musculoskeletal: Negative.    Skin: Negative.    Neurological: Negative.    Hematological: Negative.    Psychiatric/Behavioral: Negative.        PAST MED HX:  Past Medical History:   Diagnosis Date   • CAD (coronary artery disease)    • Dyslipidemia    • Hypertension    • Type 2 diabetes mellitus (CMS/HCC)        PAST SURG HX:  Past Surgical History:   Procedure Laterality Date   • APPENDECTOMY      w/ partial bowel resection due to ruptured appendix   • CARDIAC CATHETERIZATION     • CARDIAC CATHETERIZATION N/A 9/29/2018    Procedure: Left Heart Cath;  Surgeon: Nadir Tanner MD;  Location:  [facility];  Service: Cardiology   • CORONARY ARTERY BYPASS GRAFT N/A 10/2/2018    Procedure: MEDIAN STERNOTOMY,CORONARY ARTERY BYPASS WITH INTERNAL MAMMARY ARTERY GRAFT  X 5, EVH OF THE RIGHT GREATER SAPHENOUS VEIN;  Surgeon: Shane Bosch MD;  Location: [facility] OR;  Service: Cardiothoracic   • CORONARY STENT PLACEMENT      x 2       FAM HX:  History reviewed. No pertinent family history.    SOC HX:  Social History     Social History   • Marital status:      Spouse name: N/A   • Number of children: N/A   • Years of education: N/A     Occupational History   • Not on file.     Social History Main Topics   • Smoking status: Never Smoker   • Smokeless tobacco: Never Used   • Alcohol use No   • Drug use: No   • Sexual activity: Defer     Other Topics Concern   • Not on file     Social History Narrative   •  "No narrative on file       PHYSICAL EXAM  /74   Pulse 106   Temp 98.1 °F (36.7 °C) (Oral)   Resp 16   Ht 165.1 cm (65\")   Wt 73.2 kg (161 lb 6.4 oz)   SpO2 96%   BMI 26.86 kg/m²    Wt Readings from Last 3 Encounters:   10/08/18 73.2 kg (161 lb 6.4 oz)   09/24/18 72.6 kg (160 lb)   06/13/18 72.6 kg (160 lb)   ,body mass index is 26.86 kg/m².  Physical Exam   Constitutional: He is oriented to person, place, and time. He appears well-developed and well-nourished.   HENT:   Head: Normocephalic and atraumatic.   Eyes: No scleral icterus.   Neck: Normal range of motion.   Cardiovascular: Normal rate and regular rhythm.    Pulmonary/Chest: Effort normal. No respiratory distress.   Abdominal: Soft. Bowel sounds are normal. He exhibits no distension. There is no tenderness.   Musculoskeletal: Normal range of motion.   Neurological: He is alert and oriented to person, place, and time.   Psychiatric: He has a normal mood and affect. His behavior is normal.   Nursing note and vitals reviewed.      Results Review:   I reviewed the patient's new clinical results.    Lab Results   Component Value Date    WBC 11.95 (H) 10/06/2018    HGB 11.1 (L) 10/07/2018    HGB 10.2 (L) 10/06/2018    HGB 9.1 (L) 10/05/2018    HCT 33.8 (L) 10/07/2018    .0 (H) 10/06/2018     10/06/2018       Lab Results   Component Value Date    INR 1.30 (H) 10/04/2018    INR 1.33 (H) 10/03/2018    INR 1.31 (H) 10/03/2018       Lab Results   Component Value Date    GLUCOSE 188 (H) 10/07/2018    BUN 11 10/07/2018    CREATININE 0.89 10/07/2018    EGFRIFAFRI 105 10/07/2018    BCR 12.4 10/07/2018    CO2 30.0 10/07/2018    CALCIUM 9.4 10/07/2018    ALBUMIN 4.13 10/06/2018    ALKPHOS 89 10/06/2018    BILITOT 2.7 (H) 10/06/2018     (H) 10/06/2018     (H) 10/06/2018       ASSESSMENTS/PLANS    1. Elevated LFTs postoperatively  2. CAD s/p CABG, POD #6  3. Right upper quadrant pain, mild     Suspect shock liver.       >>> Repeat LFTs " today  >>> Obtain hepatitis panel  >>> RUQ ultrasound     I discussed the patients findings and my recommendations with patient    GREGG Crandall  10/08/18  3:33 PM      Electronically signed by Jewel Grande MD at 10/8/2018  6:46 PM

## 2018-10-09 NOTE — PROGRESS NOTES
"[facility]  IP Progress Note   LOS: 8 days   Patient Care Team:  Justin Casas APRN as PCP - General (Family Medicine)    Chief Complaint: Follow up for Coronary Artery Disease    Subjective Denies Chest Pain Denies Dyspnea Eating OK Ambulating        Tele: Sinus Rythym    Vitals:  Blood pressure 104/73, pulse 102, temperature 98.1 °F (36.7 °C), temperature source Oral, resp. rate 18, height 165.1 cm (65\"), weight 72.4 kg (159 lb 9.6 oz), SpO2 96 %.     Intake/Output Summary (Last 24 hours) at 10/09/18 0953  Last data filed at 10/08/18 1700   Gross per 24 hour   Intake              960 ml   Output                0 ml   Net              960 ml       Physical Exam:      General: alert, no acute distress, acyanotic, well developed, well nourished   Chest: Clear Auscultation and No Wheezes   CV: Heart sounds are normal.  Regular rate and rhythm without murmur, gallop or rub.   Extremities: negative    Results Review:     I reviewed the patient's new clinical results.      Results from last 7 days  Lab Units 10/07/18  0820 10/06/18  0830   WBC 10*3/mm3  --  11.95*   HEMOGLOBIN g/dL 11.1* 10.2*   HEMATOCRIT % 33.8* 31.3*   PLATELETS 10*3/mm3  --  171       Results from last 7 days  Lab Units 10/08/18  1612 10/07/18  1015   SODIUM mmol/L  --  138   POTASSIUM mmol/L  --  3.8   CHLORIDE mmol/L  --  101   CO2 mmol/L  --  30.0   BUN mg/dL  --  11   CREATININE mg/dL  --  0.89   CALCIUM mg/dL  --  9.4   BILIRUBIN mg/dL 1.6*  --    ALK PHOS U/L 99  --    ALT (SGPT) U/L 266*  --    AST (SGOT) U/L 50*  --    GLUCOSE mg/dL  --  188*       Scheduled Meds:  aspirin 325 mg Oral Daily   cholecalciferol 50,000 Units Oral Weekly   cyanocobalamin 1,000 mcg Intramuscular Weekly   insulin lispro 0-9 Units Subcutaneous 4x Daily With Meals & Nightly   metoprolol tartrate 25 mg Oral Q12H   pharmacy consult - [place]  Does not apply Daily   sennosides-docusate sodium 2 tablet Oral BID   sodium chloride 3 mL " Intravenous Q12H   sodium chloride 3-10 mL Intravenous Q8H       Assessment:  CAD, status post CABG ×5, normal EF. POD #7  Hypertension, stable  Dyslipidemia, statin DC'd  due to elevated liver enzymes.  Elevated Liver Enzymes, greatly improved and continuing to normalize     Plan:    1. Increase activities  2. No Statin for now s/t transaminitis   3. Monitor for recovery of liver function. Recheck CMP in am  4. Progressing well overall           GREGG Amaya  10/09/18  9:53 AM

## 2018-10-09 NOTE — PLAN OF CARE
Problem: Patient Care Overview  Goal: Plan of Care Review  Outcome: Ongoing (interventions implemented as appropriate)   10/09/18 0510   Coping/Psychosocial   Plan of Care Reviewed With patient   Plan of Care Review   Progress improving   OTHER   Outcome Summary npo for abdominal us   Coping/Psychosocial   Patient Agreement with Plan of Care agrees       Problem: Cardiac Surgery (Adult)  Goal: Signs and Symptoms of Listed Potential Problems Will be Absent, Minimized or Managed (Cardiac Surgery)  Outcome: Ongoing (interventions implemented as appropriate)

## 2018-10-10 ENCOUNTER — READMISSION MANAGEMENT (OUTPATIENT)
Dept: CALL CENTER | Facility: HOSPITAL | Age: 63
End: 2018-10-10

## 2018-10-10 NOTE — OUTREACH NOTE
Prep Survey      Responses   Facility patient discharged from?  [place]   Is patient eligible?  Yes   Discharge diagnosis  CABGx5   Does the patient have one of the following disease processes/diagnoses(primary or secondary)?  Cardiothoracic surgery   Does the patient have Home health ordered?  No   Is there a DME ordered?  No   Prep survey completed?  Yes          Nora John RN

## 2018-10-11 ENCOUNTER — READMISSION MANAGEMENT (OUTPATIENT)
Dept: CALL CENTER | Facility: HOSPITAL | Age: 63
End: 2018-10-11

## 2018-10-11 NOTE — OUTREACH NOTE
CT Surgery Week 1 Survey      Responses   Facility patient discharged from?  [place]   Does the patient have one of the following disease processes/diagnoses(primary or secondary)?  Cardiothoracic surgery   Is there a successful TCM telephone encounter documented?  No   Week 1 attempt successful?  No   Unsuccessful attempts  Attempt 1            Love Bardales RN

## 2018-10-16 ENCOUNTER — READMISSION MANAGEMENT (OUTPATIENT)
Dept: CALL CENTER | Facility: HOSPITAL | Age: 63
End: 2018-10-16

## 2018-10-16 NOTE — OUTREACH NOTE
CT Surgery Week 1 Survey      Responses   Facility patient discharged from?  [place]   Does the patient have one of the following disease processes/diagnoses(primary or secondary)?  Cardiothoracic surgery   Is there a successful TCM telephone encounter documented?  No   Week 1 attempt successful?  Yes   Call start time  0747   Rescheduled  Rescheduled-pt requested   Call end time  0748            Loni Catherine RN

## 2018-10-16 NOTE — DISCHARGE SUMMARY
CTS Discharge Summary    Patient Care Team:  Justin Casas APRN as PCP - General (Family Medicine)  Consults:   Consults     Date and Time Order Name Status Description    10/8/2018 1424 Inpatient Gastroenterology Consult Completed     9/29/2018 1319 Inpatient Cardiothoracic Surgery Consult Completed           Date of Admission: 9/28/2018  1:42 PM  Date of Discharge:  10/9/2018    Discharge Diagnosis  Past Medical History:   Diagnosis Date   • CAD (coronary artery disease)    • Dyslipidemia    • Hypertension    • Type 2 diabetes mellitus (CMS/HCC)      Unstable angina (CMS/HCC) [I20.0]  Coronary artery disease [I25.10]     Procedures Performed  Procedure(s):  MEDIAN STERNOTOMY,CORONARY ARTERY BYPASS WITH INTERNAL MAMMARY ARTERY GRAFT  X 5, EVH OF THE RIGHT GREATER SAPHENOUS VEIN       History of Present Illness  Patient is a 62 y.o. male with a significant past history of coronary artery disease (previous stent ×2).  Complains of recent onset right sided chest pain which he describes as burning in nature associated with exercise.  Also has shortness of breath but denies nausea and vomiting he has not had syncope.  These symptoms have been present for at least 2 weeks.  His symptoms have progressed during that period of time patient is a nonsmoker.  Cardiac catheterization was performed and revealed significant three-vessel coronary artery disease with 100% RCA, 80% LAD, and 90% codominant circumflex.  After consultation with the patient and in concert with Dr. Tanner it was determined that the patient would benefit from coronary artery bypass grafting.      Hospital Course  Patient was taken to the operating room on 10/2/2018 for CABG x5 with EVH.  Patient was transported to cardiac ICU intubated and in stable condition.  Extubated per ICU protocol.  POD 1:  Hallie, arterial line discontinued  POD 2:  On low dose levophed for support.  Chest tubes and pacing wires removed.  POD 3:  Ambulating well.  Transferred to  telemetry.  POD 4: RUQ pain with elevated LFTs.  Lipitor held.  POD 5:  RUQ pain improved  POD 6:  GI consult.  POD 7:  Hepatitis panel, abdominal ultrasound unremarkable.  Patient met discharge criteria and was discharged home      Discharge Medications     Discharge Medications      New Medications      Instructions Start Date   aspirin  MG tablet   325 mg, Oral, Daily      sennosides-docusate sodium 8.6-50 MG tablet  Commonly known as:  SENOKOT-S   2 tablets, Oral, 2 Times Daily      vitamin B-12 500 MCG tablet  Commonly known as:  CYANOCOBALAMIN   500 mcg, Oral, Daily      Vitamin D3 85454 units capsule   50,000 Units, Oral, Weekly         Continue These Medications      Instructions Start Date   ALPRAZolam 0.5 MG tablet  Commonly known as:  XANAX   0.5 mg, Oral, Daily PRN      glimepiride 4 MG tablet  Commonly known as:  AMARYL   4 mg, Oral, Every Morning Before Breakfast      HYDROcodone-acetaminophen 5-325 MG per tablet  Commonly known as:  NORCO   1 tablet, Oral, 2 Times Daily PRN      metFORMIN 1000 MG tablet  Commonly known as:  GLUCOPHAGE   1,000 mg, Oral, Daily With Breakfast      nebivolol 5 MG tablet  Commonly known as:  BYSTOLIC   5 mg, Oral, Daily      nystatin 819666 UNIT/ML suspension  Commonly known as:  MYCOSTATIN   500,000 Units, Swish & Swallow, 2 Times Daily PRN         Stop These Medications    atorvastatin 40 MG tablet  Commonly known as:  LIPITOR     diclofenac 50 MG EC tablet  Commonly known as:  VOLTAREN     lisinopril 10 MG tablet  Commonly known as:  PRINIVIL,ZESTRIL            Discharge Diet:   Diet Instructions     Diet: Consistent Carbohydrate, Cardiac       Discharge Diet:   Consistent Carbohydrate  Cardiac             Activity at Discharge:   Activity Instructions     Discharge Activity Restrictions       1) No driving for 4 weeks and no longer taking narcotics.   2) Do not lift / push / pull more than 10 lbs.          Follow-up Appointments  Future Appointments  Date Time  Provider Department Center   11/1/2018 10:30 AM Shane Bosch MD [facility] None   11/6/2018 9:00 AM ORIENTATION -  [facility]  [facility]   11/16/2018 3:15 PM Kay Stevens MD [facility] None           Milla Wilson PA-C  10/16/18  10:02 AM

## 2018-10-18 ENCOUNTER — READMISSION MANAGEMENT (OUTPATIENT)
Dept: CALL CENTER | Facility: HOSPITAL | Age: 63
End: 2018-10-18

## 2018-10-18 NOTE — OUTREACH NOTE
CT Surgery Week 1 Survey      Responses   Facility patient discharged from?  [place]   Does the patient have one of the following disease processes/diagnoses(primary or secondary)?  Cardiothoracic surgery   Is there a successful TCM telephone encounter documented?  No   Week 1 attempt successful?  Yes   Call start time  1028   Call end time  1038   Is patient permission given to speak with other caregiver?  Yes   List who call center can speak with  any family member   Meds reviewed with patient/caregiver?  Yes   Is the patient having any side effects they believe may be caused by any medication additions or changes?  No   Does the patient have all medications related to this admission filled (includes all antibiotics, pain medications, cardiac medications, etc.)  Yes   Is the patient taking all medications as directed (includes completed medication regime)?  Yes   Comments regarding appointments  saw Dr Casas on 10/16/18   Does the patient have a primary care provider?   Yes   Does the patient have an appointment scheduled with their C/T surgeon?  Yes   Has the patient kept scheduled appointments due by today?  Yes   Has home health visited the patient within 72 hours of discharge?  N/A   Psychosocial issues?  No   Did the patient receive a copy of their discharge instructions?  Yes   Nursing interventions  Reviewed instructions with patient, Educated on MyChart   What is the patient's perception of their health status since discharge?  Improving   Nursing interventions  Nurse provided patient education   Is the patient/caregiver able to teach back normal signs of recovery?  Nausea and lack of appetite, Constipation, Depression or irritability, Pain or discomfort at incisional site   Nursing interventions  Reassured on normal signs of recovery   Is the patient /caregiver able to teach back basic post-op care?  Continue use of incentive spirometry at least 1 week post discharge, Practice 'cough and deep breath',  Drive as instructed by MD in discharge instructions, Take showers only when approved by MD-sponge bathe until then, No tub bath, swimming, or hot tub until instructed by MD, Keep incision areas clean, dry and protected, Do not remove steri-strips, Lifting as instructed by MD in discharge instructions   Is the patient/caregiver able to teach back signs and symptoms of incisional infection?  Increased redness, swelling or pain at the incisonal site, Increased drainage or bleeding, Incisional warmth, Pus or odor from incision, Fever   Is the patient/caregiver able to teach back steps to recovery at home?  Set small, achievable goals for return to baseline health, Rest and rebuild strength, gradually increase activity, Weigh daily, Practice good oral hygiene, Eat a well-balance diet, Make a list of questions for surgeon's appointment   Is the patient /caregiver able to teach back the importance of cardiac rehab?  Yes   Is the patient/caregiver able to teach back the hierarchy of who to call/visit for symptoms/problems? PCP, Specialist, Home health nurse, Urgent Care, ED, 911  Yes   Additional teach back comments  Reviewed need to call PCP if weight gain of 2# over 24 hours or 5# over one week-voiced understanding.   Week 1 call completed?  Yes   Wrap up additional comments  [facility] is doing well-[facility] has seen PCP who is pleased with his progress. [facility] incisions are clean, dry, intact without redness or swelling. [facility] starts cardiac rehab 11/06/18. Denies any problems today.            Arlet Mcleod RN

## 2018-10-26 ENCOUNTER — READMISSION MANAGEMENT (OUTPATIENT)
Dept: CALL CENTER | Facility: HOSPITAL | Age: 63
End: 2018-10-26

## 2018-10-26 NOTE — OUTREACH NOTE
CT Surgery Week 2 Survey      Responses   Facility patient discharged from?  [place]   Does the patient have one of the following disease processes/diagnoses(primary or secondary)?  Cardiothoracic surgery   Week 2 attempt successful?  Yes   Call start time  1425   Call end time  1429   Person spoke with today (if not patient) and relationship  Fe Cerna spouse   Meds reviewed with patient/caregiver?  Yes   Is the patient taking all medications as directed (includes completed medication regime)?  Yes   Comments regarding PCP  Dr. Justin Casas   Has the patient kept scheduled appointments due by today?  Yes   Psychosocial issues?  No   What is the patient's perception of their health status since discharge?  Improving   Week 2 call completed?  Yes          Amarilis Lemus RN

## 2018-10-31 DIAGNOSIS — Z95.1 S/P CABG (CORONARY ARTERY BYPASS GRAFT): Primary | ICD-10-CM

## 2018-11-01 ENCOUNTER — HOSPITAL ENCOUNTER (OUTPATIENT)
Dept: GENERAL RADIOLOGY | Facility: HOSPITAL | Age: 63
Discharge: HOME | End: 2018-11-01
Attending: THORACIC SURGERY (CARDIOTHORACIC VASCULAR SURGERY) | Admitting: THORACIC SURGERY (CARDIOTHORACIC VASCULAR SURGERY)

## 2018-11-01 ENCOUNTER — OFFICE VISIT (OUTPATIENT)
Dept: CARDIAC SURGERY | Facility: CLINIC | Age: 63
End: 2018-11-01

## 2018-11-01 VITALS
BODY MASS INDEX: 24.89 KG/M2 | DIASTOLIC BLOOD PRESSURE: 73 MMHG | HEART RATE: 69 BPM | WEIGHT: 149.4 LBS | SYSTOLIC BLOOD PRESSURE: 109 MMHG | HEIGHT: 65 IN | OXYGEN SATURATION: 97 % | TEMPERATURE: 97.9 F

## 2018-11-01 DIAGNOSIS — Z95.1 S/P CABG (CORONARY ARTERY BYPASS GRAFT): ICD-10-CM

## 2018-11-01 DIAGNOSIS — E11.8 TYPE 2 DIABETES MELLITUS WITH COMPLICATION, WITHOUT LONG-TERM CURRENT USE OF INSULIN (HCC): ICD-10-CM

## 2018-11-01 DIAGNOSIS — Z95.1 S/P CABG (CORONARY ARTERY BYPASS GRAFT): Primary | ICD-10-CM

## 2018-11-01 PROCEDURE — 71046 X-RAY EXAM CHEST 2 VIEWS: CPT

## 2018-11-01 PROCEDURE — 99024 POSTOP FOLLOW-UP VISIT: CPT | Performed by: THORACIC SURGERY (CARDIOTHORACIC VASCULAR SURGERY)

## 2018-11-01 NOTE — PROGRESS NOTES
11/01/2018  Patient Information  Judah Cerna                                                                                          [address] [facility] [address]   1955  'PCP/Referring Physician'  Justin Casas, APRN  [phone]  No ref. provider found    Chief Complaint   Patient presents with   • Coronary Artery Disease     Hospital follow-up s/p CABG 10/2/18.     CC: I feel great  History of Present Illness: 62-year-old -American male now 1 month postoperative coronary artery bypass grafting.  The patient has done exceptionally well.  He is off febrile.  He has not had fever, chills, or night sweats.  He denies wound erythema, wound tenderness, or wound drainage.  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   • CARDIAC CATHETERIZATION     • CARDIAC CATHETERIZATION N/A 9/29/2018    Procedure: Left Heart Cath;  Surgeon: Nadir Tanner MD;  Location: [facility] CATH INVASIVE LOCATION;  Service: Cardiology   • CORONARY ARTERY BYPASS GRAFT N/A 10/2/2018    Procedure: MEDIAN STERNOTOMY,CORONARY ARTERY BYPASS WITH INTERNAL MAMMARY ARTERY GRAFT  X 5, EVH OF THE RIGHT GREATER SAPHENOUS VEIN;  Surgeon: Shane Bosch MD;  Location: [facility] OR;  Service: Cardiothoracic   • CORONARY STENT PLACEMENT      x 2       Current Outpatient Prescriptions:   •  ALPRAZolam (XANAX) 0.5 MG tablet, Take 0.5 mg by mouth Daily As Needed., Disp: , Rfl:   •  aspirin 325 MG EC tablet, Take 1 tablet by mouth Daily., Disp: 30 tablet, Rfl:  1  •  Cholecalciferol (VITAMIN D3) 27375 units capsule, Take 1 capsule by mouth 1 (One) Time Per Week., Disp: 4 capsule, Rfl: 0  •  glimepiride (AMARYL) 4 MG tablet, Take 4 mg by mouth Every Morning Before Breakfast., Disp: , Rfl:   •  HYDROcodone-acetaminophen (NORCO) 5-325 MG per tablet, Take 1 tablet by mouth 2 (Two) Times a Day As Needed (Knee Pain)., Disp: , Rfl:   •  metFORMIN (GLUCOPHAGE) 1000 MG tablet, Take 1,000 mg by mouth Daily With Breakfast., Disp: , Rfl:   •  nebivolol (BYSTOLIC) 5 MG tablet, Take 5 mg by mouth Daily., Disp: , Rfl:   •  sennosides-docusate sodium (SENOKOT-S) 8.6-50 MG tablet, Take 2 tablets by mouth 2 (Two) Times a Day., Disp: 60 tablet, Rfl: 0  •  vitamin B-12 (CYANOCOBALAMIN) 500 MCG tablet, Take 1 tablet by mouth Daily., Disp: 30 tablet, Rfl: 0  •  nystatin (MYCOSTATIN) 950539 UNIT/ML suspension, Swish and swallow 500,000 Units 2 (Two) Times a Day As Needed., Disp: , Rfl:   No Known Allergies  Social History     Social History   • Marital status:      Spouse name: N/A   • Number of children: 2   • Years of education: N/A     Occupational History   •       Social History Main Topics   • Smoking status: Never Smoker   • Smokeless tobacco: Never Used   • Alcohol use No   • Drug use: No   • Sexual activity: Defer     Other Topics Concern   • Not on file     Social History Narrative    Lives in [place], [place] with spouse and grandson.     History reviewed. No pertinent family history.  Review of Systems   Constitution: Negative for chills, fever, malaise/fatigue, night sweats and weight loss.   HENT: Negative for hearing loss, odynophagia and sore throat.    Eyes: Negative for blurred vision, vision loss in left eye, vision loss in right eye and visual disturbance.   Cardiovascular: Negative for chest pain, dyspnea on exertion, leg swelling, orthopnea and palpitations.   Respiratory: Negative for cough, hemoptysis, shortness of breath and wheezing.    Endocrine:  "Negative for cold intolerance, heat intolerance, polydipsia, polyphagia and polyuria.   Hematologic/Lymphatic: Does not bruise/bleed easily.   Skin: Negative for itching and rash.   Musculoskeletal: Negative for joint pain, joint swelling and myalgias.   Gastrointestinal: Negative for abdominal pain, constipation, diarrhea, hematemesis, hematochezia, melena, nausea and vomiting.   Genitourinary: Negative for dysuria, frequency and hematuria.   Neurological: Negative for focal weakness, headaches, numbness and seizures.   Psychiatric/Behavioral: Negative for altered mental status and suicidal ideas. The patient is not nervous/anxious.    All other systems reviewed and are negative.    Vitals:    11/01/18 1048   BP: 109/73   BP Location: Right arm   Patient Position: Sitting   Pulse: 69   Temp: 97.9 °F (36.6 °C)   SpO2: 97%   Weight: 67.8 kg (149 lb 6.4 oz)   Height: 165.1 cm (65\")      Physical Exam   Constitutional: He is oriented to person, place, and time. He appears well-developed and well-nourished. No distress.   HENT:   Head: Normocephalic.   Right Ear: External ear normal.   Left Ear: External ear normal.   Nose: Nose normal.   Eyes: Pupils are equal, round, and reactive to light.   Neck: Normal range of motion. Neck supple. No tracheal deviation present. No thyromegaly present.   Cardiovascular: Normal rate, regular rhythm, normal heart sounds and intact distal pulses.    No murmur heard.  Pulmonary/Chest: Effort normal and breath sounds normal. No respiratory distress. He has no wheezes. He has no rales. He exhibits no tenderness.   Abdominal: Soft. Bowel sounds are normal. He exhibits no distension and no mass. There is no tenderness.   Musculoskeletal: Normal range of motion. He exhibits no edema.   Sternum is stable.  No erythema and no drainage.   Lymphadenopathy:     He has no cervical adenopathy.   Neurological: He is alert and oriented to person, place, and time. He has normal reflexes. No cranial " nerve deficit.   Skin: Skin is warm and dry. No rash noted. No erythema.   Psychiatric: He has a normal mood and affect.       Labs/Imaging: PA and lateral chest x-[facility] done at [facility] reviewed.  Chest x-rays are normal    Assessment: Stable post CABG.    Plan: Return to clinic in 3 months.  Continue close follow-up with cardiology    Patient Active Problem List   Diagnosis   • CAD (coronary artery disease)   • Hypertension   • Dyslipidemia   • Type 2 diabetes mellitus (CMS/HCC)   • Abnormal EKG         Shane Bosch MD  CTSurgery  11/01/18   2:02 PM

## 2018-11-06 ENCOUNTER — APPOINTMENT (OUTPATIENT)
Dept: CARDIAC REHAB | Facility: HOSPITAL | Age: 63
End: 2018-11-06

## 2018-11-07 ENCOUNTER — READMISSION MANAGEMENT (OUTPATIENT)
Dept: CALL CENTER | Facility: HOSPITAL | Age: 63
End: 2018-11-07

## 2018-11-07 NOTE — OUTREACH NOTE
CT Surgery Week 3 Survey      Responses   Facility patient discharged from?  [place]   Does the patient have one of the following disease processes/diagnoses(primary or secondary)?  Cardiothoracic surgery   Week 3 attempt successful?  Yes   Call start time  0925   Call end time  0930   Discharge diagnosis  CABGx5   Meds reviewed with patient/caregiver?  Yes   Is the patient taking all medications as directed (includes completed medication regime)?  Yes   Has the patient kept scheduled appointments due by today?  Yes   Comments  Saw surgeon and has been released to drive.   Psychosocial issues?  No   What is the patient's perception of their health status since discharge?  Improving   Week 3 call completed?  Yes          Noris Thomas RN

## 2018-11-12 ENCOUNTER — TELEPHONE (OUTPATIENT)
Dept: CARDIAC SURGERY | Facility: CLINIC | Age: 63
End: 2018-11-12

## 2018-11-12 NOTE — TELEPHONE ENCOUNTER
PT HAS NOT RECEIVED RECALL LETTER FOR 3 MON F/U WITH CLAUDIO BUT WOULD LIKE TO BE SCHEDULED, PT VERIFIED PHONE NUMBER, ADDRESS, AND INSURANCE

## 2018-11-14 ENCOUNTER — READMISSION MANAGEMENT (OUTPATIENT)
Dept: CALL CENTER | Facility: HOSPITAL | Age: 63
End: 2018-11-14

## 2018-11-14 NOTE — OUTREACH NOTE
CT Surgery Week 4 Survey      Responses   Facility patient discharged from?  [place]   Does the patient have one of the following disease processes/diagnoses(primary or secondary)?  Cardiothoracic surgery   Week 4 attempt successful?  No          Yolanda Montanez RN

## 2018-11-16 ENCOUNTER — APPOINTMENT (OUTPATIENT)
Dept: LAB | Facility: HOSPITAL | Age: 63
End: 2018-11-16

## 2018-11-16 ENCOUNTER — OFFICE VISIT (OUTPATIENT)
Dept: CARDIOLOGY | Facility: CLINIC | Age: 63
End: 2018-11-16

## 2018-11-16 VITALS
WEIGHT: 152.6 LBS | HEART RATE: 69 BPM | SYSTOLIC BLOOD PRESSURE: 130 MMHG | OXYGEN SATURATION: 96 % | DIASTOLIC BLOOD PRESSURE: 86 MMHG | HEIGHT: 65 IN | BODY MASS INDEX: 25.43 KG/M2

## 2018-11-16 DIAGNOSIS — I10 ESSENTIAL HYPERTENSION: ICD-10-CM

## 2018-11-16 DIAGNOSIS — I25.810 CORONARY ARTERY DISEASE INVOLVING CORONARY BYPASS GRAFT OF NATIVE HEART WITHOUT ANGINA PECTORIS: Primary | ICD-10-CM

## 2018-11-16 DIAGNOSIS — E78.5 DYSLIPIDEMIA: ICD-10-CM

## 2018-11-16 LAB
ALBUMIN SERPL-MCNC: 4.47 G/DL (ref 3.2–4.8)
ALBUMIN/GLOB SERPL: 1.7 G/DL (ref 1.5–2.5)
ALP SERPL-CCNC: 92 U/L (ref 25–100)
ALT SERPL W P-5'-P-CCNC: 30 U/L (ref 7–40)
ANION GAP SERPL CALCULATED.3IONS-SCNC: 8 MMOL/L (ref 3–11)
AST SERPL-CCNC: 23 U/L (ref 0–33)
BILIRUB SERPL-MCNC: 0.7 MG/DL (ref 0.3–1.2)
BUN BLD-MCNC: 13 MG/DL (ref 9–23)
BUN/CREAT SERPL: 13.5 (ref 7–25)
CALCIUM SPEC-SCNC: 9.9 MG/DL (ref 8.7–10.4)
CHLORIDE SERPL-SCNC: 102 MMOL/L (ref 99–109)
CO2 SERPL-SCNC: 27 MMOL/L (ref 20–31)
CREAT BLD-MCNC: 0.96 MG/DL (ref 0.6–1.3)
GFR SERPL CREATININE-BSD FRML MDRD: 96 ML/MIN/1.73
GLOBULIN UR ELPH-MCNC: 2.6 GM/DL
GLUCOSE BLD-MCNC: 96 MG/DL (ref 70–100)
POTASSIUM BLD-SCNC: 4.2 MMOL/L (ref 3.5–5.5)
PROT SERPL-MCNC: 7.1 G/DL (ref 5.7–8.2)
SODIUM BLD-SCNC: 137 MMOL/L (ref 132–146)

## 2018-11-16 PROCEDURE — 80053 COMPREHEN METABOLIC PANEL: CPT | Performed by: INTERNAL MEDICINE

## 2018-11-16 PROCEDURE — 36415 COLL VENOUS BLD VENIPUNCTURE: CPT | Performed by: INTERNAL MEDICINE

## 2018-11-16 PROCEDURE — 99214 OFFICE O/P EST MOD 30 MIN: CPT | Performed by: INTERNAL MEDICINE

## 2018-11-16 NOTE — PROGRESS NOTES
"        Encounter Date:11/16/2018      Patient ID: Judah Cerna is a 63 y.o. male.    Chief Complaint: Coronary Artery Disease      PROBLEM LIST:  1. Coronary artery disease:  a. History of \"small heart attack,\" in 1999.  b. Subsequent cardiac catheterization study followed by 2 coronary stents; incomplete database.  c. Had several stress tests with his primary care physician during 5899-9183, which were reportedly unremarkable.  d.  Recurrent atypical chest pain, fall 2014.  e. Cardiolite stress test, 09/23/2014, was normal with excellent/above average exercise capacity and ejection fraction 55%.  f. Echocardiogram, 09/23/2014, showed ejection fraction 65% with mild mitral and mild  tricuspid regurgitation.   g. Now presents with recurrent chest pain (October 2017)  h. MPS, 10/31/2017: Excellent exercise capacity. Expected exercise time 8:50. Actual exercise 10:15. THR achieved at 5:33. DOROTEO -15. 98% of MPHR. Negative treadmill stress test for anginal chest pain. Abnormal stress ECG showing ischemic ST depressions in inferior and anterolateral leads which persisted beyond 6 minutes of recovery. Small-to-medium-sized infarct located in the apex with no significant ischemia noted. EF 65%.  i. [facility], 09/29/2018: Mildly abnormal left ventriculogram with an estimated EF of 60% and mild apical hypokinesia. Severe 3 vessel disease that is associated with an acute coronary syndrome in a younger diabetic.  j. CABGx5 (SVx4, VERITO x 1).[facility], 10/02/2018  2. Hypertension.   3. Dyslipidemia.   4. Type 2 diabetes.   5. Remote appendectomy with partial bowel resection due to ruptured appendix.     History of Present Illness  Patient presents today for 1 month hospital follow-up s/p CABG x5, with a history of coronary artery disease and cardiac risk factors. Since last visit, he has been doing well overall from a cardiovascular standpoint. He is still experiencing soreness in his chest from his surgery. Denies angina-type chest pain, " "shortness of breath, leg swelling, palpitations, and syncope. Patient confirms he has never smoked before.    No Known Allergies      Current Outpatient Medications:   •  ALPRAZolam (XANAX) 0.5 MG tablet, Take 0.5 mg by mouth Daily As Needed., Disp: , Rfl:   •  aspirin 325 MG EC tablet, Take 1 tablet by mouth Daily., Disp: 30 tablet, Rfl: 1  •  Cholecalciferol (VITAMIN D3) 19827 units capsule, Take 1 capsule by mouth 1 (One) Time Per Week., Disp: 4 capsule, Rfl: 0  •  glimepiride (AMARYL) 4 MG tablet, Take 4 mg by mouth Every Morning Before Breakfast., Disp: , Rfl:   •  HYDROcodone-acetaminophen (NORCO) 5-325 MG per tablet, Take 1 tablet by mouth 2 (Two) Times a Day As Needed (Knee Pain)., Disp: , Rfl:   •  metFORMIN (GLUCOPHAGE) 1000 MG tablet, Take 1,000 mg by mouth Daily With Breakfast., Disp: , Rfl:   •  nebivolol (BYSTOLIC) 5 MG tablet, Take 5 mg by mouth Daily., Disp: , Rfl:   •  nystatin (MYCOSTATIN) 167546 UNIT/ML suspension, Swish and swallow 500,000 Units As Needed., Disp: , Rfl:   •  vitamin B-12 (CYANOCOBALAMIN) 500 MCG tablet, Take 1 tablet by mouth Daily., Disp: 30 tablet, Rfl: 0    The following portions of the patient's history were reviewed and updated as appropriate: allergies, current medications, past family history, past medical history, past social history, past surgical history and problem list.    ROS  Review of Systems   Constitution: Negative for chills, fever, weight gain and weight loss.   Cardiovascular: Negative for chest pain, claudication, dyspnea on exertion, leg swelling, orthopnea, palpitations, paroxysmal nocturnal dyspnea and syncope.        No dizziness   Gastrointestinal: Negative for abdominal pain, constipation, diarrhea, nausea and vomiting.   Genitourinary:        No urinary symptoms   Neurological:        No symptoms of stroke.   All other systems reviewed and are negative.    Objective:     Blood pressure 130/86, pulse 69, height 165.1 cm (65\"), weight 69.2 kg (152 lb 9.6 " oz), SpO2 96 %.        Physical Exam  Constitutional: She appears well-developed and well-nourished.   HENT:   HEENT exam unremarkable.   Neck: Neck supple. No JVD present.   No carotid bruits.   Cardiovascular: Normal rate, regular rhythm and normal heart sounds.    No murmur heard.  2 plus symmetric pulses.   Pulmonary/Chest: Breath sounds normal. Does not exhibit tenderness.   Abdominal:   Abdomen benign.   Musculoskeletal: Does not exhibit edema.   Neurological:   Neurological exam unremarkable.   Vitals reviewed.    Lab Review:   Lab Results   Component Value Date    GLUCOSE 123 (H) 10/09/2018    BUN 10 10/09/2018    CREATININE 0.83 10/09/2018    EGFRIFAFRI 114 10/09/2018    BCR 12.0 10/09/2018    K 4.0 10/09/2018    CO2 26.0 10/09/2018    CALCIUM 9.5 10/09/2018    ALBUMIN 4.00 10/09/2018    AST 41 (H) 10/09/2018     (H) 10/09/2018     Lab Results   Component Value Date    CHOL 91 09/29/2018    TRIG 98 09/29/2018    HDL 26 (L) 09/29/2018    LDL 44 09/29/2018     Lab Results   Component Value Date    WBC 11.95 (H) 10/06/2018    HGB 11.1 (L) 10/07/2018    HCT 33.8 (L) 10/07/2018    .0 (H) 10/06/2018     10/06/2018     Lab Results   Component Value Date    TSH 1.813 09/29/2018       Procedures       Assessment:      Diagnosis Plan   1. Coronary artery disease involving coronary bypass graft of native heart without angina pectoris  Stable post recent CABG.     2. Essential hypertension  Controlled.     3. Dyslipidemia , off statin therapy due to abnormal LFTs.   Comprehensive Metabolic Panel to assess current LFTs and consider restarting statin therapy.       Plan:   CMP. Statin therapy pending CMP results.  Stable cardiac status.  Continue current medications.   FU in 6 MO, sooner as needed.  Thank you for allowing us to participate in the care of your patient.     Scribed for Kay Stevens MD by Emily Zhang. 11/16/2018  3:48 PM      Kay SOTO MD, personally performed the services  described in this documentation as scribed by the above named individual in my presence, and it is both accurate and complete.  11/16/2018  3:48 PM        Please note that portions of this note may have been completed with a voice recognition program. Efforts were made to edit the dictations, but occasionally words are mistranscribed.

## 2018-11-29 DIAGNOSIS — E78.5 HYPERLIPIDEMIA, UNSPECIFIED HYPERLIPIDEMIA TYPE: Primary | ICD-10-CM

## 2018-11-29 RX ORDER — ROSUVASTATIN CALCIUM 20 MG/1
20 TABLET, COATED ORAL DAILY
Qty: 90 TABLET | Refills: 3 | Status: SHIPPED | OUTPATIENT
Start: 2018-11-29

## 2019-01-07 ENCOUNTER — LAB (OUTPATIENT)
Dept: LAB | Facility: HOSPITAL | Age: 64
End: 2019-01-07

## 2019-01-07 DIAGNOSIS — E78.5 HYPERLIPIDEMIA, UNSPECIFIED HYPERLIPIDEMIA TYPE: ICD-10-CM

## 2019-01-07 LAB
ALBUMIN SERPL-MCNC: 4.57 G/DL (ref 3.2–4.8)
ALP SERPL-CCNC: 81 U/L (ref 25–100)
ALT SERPL W P-5'-P-CCNC: 37 U/L (ref 7–40)
ARTICHOKE IGE QN: 65 MG/DL (ref 0–130)
AST SERPL-CCNC: 30 U/L (ref 0–33)
BILIRUB CONJ SERPL-MCNC: 0.2 MG/DL (ref 0–0.2)
BILIRUB INDIRECT SERPL-MCNC: 0.4 MG/DL (ref 0.1–1.1)
BILIRUB SERPL-MCNC: 0.6 MG/DL (ref 0.3–1.2)
CHOLEST SERPL-MCNC: 126 MG/DL (ref 0–200)
HDLC SERPL-MCNC: 35 MG/DL (ref 40–60)
PROT SERPL-MCNC: 6.9 G/DL (ref 5.7–8.2)
TRIGL SERPL-MCNC: 239 MG/DL (ref 0–150)

## 2019-01-07 PROCEDURE — 80076 HEPATIC FUNCTION PANEL: CPT

## 2019-01-07 PROCEDURE — 36415 COLL VENOUS BLD VENIPUNCTURE: CPT

## 2019-01-07 PROCEDURE — 80061 LIPID PANEL: CPT

## 2019-01-24 ENCOUNTER — OFFICE VISIT (OUTPATIENT)
Dept: CARDIAC SURGERY | Facility: CLINIC | Age: 64
End: 2019-01-24

## 2019-01-24 VITALS
OXYGEN SATURATION: 100 % | DIASTOLIC BLOOD PRESSURE: 82 MMHG | BODY MASS INDEX: 27.19 KG/M2 | HEIGHT: 65 IN | TEMPERATURE: 98 F | SYSTOLIC BLOOD PRESSURE: 134 MMHG | WEIGHT: 163.2 LBS | HEART RATE: 71 BPM

## 2019-01-24 DIAGNOSIS — E11.8 TYPE 2 DIABETES MELLITUS WITH COMPLICATION, WITHOUT LONG-TERM CURRENT USE OF INSULIN (HCC): ICD-10-CM

## 2019-01-24 DIAGNOSIS — I25.810 CORONARY ARTERY DISEASE INVOLVING CORONARY BYPASS GRAFT OF NATIVE HEART WITHOUT ANGINA PECTORIS: ICD-10-CM

## 2019-01-24 DIAGNOSIS — Z95.1 S/P CABG (CORONARY ARTERY BYPASS GRAFT): Primary | ICD-10-CM

## 2019-01-24 PROCEDURE — 99212 OFFICE O/P EST SF 10 MIN: CPT | Performed by: THORACIC SURGERY (CARDIOTHORACIC VASCULAR SURGERY)

## 2019-01-24 NOTE — PROGRESS NOTES
01/24/2019  Patient Information  Judah Cerna                                                                                          [address] [facility] [address]   1955  'PCP/Referring Physician'  Justin Casas, APRN  [phone]  No ref. provider found    Chief Complaint   Patient presents with   • Coronary Artery Disease     3 month follow-up. S/P CABG x5 10/2/18.-He still expriencing brief sharp pain that radiates in chest.   CC: Feel great.  I've had no chest pain and no shortness of breath.  Walking several miles daily    History of Present Illness: 63-year-old -American male now 3-1/2 months postoperative area artery bypass grafting ×5.  The patient has had a benign postoperative course area he denies shortness of breath and anginal quality chest pain.  He has not had fever, chills, or night sweats.  His wounds are healing without erythema, drainage, or tenderness.      Patient Active Problem List   Diagnosis   • CAD (coronary artery disease)   • Hypertension   • Dyslipidemia   • Type 2 diabetes mellitus (CMS/HCC)   • Abnormal EKG     Past Medical History:   Diagnosis Date   • CAD (coronary artery disease)    • Dyslipidemia    • Hypertension    • Type 2 diabetes mellitus (CMS/HCC)      Past Surgical History:   Procedure Laterality Date   • APPENDECTOMY      w/ partial bowel resection due to ruptured appendix   • CARDIAC CATHETERIZATION     • CARDIAC CATHETERIZATION N/A 9/29/2018    Procedure: Left Heart Cath;  Surgeon: Nadir Tanner MD;  Location:  [facility];  Service: Cardiology   • CORONARY ARTERY BYPASS GRAFT N/A 10/2/2018    Procedure: MEDIAN STERNOTOMY,CORONARY ARTERY BYPASS WITH INTERNAL MAMMARY ARTERY GRAFT  X 5, EVH OF THE RIGHT GREATER SAPHENOUS VEIN;  Surgeon: Shane Bosch MD;  Location: [facility];  Service: Cardiothoracic   • CORONARY STENT PLACEMENT      x 2       Current Outpatient Medications:   •  ALPRAZolam (XANAX) 0.5 MG tablet, Take 0.5  mg by mouth Daily As Needed., Disp: , Rfl:   •  aspirin 325 MG EC tablet, Take 1 tablet by mouth Daily., Disp: 30 tablet, Rfl: 1  •  Cholecalciferol (VITAMIN D3) 69759 units capsule, Take 1 capsule by mouth 1 (One) Time Per Week., Disp: 4 capsule, Rfl: 0  •  glimepiride (AMARYL) 4 MG tablet, Take 4 mg by mouth Every Morning Before Breakfast., Disp: , Rfl:   •  HYDROcodone-acetaminophen (NORCO) 5-325 MG per tablet, Take 1 tablet by mouth 2 (Two) Times a Day As Needed (Knee Pain)., Disp: , Rfl:   •  metFORMIN (GLUCOPHAGE) 1000 MG tablet, Take 1,000 mg by mouth Daily With Breakfast., Disp: , Rfl:   •  nebivolol (BYSTOLIC) 5 MG tablet, Take 5 mg by mouth Daily., Disp: , Rfl:   •  nystatin (MYCOSTATIN) 931743 UNIT/ML suspension, Swish and swallow 500,000 Units As Needed., Disp: , Rfl:   •  rosuvastatin (CRESTOR) 20 MG tablet, Take 1 tablet by mouth Daily., Disp: 90 tablet, Rfl: 3  •  vitamin B-12 (CYANOCOBALAMIN) 500 MCG tablet, Take 1 tablet by mouth Daily., Disp: 30 tablet, Rfl: 0  No Known Allergies  Social History     Socioeconomic History   • Marital status:      Spouse name: Not on file   • Number of children: 2   • Years of education: Not on file   • Highest education level: Not on file   Social Needs   • Financial resource strain: Not on file   • Food insecurity - worry: Not on file   • Food insecurity - inability: Not on file   • Transportation needs - medical: Not on file   • Transportation needs - non-medical: Not on file   Occupational History   • Occupation:    Tobacco Use   • Smoking status: Never Smoker   • Smokeless tobacco: Never Used   Substance and Sexual Activity   • Alcohol use: No   • Drug use: No   • Sexual activity: Defer   Other Topics Concern   • Not on file   Social History Narrative    Lives in [place], [place] with spouse and grandson.     Family History   Problem Relation Age of Onset   • No Known Problems Mother    • No Known Problems Father      Review of Systems  "  Constitution: Negative for chills, fever, malaise/fatigue, night sweats and weight loss.   HENT: Negative for hearing loss, odynophagia and sore throat.    Cardiovascular: Negative for chest pain, dyspnea on exertion, leg swelling, orthopnea and palpitations.   Respiratory: Negative for cough and hemoptysis.    Endocrine: Negative for cold intolerance, heat intolerance, polydipsia, polyphagia and polyuria.   Hematologic/Lymphatic: Does not bruise/bleed easily.   Skin: Negative for itching and rash.   Musculoskeletal: Negative for joint pain, joint swelling and myalgias.   Gastrointestinal: Negative for abdominal pain, constipation, diarrhea, hematemesis, hematochezia, melena, nausea and vomiting.   Genitourinary: Negative for dysuria, frequency and hematuria.   Neurological: Negative for focal weakness, headaches, numbness and seizures.   Psychiatric/Behavioral: Negative for depression and suicidal ideas. The patient is not nervous/anxious.    All other systems reviewed and are negative.    Vitals:    01/24/19 1024   BP: 134/82   BP Location: Right arm   Patient Position: Sitting   Pulse: 71   Temp: 98 °F (36.7 °C)   TempSrc: Temporal   SpO2: 100%   Weight: 74 kg (163 lb 3.2 oz)   Height: 165.1 cm (65\")      Physical Exam   Constitutional: He is oriented to person, place, and time. He appears well-developed and well-nourished. No distress.   HENT:   Head: Normocephalic.   Right Ear: External ear normal.   Left Ear: External ear normal.   Nose: Nose normal.   Eyes: Pupils are equal, round, and reactive to light.   Neck: Normal range of motion. Neck supple. No tracheal deviation present. No thyromegaly present.   Cardiovascular: Normal rate, regular rhythm, normal heart sounds and intact distal pulses.   No murmur heard.  Pulmonary/Chest: Effort normal and breath sounds normal. No respiratory distress. He has no wheezes. He has no rales. He exhibits no tenderness.   Abdominal: Soft. Bowel sounds are normal. He " exhibits no distension and no mass. There is no tenderness.   Musculoskeletal: Normal range of motion. He exhibits no edema.   Sternum stable.  No erythema no drainage no tenderness right lower extremity saphenous vein harvest site healing without complication.  No edema of the lower extremities.   Lymphadenopathy:     He has no cervical adenopathy.   Neurological: He is alert and oriented to person, place, and time. He has normal reflexes. No cranial nerve deficit.   Skin: Skin is warm and dry. No rash noted. No erythema.   Psychiatric: He has a normal mood and affect.       Labs/Imaging: PA and lateral chest x-ray reviewed from November 2018.  This film was consistent with the current postoperative tree.    Assessment/Plan: Stable post coronary artery bypass grafting course./Turned to this clinic when necessary continue close follow-up with cardiology as appointed.    Patient Active Problem List   Diagnosis   • CAD (coronary artery disease)   • Hypertension   • Dyslipidemia   • Type 2 diabetes mellitus (CMS/HCC)   • Abnormal EKG       Shane Bosch MD  [facility]  01/25/19   12:28 PM

## 2019-06-28 ENCOUNTER — OFFICE VISIT (OUTPATIENT)
Dept: CARDIOLOGY | Facility: CLINIC | Age: 64
End: 2019-06-28

## 2019-06-28 VITALS
OXYGEN SATURATION: 97 % | WEIGHT: 167 LBS | HEART RATE: 87 BPM | BODY MASS INDEX: 27.82 KG/M2 | DIASTOLIC BLOOD PRESSURE: 60 MMHG | SYSTOLIC BLOOD PRESSURE: 128 MMHG | HEIGHT: 65 IN

## 2019-06-28 DIAGNOSIS — I10 ESSENTIAL HYPERTENSION: ICD-10-CM

## 2019-06-28 DIAGNOSIS — I25.810 CORONARY ARTERY DISEASE INVOLVING CORONARY BYPASS GRAFT OF NATIVE HEART WITHOUT ANGINA PECTORIS: Primary | ICD-10-CM

## 2019-06-28 DIAGNOSIS — E78.5 DYSLIPIDEMIA: ICD-10-CM

## 2019-06-28 PROCEDURE — 99214 OFFICE O/P EST MOD 30 MIN: CPT | Performed by: INTERNAL MEDICINE

## 2019-06-28 NOTE — PROGRESS NOTES
"        Encounter Date:06/28/2019        Patient ID: Judah Cerna is a 63 y.o. male.    PCP: Justin Casas APRN     Chief Complaint: Coronary Artery Disease      PROBLEM LIST:  1. Coronary artery disease:  a. History of \"small heart attack,\" in 1999.  b. Subsequent cardiac catheterization study followed by 2 coronary stents; incomplete database.  c. Had several stress tests with his PCP during 5228-4198, which were reportedly unremarkable.  d. Recurrent atypical chest pain, fall 2014.  e. Cardiolite stress test, 09/23/2014, was normal with excellent/above average exercise capacity and EF 55%.  f. Echocardiogram, 09/23/2014: EF 65% with mild mitral and mild  tricuspid regurgitation.   g. Now presents with recurrent chest pain (October 2017)  h. MPS, 10/31/2017: Excellent exercise capacity with expercted time 8:50, actual 10:15. THR achieved at 5:33. DOROTEO -15. 98% of MPHR. Negative treadmill stress test for anginal chest pain. Abnormal stress ECG showing ischemic ST depressions in inferior and anterolateral leads which persisted beyond 6 minutes of recovery. Small-to-medium-sized infarct located in the apex with no significant ischemia noted. EF 65%.  i. [facility], 09/29/2018: Mildly abnormal left ventriculogram with an estimated EF of 60% and mild apical hypokinesia. Severe 3 vessel disease that is associated with an acute coronary syndrome in a younger diabetic.  j. CABG x5, 10/02/2018: SVG-PDA, SVG to OM1 and posterior lateral branch of circ, LIMA-LAD.  2. Hypertension.   3. Dyslipidemia.   4. Type 2 diabetes.   5. Remote appendectomy with partial bowel resection due to ruptured appendix.     History of Present Illness  Patient presents today for 6 month follow-up with a history of coronary artery disease and cardiac risk factors. Since last visit, he has been doing well overall from a cardiovascular standpoint. He does report some pain and tingling in his chest, worse on palpation. This is due to continued " healing s/p CABG in October. Denies chest pain, shortness of breath, leg swelling, palpitations, and syncope. Remains busy and active with working and regular walking.  No limitations.    No Known Allergies      Current Outpatient Medications:   •  ALPRAZolam (XANAX) 0.5 MG tablet, Take 0.5 mg by mouth Daily As Needed., Disp: , Rfl:   •  aspirin 325 MG EC tablet, Take 1 tablet by mouth Daily., Disp: 30 tablet, Rfl: 1  •  glimepiride (AMARYL) 4 MG tablet, Take 4 mg by mouth Every Morning Before Breakfast., Disp: , Rfl:   •  HYDROcodone-acetaminophen (NORCO) 5-325 MG per tablet, Take 1 tablet by mouth 2 (Two) Times a Day As Needed (Knee Pain)., Disp: , Rfl:   •  metFORMIN (GLUCOPHAGE) 1000 MG tablet, Take 1,000 mg by mouth Daily With Breakfast., Disp: , Rfl:   •  nebivolol (BYSTOLIC) 5 MG tablet, Take 5 mg by mouth Daily., Disp: , Rfl:   •  rosuvastatin (CRESTOR) 20 MG tablet, Take 1 tablet by mouth Daily., Disp: 90 tablet, Rfl: 3  •  vitamin B-12 (CYANOCOBALAMIN) 500 MCG tablet, Take 1 tablet by mouth Daily., Disp: 30 tablet, Rfl: 0    The following portions of the patient's history were reviewed and updated as appropriate: allergies, current medications, past family history, past medical history, past social history, past surgical history and problem list.    ROS  Review of Systems   Constitution: Negative for chills, fatigue, fever, generalized weakness, weight gain and weight loss.   Cardiovascular: Negative for chest pain, claudication, dyspnea on exertion, leg swelling, orthopnea, palpitations, paroxysmal nocturnal dyspnea and syncope.   Respiratory: Negative for cough, shortness of breath, and wheezing.  HENT: Negative for ear pain, nosebleeds, and tinnitus.  Gastrointestinal: Negative for abdominal pain, constipation, diarrhea, nausea and vomiting.   Genitourinary: No urinary symptoms   Musculoskeletal: Negative for muscle cramps.  Neurological: Negative for dizziness, headaches, loss of balance, numbness, and  "symptoms of stroke.  Psychiatric: Normal mental status.     All other systems reviewed and are negative.    Objective:     /60 (BP Location: Left arm, Patient Position: Sitting)   Pulse 87   Ht 165.1 cm (65\")   Wt 75.8 kg (167 lb)   SpO2 97%   BMI 27.79 kg/m²          Physical Exam  Constitutional: Patient appears well-developed and well-nourished.   HENT: HEENT exam unremarkable.   Neck: Neck supple. No JVD present. No carotid bruits.   Cardiovascular: Normal rate, regular rhythm and normal heart sounds. No murmur heard.   2+ symmetric pulses.   Pulmonary/Chest: Breath sounds normal. Does not exhibit tenderness. He has a keloid on the sternotomy scar.  Abdominal: Abdomen benign.   Musculoskeletal: Does not exhibit edema.   Neurological: Neurological exam unremarkable.   Vitals reviewed.    Lab Review:   Lab Results   Component Value Date    GLUCOSE 96 11/16/2018    BUN 13 11/16/2018    CREATININE 0.96 11/16/2018    EGFRIFAFRI 96 11/16/2018    BCR 13.5 11/16/2018    K 4.2 11/16/2018    CO2 27.0 11/16/2018    CALCIUM 9.9 11/16/2018    ALBUMIN 4.57 01/07/2019    AST 30 01/07/2019    ALT 37 01/07/2019     Lab Results   Component Value Date    CHOL 126 01/07/2019    TRIG 239 (H) 01/07/2019    HDL 35 (L) 01/07/2019    LDL 65 01/07/2019      Lab Results   Component Value Date    WBC 11.95 (H) 10/06/2018    HGB 11.1 (L) 10/07/2018    HCT 33.8 (L) 10/07/2018    .0 (H) 10/06/2018     10/06/2018     Lab Results   Component Value Date    TSH 1.813 09/29/2018     Lab Results   Component Value Date    HGBA1C 6.30 (H) 09/29/2018        Procedures       Assessment:      Diagnosis Plan   1. Coronary artery disease, status post CABG, stable no current angina or CHF.  Stable, continue current medications.   2. Essential hypertension  Well-controlled continue current medications.   3. Dyslipidemia  Well-controlled, continue rosuvastatin 20 mg.     Plan:   Apply Vitamin E cream to scar for keloid formation and " associated itching and burning..  Stable cardiac status.  Continue current medications.   FU in 12 MO, sooner as needed.  Thank you for allowing us to participate in the care of your patient.     Scribed for Kay Stevens MD by Emily Zhang. 6/28/2019  12:35 PM      I, Kay Stevens MD, personally performed the services described in this documentation as scribed by the above named individual in my presence, and it is both accurate and complete.  6/28/2019  12:51 PM        Please note that portions of this note may have been completed with a voice recognition program. Efforts were made to edit the dictations, but occasionally words are mistranscribed.

## 2020-03-13 ENCOUNTER — APPOINTMENT (OUTPATIENT)
Dept: GENERAL RADIOLOGY | Facility: HOSPITAL | Age: 65
End: 2020-03-13

## 2020-03-13 ENCOUNTER — HOSPITAL ENCOUNTER (EMERGENCY)
Facility: HOSPITAL | Age: 65
Discharge: HOME | End: 2020-03-13
Attending: EMERGENCY MEDICINE | Admitting: EMERGENCY MEDICINE

## 2020-03-13 VITALS
DIASTOLIC BLOOD PRESSURE: 92 MMHG | TEMPERATURE: 97.8 F | SYSTOLIC BLOOD PRESSURE: 145 MMHG | HEART RATE: 51 BPM | HEIGHT: 65 IN | RESPIRATION RATE: 16 BRPM | OXYGEN SATURATION: 97 % | BODY MASS INDEX: 28.66 KG/M2 | WEIGHT: 172 LBS

## 2020-03-13 DIAGNOSIS — R07.9 CHEST PAIN, UNSPECIFIED TYPE: Primary | ICD-10-CM

## 2020-03-13 LAB
ALBUMIN SERPL-MCNC: 4.6 G/DL (ref 3.5–5.2)
ALBUMIN/GLOB SERPL: 1.9 G/DL
ALP SERPL-CCNC: 71 U/L (ref 39–117)
ALT SERPL W P-5'-P-CCNC: 24 U/L (ref 1–41)
ANION GAP SERPL CALCULATED.3IONS-SCNC: 12 MMOL/L (ref 5–15)
AST SERPL-CCNC: 25 U/L (ref 1–40)
BASOPHILS # BLD AUTO: 0.05 10*3/MM3 (ref 0–0.2)
BASOPHILS NFR BLD AUTO: 0.7 % (ref 0–1.5)
BILIRUB SERPL-MCNC: 0.7 MG/DL (ref 0.2–1.2)
BUN BLD-MCNC: 9 MG/DL (ref 8–23)
BUN/CREAT SERPL: 9 (ref 7–25)
CALCIUM SPEC-SCNC: 9.7 MG/DL (ref 8.6–10.5)
CHLORIDE SERPL-SCNC: 104 MMOL/L (ref 98–107)
CO2 SERPL-SCNC: 25 MMOL/L (ref 22–29)
CREAT BLD-MCNC: 1 MG/DL (ref 0.76–1.27)
DEPRECATED RDW RBC AUTO: 40.9 FL (ref 37–54)
EOSINOPHIL # BLD AUTO: 0.24 10*3/MM3 (ref 0–0.4)
EOSINOPHIL NFR BLD AUTO: 3.3 % (ref 0.3–6.2)
ERYTHROCYTE [DISTWIDTH] IN BLOOD BY AUTOMATED COUNT: 13.1 % (ref 12.3–15.4)
GFR SERPL CREATININE-BSD FRML MDRD: 91 ML/MIN/1.73
GLOBULIN UR ELPH-MCNC: 2.4 GM/DL
GLUCOSE BLD-MCNC: 142 MG/DL (ref 65–99)
HCT VFR BLD AUTO: 49.4 % (ref 37.5–51)
HGB BLD-MCNC: 15.9 G/DL (ref 13–17.7)
HOLD SPECIMEN: NORMAL
HOLD SPECIMEN: NORMAL
IMM GRANULOCYTES # BLD AUTO: 0.02 10*3/MM3 (ref 0–0.05)
IMM GRANULOCYTES NFR BLD AUTO: 0.3 % (ref 0–0.5)
LIPASE SERPL-CCNC: 56 U/L (ref 13–60)
LYMPHOCYTES # BLD AUTO: 2.13 10*3/MM3 (ref 0.7–3.1)
LYMPHOCYTES NFR BLD AUTO: 29.4 % (ref 19.6–45.3)
MCH RBC QN AUTO: 27.9 PG (ref 26.6–33)
MCHC RBC AUTO-ENTMCNC: 32.2 G/DL (ref 31.5–35.7)
MCV RBC AUTO: 86.7 FL (ref 79–97)
MONOCYTES # BLD AUTO: 0.61 10*3/MM3 (ref 0.1–0.9)
MONOCYTES NFR BLD AUTO: 8.4 % (ref 5–12)
NEUTROPHILS # BLD AUTO: 4.2 10*3/MM3 (ref 1.7–7)
NEUTROPHILS NFR BLD AUTO: 57.9 % (ref 42.7–76)
NRBC BLD AUTO-RTO: 0 /100 WBC (ref 0–0.2)
NT-PROBNP SERPL-MCNC: 23.6 PG/ML (ref 5–900)
PLATELET # BLD AUTO: 160 10*3/MM3 (ref 140–450)
PMV BLD AUTO: 11.5 FL (ref 6–12)
POTASSIUM BLD-SCNC: 4.2 MMOL/L (ref 3.5–5.2)
PROT SERPL-MCNC: 7 G/DL (ref 6–8.5)
RBC # BLD AUTO: 5.7 10*6/MM3 (ref 4.14–5.8)
SODIUM BLD-SCNC: 141 MMOL/L (ref 136–145)
TROPONIN T SERPL-MCNC: <0.01 NG/ML (ref 0–0.03)
WBC NRBC COR # BLD: 7.25 10*3/MM3 (ref 3.4–10.8)
WHOLE BLOOD HOLD SPECIMEN: NORMAL
WHOLE BLOOD HOLD SPECIMEN: NORMAL

## 2020-03-13 PROCEDURE — 71045 X-RAY EXAM CHEST 1 VIEW: CPT

## 2020-03-13 PROCEDURE — 83690 ASSAY OF LIPASE: CPT | Performed by: EMERGENCY MEDICINE

## 2020-03-13 PROCEDURE — 99285 EMERGENCY DEPT VISIT HI MDM: CPT

## 2020-03-13 PROCEDURE — 84484 ASSAY OF TROPONIN QUANT: CPT | Performed by: EMERGENCY MEDICINE

## 2020-03-13 PROCEDURE — 80053 COMPREHEN METABOLIC PANEL: CPT | Performed by: EMERGENCY MEDICINE

## 2020-03-13 PROCEDURE — 83880 ASSAY OF NATRIURETIC PEPTIDE: CPT | Performed by: EMERGENCY MEDICINE

## 2020-03-13 PROCEDURE — 85025 COMPLETE CBC W/AUTO DIFF WBC: CPT | Performed by: EMERGENCY MEDICINE

## 2020-03-13 PROCEDURE — 93005 ELECTROCARDIOGRAM TRACING: CPT | Performed by: EMERGENCY MEDICINE

## 2020-03-13 RX ORDER — SODIUM CHLORIDE 0.9 % (FLUSH) 0.9 %
10 SYRINGE (ML) INJECTION AS NEEDED
Status: DISCONTINUED | OUTPATIENT
Start: 2020-03-13 | End: 2020-03-13 | Stop reason: HOSPADM

## 2020-03-13 RX ORDER — ASPIRIN 81 MG/1
324 TABLET, CHEWABLE ORAL ONCE
Status: COMPLETED | OUTPATIENT
Start: 2020-03-13 | End: 2020-03-13

## 2020-03-13 RX ADMIN — ASPIRIN 81 MG 324 MG: 81 TABLET ORAL at 09:02

## 2020-03-13 NOTE — ED PROVIDER NOTES
"Subjective   Judah Cerna is a 64 y.o.male who presents to the emergency department with complaints of chest pain. The patient reports of a burning at his right chest that was intermittent, but is constant now. He notes that the pain has been going on for \"a couple of weeks\" and that he goes to the gym three to four times a week (Runs on treadmill for 20 minutes. Feels burning then.) Mr. Cerna states no accompanying symptoms. He denies that his chest pain is worse with deep breathing, but is worse with running. Mr. Cerna confirms a history of hypertension, hyperlipidemia, and diabetes mellitus. He denies a history of thyroid problems or reflux. The patient notes that he had a MI 20 years ago, and had a CABG 18 months ago here at [facility]. His past medical history includes coronary artery disease, hypertension, dyslipidemia, and type 2 diabetes mellitus. His past surgical history, in addition to the CABG, is cardiac catheterization, and coronary stent placement. He does not smoke and does not drink alcohol. The patient does not have any additional acute complaints at this time.      History provided by:  Patient  Chest Pain   Pain location:  R chest  Pain quality: burning    Pain radiates to:  Does not radiate  Pain severity now: 3/10.  Onset quality:  Sudden  Duration:  2 weeks  Timing: Was intermittent, now constant.  Chronicity:  New  Worsened by:  Movement  Associated symptoms: no cough, no fever, no lower extremity edema, no nausea, no palpitations, no shortness of breath and no vomiting        Review of Systems   Constitutional: Negative for chills and fever.   Respiratory: Negative for cough and shortness of breath.    Cardiovascular: Positive for chest pain. Negative for palpitations and leg swelling.   Gastrointestinal: Negative for diarrhea, nausea and vomiting.   Genitourinary:        Negative for hematuria   All other systems reviewed and are negative.      Past Medical History:   Diagnosis Date "   • CAD (coronary artery disease)    • Dyslipidemia    • Hypertension    • Type 2 diabetes mellitus (CMS/HCC)        No Known Allergies    Past Surgical History:   Procedure Laterality Date   • APPENDECTOMY      w/ partial bowel resection due to ruptured appendix   • CARDIAC CATHETERIZATION     • CARDIAC CATHETERIZATION N/A 9/29/2018    Procedure: Left Heart Cath;  Surgeon: Nadir Tanner MD;  Location:  [facility];  Service: Cardiology   • CORONARY ARTERY BYPASS GRAFT N/A 10/2/2018    Procedure: MEDIAN STERNOTOMY,CORONARY ARTERY BYPASS WITH INTERNAL MAMMARY ARTERY GRAFT  X 5, EVH OF THE RIGHT GREATER SAPHENOUS VEIN;  Surgeon: Shane Bosch MD;  Location:  [facility];  Service: Cardiothoracic   • CORONARY STENT PLACEMENT      x 2       Family History   Problem Relation Age of Onset   • No Known Problems Mother    • No Known Problems Father        Social History     Socioeconomic History   • Marital status:      Spouse name: Not on file   • Number of children: 2   • Years of education: Not on file   • Highest education level: Not on file   Occupational History   • Occupation:    Tobacco Use   • Smoking status: Never Smoker   • Smokeless tobacco: Never Used   Substance and Sexual Activity   • Alcohol use: No   • Drug use: No   • Sexual activity: Defer   Social History Narrative    Lives in [place], [place] with spouse and grandson.         Objective   Physical Exam   Constitutional: He is oriented to person, place, and time. He appears well-developed and well-nourished. No distress.   HENT:   Head: Normocephalic and atraumatic.   Eyes: Conjunctivae are normal. No scleral icterus.   Neck: Normal range of motion. Neck supple.   Cardiovascular: Regular rhythm and normal heart sounds. Bradycardia present. Exam reveals no gallop and no friction rub.   No murmur heard.  Heart is bradycardic, with regular rhythm.    Pulmonary/Chest: Effort normal and breath sounds normal. No respiratory  distress.   Lungs clear to ausciltation bilaterally.   Abdominal: Soft. There is no tenderness.   Musculoskeletal: Normal range of motion. He exhibits no edema.   Neurological: He is alert and oriented to person, place, and time.   Skin: Skin is warm and dry.   Psychiatric: He has a normal mood and affect. His behavior is normal.   Nursing note and vitals reviewed.      Procedures         ED Course     Recent Results (from the past 24 hour(s))   Troponin    Collection Time: 03/13/20  9:01 AM   Result Value Ref Range    Troponin T <0.010 0.000 - 0.030 ng/mL   Comprehensive Metabolic Panel    Collection Time: 03/13/20  9:01 AM   Result Value Ref Range    Glucose 142 (H) 65 - 99 mg/dL    BUN 9 8 - 23 mg/dL    Creatinine 1.00 0.76 - 1.27 mg/dL    Sodium 141 136 - 145 mmol/L    Potassium 4.2 3.5 - 5.2 mmol/L    Chloride 104 98 - 107 mmol/L    CO2 25.0 22.0 - 29.0 mmol/L    Calcium 9.7 8.6 - 10.5 mg/dL    Total Protein 7.0 6.0 - 8.5 g/dL    Albumin 4.60 3.50 - 5.20 g/dL    ALT (SGPT) 24 1 - 41 U/L    AST (SGOT) 25 1 - 40 U/L    Alkaline Phosphatase 71 39 - 117 U/L    Total Bilirubin 0.7 0.2 - 1.2 mg/dL    eGFR  African Amer 91 >60 mL/min/1.73    Globulin 2.4 gm/dL    A/G Ratio 1.9 g/dL    BUN/Creatinine Ratio 9.0 7.0 - 25.0    Anion Gap 12.0 5.0 - 15.0 mmol/L   Lipase    Collection Time: 03/13/20  9:01 AM   Result Value Ref Range    Lipase 56 13 - 60 U/L   BNP    Collection Time: 03/13/20  9:01 AM   Result Value Ref Range    proBNP 23.6 5.0 - 900.0 pg/mL   Light Blue Top    Collection Time: 03/13/20  9:01 AM   Result Value Ref Range    Extra Tube hold for add-on    Green Top (Gel)    Collection Time: 03/13/20  9:01 AM   Result Value Ref Range    Extra Tube Hold for add-ons.    Lavender Top    Collection Time: 03/13/20  9:01 AM   Result Value Ref Range    Extra Tube     Gold Top - SST    Collection Time: 03/13/20  9:01 AM   Result Value Ref Range    Extra Tube Hold for add-ons.    CBC Auto Differential    Collection Time:  03/13/20  9:01 AM   Result Value Ref Range    WBC 7.25 3.40 - 10.80 10*3/mm3    RBC 5.70 4.14 - 5.80 10*6/mm3    Hemoglobin 15.9 13.0 - 17.7 g/dL    Hematocrit 49.4 37.5 - 51.0 %    MCV 86.7 79.0 - 97.0 fL    MCH 27.9 26.6 - 33.0 pg    MCHC 32.2 31.5 - 35.7 g/dL    RDW 13.1 12.3 - 15.4 %    RDW-SD 40.9 37.0 - 54.0 fl    MPV 11.5 6.0 - 12.0 fL    Platelets 160 140 - 450 10*3/mm3    Neutrophil % 57.9 42.7 - 76.0 %    Lymphocyte % 29.4 19.6 - 45.3 %    Monocyte % 8.4 5.0 - 12.0 %    Eosinophil % 3.3 0.3 - 6.2 %    Basophil % 0.7 0.0 - 1.5 %    Immature Grans % 0.3 0.0 - 0.5 %    Neutrophils, Absolute 4.20 1.70 - 7.00 10*3/mm3    Lymphocytes, Absolute 2.13 0.70 - 3.10 10*3/mm3    Monocytes, Absolute 0.61 0.10 - 0.90 10*3/mm3    Eosinophils, Absolute 0.24 0.00 - 0.40 10*3/mm3    Basophils, Absolute 0.05 0.00 - 0.20 10*3/mm3    Immature Grans, Absolute 0.02 0.00 - 0.05 10*3/mm3    nRBC 0.0 0.0 - 0.2 /100 WBC     Note: In addition to lab results from this visit, the labs listed above may include labs taken at another facility or during a different encounter within the last 24 hours. Please correlate lab times with ED admission and discharge times for further clarification of the services performed during this visit.    XR Chest 1 View   Final Result   No acute cardiopulmonary disease.       D:  03/13/2020   E:  03/13/2020       This report was finalized on 3/13/2020 10:30 AM by Dr. Quiana Thapa MD.            Vitals:    03/13/20 1000 03/13/20 1020 03/13/20 1040 03/13/20 1049   BP: 133/82 133/85 128/83    BP Location:       Patient Position:       Pulse: 61 53 52 53   Resp:       Temp:       TempSrc:       SpO2: 96% 95% 97% 96%   Weight:       Height:         Medications   sodium chloride 0.9 % flush 10 mL (has no administration in time range)   aspirin chewable tablet 324 mg (324 mg Oral Given 3/13/20 0902)     ECG/EMG Results (last 24 hours)     ** No results found for the last 24 hours. **        ECG 12 Lead     (Results Pending)                                              MDM  Number of Diagnoses or Management Options  Chest pain, unspecified type: new and requires workup  Diagnosis management comments: The patient had an unchanged EKG and a troponin that is normal.  The patient's current pain has been persistent for the last 2 days therefore the initial troponin is considered very sensitive and specific to rule out acute myocardial infarction.    I am concerned the patient has pleurisy.  I have advised him to rest and avoid any strenuous activity until the symptoms have improved.    He will be referred for follow-up with his cardiologist, Dr. Stevens.    Advised to return immediately to the ER with any further concern.       Amount and/or Complexity of Data Reviewed  Clinical lab tests: ordered and reviewed  Tests in the radiology section of CPT®: ordered and reviewed  Obtain history from someone other than the patient: yes  Review and summarize past medical records: yes  Independent visualization of images, tracings, or specimens: yes        Final diagnoses:   Chest pain, unspecified type       Documentation assistance provided by richa Cain.  Information recorded by the scribe was done at my direction and has been verified and validated by me.     Jesús Cain  03/13/20 5155       Tay Hernandes MD  03/13/20 5562

## 2020-03-13 NOTE — DISCHARGE INSTRUCTIONS
The patient is advised to rest.     Follow-up with cardiology for further outpatient evaluation and avoid any excessive or strenuous activity until you have improvement of symptoms or cardiology clearance.

## 2020-07-10 ENCOUNTER — OFFICE VISIT (OUTPATIENT)
Dept: CARDIOLOGY | Facility: CLINIC | Age: 65
End: 2020-07-10

## 2020-07-10 VITALS
HEART RATE: 81 BPM | BODY MASS INDEX: 28.82 KG/M2 | TEMPERATURE: 95.5 F | SYSTOLIC BLOOD PRESSURE: 118 MMHG | OXYGEN SATURATION: 97 % | DIASTOLIC BLOOD PRESSURE: 84 MMHG | HEIGHT: 65 IN | WEIGHT: 173 LBS

## 2020-07-10 DIAGNOSIS — I10 ESSENTIAL HYPERTENSION: ICD-10-CM

## 2020-07-10 DIAGNOSIS — I25.810 CORONARY ARTERY DISEASE INVOLVING CORONARY BYPASS GRAFT OF NATIVE HEART WITHOUT ANGINA PECTORIS: Primary | ICD-10-CM

## 2020-07-10 DIAGNOSIS — E78.5 DYSLIPIDEMIA: ICD-10-CM

## 2020-07-10 PROCEDURE — 99214 OFFICE O/P EST MOD 30 MIN: CPT | Performed by: INTERNAL MEDICINE

## 2020-07-10 NOTE — PROGRESS NOTES
"[facility] Cardiology    Encounter Date: 07/10/2020    Patient ID: Judah Cerna is a 64 y.o. male.  : 1955     PCP: Justin Casas APRN       Chief Complaint: Coronary Artery Disease    PROBLEM LIST:  1. Coronary artery disease:  a. History of \"small heart attack,\" in .  b. Subsequent cardiac catheterization study followed by 2 coronary stents; incomplete database.  c. Had several stress tests with his PCP during [id number], which were reportedly unremarkable.  d. Recurrent atypical chest pain, fall .  e. Cardiolite stress test, 2014, was normal with excellent/above average exercise capacity and EF 55%.  f. Echocardiogram, 2014: EF 65% with mild mitral and mild  tricuspid regurgitation.   g. Now presents with recurrent chest pain (2017)  h. MPS, 10/31/2017: Excellent exercise capacity with expercted time 8:50, actual 10:15. THR achieved at 5:33. DOROTEO -15. 98% of MPHR. Negative treadmill stress test for anginal chest pain. Abnormal stress ECG showing ischemic ST depressions in inferior and anterolateral leads which persisted beyond 6 minutes of recovery. Small-to-medium-sized infarct located in the apex with no significant ischemia noted. EF 65%.  i. [facility], 2018: Mildly abnormal left ventriculogram with an estimated EF of 60% and mild apical hypokinesia. Severe 3 vessel disease that is associated with an acute coronary syndrome in a younger diabetic.  j. CABG x5, 10/02/2018: SVG-PDA, SVG to OM1 and posterior lateral branch of circ, LIMA-LAD.  2. Hypertension.   3. Dyslipidemia.   4. Type 2 diabetes.   5. Remote appendectomy with partial bowel resection due to ruptured appendix.     History of Present Illness  Patient presents today for a follow-up with a history of CAD and cardiac risk factors. Since last visit, he has been feeling well overall from a cardiovascular standpoint. He stays active with walking 4 miles, 3-4 days per week. Patient " denies any cardiopulmonary limitations with this exercise. Patient denies chest pain, shortness of breath, palpitations, edema, dizziness, and syncope.     No Known Allergies      Current Outpatient Medications:   •  ALPRAZolam (XANAX) 0.5 MG tablet, Take 0.5 mg by mouth Daily As Needed., Disp: , Rfl:   •  aspirin 325 MG EC tablet, Take 1 tablet by mouth Daily., Disp: 30 tablet, Rfl: 1  •  glimepiride (AMARYL) 4 MG tablet, Take 4 mg by mouth Every Morning Before Breakfast., Disp: , Rfl:   •  HYDROcodone-acetaminophen (NORCO) 5-325 MG per tablet, Take 1 tablet by mouth 2 (Two) Times a Day As Needed (Knee Pain)., Disp: , Rfl:   •  metFORMIN (GLUCOPHAGE) 1000 MG tablet, Take 1,000 mg by mouth Daily With Breakfast., Disp: , Rfl:   •  nebivolol (BYSTOLIC) 5 MG tablet, Take 5 mg by mouth Daily., Disp: , Rfl:   •  rosuvastatin (CRESTOR) 20 MG tablet, Take 1 tablet by mouth Daily., Disp: 90 tablet, Rfl: 3  •  vitamin B-12 (CYANOCOBALAMIN) 500 MCG tablet, Take 1 tablet by mouth Daily. (Patient taking differently: Take 1,000 mcg by mouth Daily.), Disp: 30 tablet, Rfl: 0    The following portions of the patient's history were reviewed and updated as appropriate: allergies, current medications, past family history, past medical history, past social history, past surgical history and problem list.    ROS  Review of Systems   Constitution: Negative for chills, fever, fatigue, generalized weakness.   Cardiovascular: Negative for chest pain, claudication, dyspnea on exertion, leg swelling, palpitations, orthopnea, and syncope.   Respiratory: Negative for cough, shortness of breath, and wheezing.  HENT: Negative for ear pain, nosebleeds, and tinnitus.  Gastrointestinal: Negative for abdominal pain, constipation, diarrhea, nausea and vomiting.   Genitourinary: No urinary symptoms.  Musculoskeletal: Negative for muscle cramps.  Neurological: Negative for dizziness, headaches, loss of balance, numbness, and symptoms of  "stroke.  Psychiatric: Normal mental status.     All other systems reviewed and are negative.        Objective:     /84 (BP Location: Right arm, Patient Position: Sitting)   Pulse 81   Temp 95.5 °F (35.3 °C)   Ht 165.1 cm (65\")   Wt 78.5 kg (173 lb)   SpO2 97%   BMI 28.79 kg/m²      Physical Exam  Constitutional: Patient appears well-developed and well-nourished.   HENT: HEENT exam unremarkable.   Neck: Neck supple. No JVD present. No carotid bruits.   Cardiovascular: Normal rate, regular rhythm and normal heart sounds. No murmur heard.   2+ symmetric pulses.   Pulmonary/Chest: Breath sounds normal. Does not exhibit tenderness.   Abdominal: Abdomen benign.   Musculoskeletal: Does not exhibit edema.   Neurological: Neurological exam unremarkable.   Vitals reviewed.    Lab Review:   Lab Results   Component Value Date    GLUCOSE 142 (H) 03/13/2020    BUN 9 03/13/2020    CREATININE 1.00 03/13/2020    EGFRIFAFRI 91 03/13/2020    BCR 9.0 03/13/2020    K 4.2 03/13/2020    CO2 25.0 03/13/2020    CALCIUM 9.7 03/13/2020    ALBUMIN 4.60 03/13/2020    AST 25 03/13/2020    ALT 24 03/13/2020     Lab Results   Component Value Date    CHOL 126 01/07/2019    TRIG 239 (H) 01/07/2019    HDL 35 (L) 01/07/2019    LDL 65 01/07/2019      Lab Results   Component Value Date    WBC 7.25 03/13/2020    RBC 5.70 03/13/2020    HGB 15.9 03/13/2020    HCT 49.4 03/13/2020    MCV 86.7 03/13/2020     03/13/2020       Procedures       Assessment:      Diagnosis Plan   1. Coronary artery disease involving coronary bypass graft of native heart without angina pectoris  Stable and asymptomatic. Continue aspirin.   2. Essential hypertension  Well-controlled, continue Bystolic.   3. Dyslipidemia  Well-controlled, continue rosuvastatin 20 mg.     Plan:   Stable cardiac status.  Continue current medications.   FU in 12 MO, sooner as needed.  Thank you for allowing us to participate in the care of your patient.     Scribed for Kay Stevens MD " by Emily Zhang. 7/10/2020  13:10        I, Kay Stevens MD, personally performed the services described in this documentation as scribed by the above named individual in my presence, and it is both accurate and complete.  7/10/2020  13:18        Please note that portions of this note may have been completed with a voice recognition program. Efforts were made to edit the dictations, but occasionally words are mistranscribed.

## 2021-01-12 ENCOUNTER — APPOINTMENT (OUTPATIENT)
Dept: GENERAL RADIOLOGY | Facility: HOSPITAL | Age: 66
End: 2021-01-12

## 2021-01-12 ENCOUNTER — HOSPITAL ENCOUNTER (EMERGENCY)
Facility: HOSPITAL | Age: 66
Discharge: HOME | End: 2021-01-12
Attending: EMERGENCY MEDICINE | Admitting: EMERGENCY MEDICINE

## 2021-01-12 VITALS
SYSTOLIC BLOOD PRESSURE: 132 MMHG | DIASTOLIC BLOOD PRESSURE: 76 MMHG | TEMPERATURE: 97.5 F | HEART RATE: 55 BPM | BODY MASS INDEX: 29.02 KG/M2 | WEIGHT: 170 LBS | OXYGEN SATURATION: 97 % | HEIGHT: 64 IN | RESPIRATION RATE: 18 BRPM

## 2021-01-12 DIAGNOSIS — R07.9 RIGHT-SIDED CHEST PAIN: Primary | ICD-10-CM

## 2021-01-12 LAB
ALBUMIN SERPL-MCNC: 4 G/DL (ref 3.5–5.2)
ALBUMIN/GLOB SERPL: 1.4 G/DL
ALP SERPL-CCNC: 69 U/L (ref 39–117)
ALT SERPL W P-5'-P-CCNC: 21 U/L (ref 1–41)
ANION GAP SERPL CALCULATED.3IONS-SCNC: 12 MMOL/L (ref 5–15)
AST SERPL-CCNC: 31 U/L (ref 1–40)
BASOPHILS # BLD AUTO: 0.04 10*3/MM3 (ref 0–0.2)
BASOPHILS NFR BLD AUTO: 0.6 % (ref 0–1.5)
BILIRUB SERPL-MCNC: 0.4 MG/DL (ref 0–1.2)
BUN SERPL-MCNC: 9 MG/DL (ref 8–23)
BUN/CREAT SERPL: 9.2 (ref 7–25)
CALCIUM SPEC-SCNC: 9.6 MG/DL (ref 8.6–10.5)
CHLORIDE SERPL-SCNC: 103 MMOL/L (ref 98–107)
CO2 SERPL-SCNC: 23 MMOL/L (ref 22–29)
CREAT SERPL-MCNC: 0.98 MG/DL (ref 0.76–1.27)
DEPRECATED RDW RBC AUTO: 40 FL (ref 37–54)
EOSINOPHIL # BLD AUTO: 0.28 10*3/MM3 (ref 0–0.4)
EOSINOPHIL NFR BLD AUTO: 4.3 % (ref 0.3–6.2)
ERYTHROCYTE [DISTWIDTH] IN BLOOD BY AUTOMATED COUNT: 12.9 % (ref 12.3–15.4)
GFR SERPL CREATININE-BSD FRML MDRD: 93 ML/MIN/1.73
GLOBULIN UR ELPH-MCNC: 2.8 GM/DL
GLUCOSE SERPL-MCNC: 129 MG/DL (ref 65–99)
HCT VFR BLD AUTO: 49.7 % (ref 37.5–51)
HGB BLD-MCNC: 16.2 G/DL (ref 13–17.7)
HOLD SPECIMEN: NORMAL
IMM GRANULOCYTES # BLD AUTO: 0.01 10*3/MM3 (ref 0–0.05)
IMM GRANULOCYTES NFR BLD AUTO: 0.2 % (ref 0–0.5)
LIPASE SERPL-CCNC: 59 U/L (ref 13–60)
LYMPHOCYTES # BLD AUTO: 2.1 10*3/MM3 (ref 0.7–3.1)
LYMPHOCYTES NFR BLD AUTO: 32.4 % (ref 19.6–45.3)
MCH RBC QN AUTO: 28.1 PG (ref 26.6–33)
MCHC RBC AUTO-ENTMCNC: 32.6 G/DL (ref 31.5–35.7)
MCV RBC AUTO: 86.3 FL (ref 79–97)
MONOCYTES # BLD AUTO: 0.53 10*3/MM3 (ref 0.1–0.9)
MONOCYTES NFR BLD AUTO: 8.2 % (ref 5–12)
NEUTROPHILS NFR BLD AUTO: 3.53 10*3/MM3 (ref 1.7–7)
NEUTROPHILS NFR BLD AUTO: 54.3 % (ref 42.7–76)
NRBC BLD AUTO-RTO: 0 /100 WBC (ref 0–0.2)
NT-PROBNP SERPL-MCNC: 27.4 PG/ML (ref 0–900)
PLATELET # BLD AUTO: 151 10*3/MM3 (ref 140–450)
PMV BLD AUTO: 11.4 FL (ref 6–12)
POTASSIUM SERPL-SCNC: 4 MMOL/L (ref 3.5–5.2)
PROT SERPL-MCNC: 6.8 G/DL (ref 6–8.5)
QT INTERVAL: 404 MS
QT INTERVAL: 440 MS
QTC INTERVAL: 413 MS
QTC INTERVAL: 431 MS
RBC # BLD AUTO: 5.76 10*6/MM3 (ref 4.14–5.8)
SODIUM SERPL-SCNC: 138 MMOL/L (ref 136–145)
TROPONIN T SERPL-MCNC: <0.01 NG/ML (ref 0–0.03)
TROPONIN T SERPL-MCNC: <0.01 NG/ML (ref 0–0.03)
WBC # BLD AUTO: 6.49 10*3/MM3 (ref 3.4–10.8)
WHOLE BLOOD HOLD SPECIMEN: NORMAL
WHOLE BLOOD HOLD SPECIMEN: NORMAL

## 2021-01-12 PROCEDURE — 93005 ELECTROCARDIOGRAM TRACING: CPT | Performed by: EMERGENCY MEDICINE

## 2021-01-12 PROCEDURE — 84484 ASSAY OF TROPONIN QUANT: CPT | Performed by: EMERGENCY MEDICINE

## 2021-01-12 PROCEDURE — 85025 COMPLETE CBC W/AUTO DIFF WBC: CPT | Performed by: EMERGENCY MEDICINE

## 2021-01-12 PROCEDURE — 71045 X-RAY EXAM CHEST 1 VIEW: CPT

## 2021-01-12 PROCEDURE — 83690 ASSAY OF LIPASE: CPT | Performed by: EMERGENCY MEDICINE

## 2021-01-12 PROCEDURE — 99284 EMERGENCY DEPT VISIT MOD MDM: CPT

## 2021-01-12 PROCEDURE — 80053 COMPREHEN METABOLIC PANEL: CPT | Performed by: EMERGENCY MEDICINE

## 2021-01-12 PROCEDURE — 83880 ASSAY OF NATRIURETIC PEPTIDE: CPT | Performed by: EMERGENCY MEDICINE

## 2021-01-12 PROCEDURE — 93005 ELECTROCARDIOGRAM TRACING: CPT

## 2021-01-12 RX ORDER — NAPROXEN 500 MG/1
500 TABLET ORAL 2 TIMES DAILY WITH MEALS
Qty: 20 TABLET | Refills: 0 | Status: SHIPPED | OUTPATIENT
Start: 2021-01-12 | End: 2021-09-24

## 2021-01-12 RX ORDER — KETOROLAC TROMETHAMINE 5 MG/ML
SOLUTION OPHTHALMIC
COMMUNITY
End: 2021-09-24 | Stop reason: ALTCHOICE

## 2021-01-12 RX ORDER — SODIUM CHLORIDE 0.9 % (FLUSH) 0.9 %
10 SYRINGE (ML) INJECTION AS NEEDED
Status: DISCONTINUED | OUTPATIENT
Start: 2021-01-12 | End: 2021-01-12 | Stop reason: HOSPADM

## 2021-01-12 RX ORDER — ASPIRIN 81 MG/1
324 TABLET, CHEWABLE ORAL ONCE
Status: DISCONTINUED | OUTPATIENT
Start: 2021-01-12 | End: 2021-01-12

## 2021-01-12 NOTE — ED PROVIDER NOTES
EMERGENCY DEPARTMENT ENCOUNTER      Pt Name: Judah Cerna  MRN: [id number]  YOB: 1955  Date of evaluation: 1/12/2021  Provider: Zurdo Martins DO    CHIEF COMPLAINT       Chief Complaint   Patient presents with   • Chest Pain         HISTORY OF PRESENT ILLNESS  (Location/Symptom, Timing/Onset, Context/Setting, Quality, Duration, Modifying Factors, Severity.)   Judah Cerna is a 65 y.o. male who presents to the emergency department for evaluation of intermittent sharp quick on quick off chest pain which originates along under the right breast, some mild radiation towards the sternum which is been intermittent in nature for the last 5 weeks per the patient.  Denies any chest pain or pressure associated with exercise or running on his treadmill.  He is a status post 5 vessel bypass few years ago, follows with cardiologist, Dr. Stevens, and has been compliant with his medications.  The pain he states is very sharp and quick and can happen just while at rest.  Denies any abdominal pain, no nausea, vomiting, diarrhea.  No falls, injury or trauma.  Currently denies any chest pain this morning.  He has no other acute systemic complaints at this time.  No history of any pulmonary disease history of recurrent pleurisy.  Denies any cough congestion, recent respiratory illness.  Denies any pain after eating.      Nursing notes were reviewed.    REVIEW OF SYSTEMS    (2-9 systems for level 4, 10 or more for level 5)   ROS:  General:  No fevers, no chills, no weakness  Cardiovascular: Intermittent sharp right-sided chest pain, no palpitations  Respiratory:  No shortness of breath, no cough, no wheezing  Gastrointestinal:  No pain, no nausea, no vomiting, no diarrhea  Musculoskeletal:  No muscle pain, no joint pain  Skin:  No rash, no easy bruising  Neurologic:  No speech problems, no headache  Psychiatric:  No anxiety  Genitourinary:  No dysuria, no hematuria    Except as noted above the remainder of  the review of systems was reviewed and negative.       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   • CARDIAC CATHETERIZATION     • CARDIAC CATHETERIZATION N/A 9/29/2018    Procedure: Left Heart Cath;  Surgeon: Nadir Tanner MD;  Location:  [facility];  Service: Cardiology   • CORONARY ARTERY BYPASS GRAFT N/A 10/2/2018    Procedure: MEDIAN STERNOTOMY,CORONARY ARTERY BYPASS WITH INTERNAL MAMMARY ARTERY GRAFT  X 5, EVH OF THE RIGHT GREATER SAPHENOUS VEIN;  Surgeon: Shane Bosch MD;  Location:  [facility];  Service: Cardiothoracic   • CORONARY STENT PLACEMENT      x 2         CURRENT MEDICATIONS       Current Facility-Administered Medications:   •  sodium chloride 0.9 % flush 10 mL, 10 mL, Intravenous, PRN, Zurdo Martins, DO    Current Outpatient Medications:   •  ALPRAZolam (XANAX) 0.5 MG tablet, Take 0.5 mg by mouth Daily As Needed., Disp: , Rfl:   •  aspirin 325 MG EC tablet, Take 1 tablet by mouth Daily., Disp: 30 tablet, Rfl: 1  •  glimepiride (AMARYL) 4 MG tablet, Take 4 mg by mouth Every Morning Before Breakfast., Disp: , Rfl:   •  HYDROcodone-acetaminophen (NORCO) 5-325 MG per tablet, Take 1 tablet by mouth 2 (Two) Times a Day As Needed (Knee Pain)., Disp: , Rfl:   •  metFORMIN (GLUCOPHAGE) 1000 MG tablet, Take 1,000 mg by mouth Daily With Breakfast., Disp: , Rfl:   •  nebivolol (BYSTOLIC) 5 MG tablet, Take 5 mg by mouth Daily., Disp: , Rfl:   •  rosuvastatin (CRESTOR) 20 MG tablet, Take 1 tablet by mouth Daily., Disp: 90 tablet, Rfl: 3  •  vitamin B-12 (CYANOCOBALAMIN) 500 MCG tablet, Take 1 tablet by mouth Daily. (Patient taking differently: Take 1,000 mcg by mouth Daily.), Disp: 30 tablet, Rfl:  0  •  ketorolac (ACULAR) 0.5 % ophthalmic solution, ketorolac 0.5 % eye drops  INSTILL 1 DROP (0.25 MG) INTO AFFECTED EYE(S) BY OPHTHALMIC ROUTE 4 TIMES PER DAY, Disp: , Rfl:   •  naproxen (Naprosyn) 500 MG tablet, Take 1 tablet by mouth 2 (Two) Times a Day With Meals., Disp: 20 tablet, Rfl: 0    ALLERGIES     Patient has no known allergies.    FAMILY HISTORY       Family History   Problem Relation Age of Onset   • No Known Problems Mother    • No Known Problems Father           SOCIAL HISTORY       Social History     Socioeconomic History   • Marital status:      Spouse name: Not on file   • Number of children: 2   • Years of education: Not on file   • Highest education level: Not on file   Occupational History   • Occupation:    Tobacco Use   • Smoking status: Never Smoker   • Smokeless tobacco: Never Used   Substance and Sexual Activity   • Alcohol use: No   • Drug use: No   • Sexual activity: Defer   Social History Narrative    Lives in [place], [place] with spouse and grandson.         PHYSICAL EXAM    (up to 7 for level 4, 8 or more for level 5)     Vitals:    01/12/21 0500 01/12/21 0515 01/12/21 0530 01/12/21 0600   BP: 134/82  135/78 149/97   Pulse: 55 61 58 58   Resp:       Temp:       TempSrc:       SpO2: 97% 97% 97% 98%   Weight:       Height:           Physical Exam  General : Patient is awake, alert, oriented, in no acute distress, nontoxic appearing  HEENT: Pupils are equally round and reactive to light, EOMI, conjunctivae clear, sclerae white, there is no injection no icterus.  Oral mucosa is moist, no exudate. Uvula is midline  Neck: Neck is supple, full range of motion, trachea midline  Cardiac: Heart regular rate, rhythm, no murmurs, rubs, or gallops  Lungs: Lungs are clear to auscultation, there is no wheezing, rhonchi, or rales. There is no use of accessory muscles  Chest wall: Postsurgical sternotomy, healed,  there is no tenderness to palpation over the chest wall or over ribs.   Equal bilateral breath sounds, equal chest rise  Abdomen: Abdomen is soft, nontender, nondistended. There are no firm or pulsatile masses, no rebound rigidity or guarding.   Musculoskeletal: 5 out of 5 strength in all 4 extremities.  No focal muscle deficits are appreciated  Neuro: Motor intact, sensory intact, level of consciousness is normal  Dermatology: Skin is warm and dry  Psych: Mentation is grossly normal, cognition is grossly normal. Affect is appropriate.      DIAGNOSTIC RESULTS     EKG: All EKG's are interpreted by the Emergency Department Physician who either signs or Co-signs this chart in the absence of a cardiologist.    ECG 12 Lead         ECG 12 Lead   Final Result   Test Reason : chest pain   Blood Pressure : **/** mmHG   Vent. Rate : 063 BPM     Atrial Rate : 063 BPM      P-R Int : 156 ms          QRS Dur : 082 ms       QT Int : 404 ms       P-R-T Axes : 074 030 -06 degrees      QTc Int : 413 ms      Sinus rhythm with   Nonspecific T wave abnormality   Abnormal ECG   When compared with ECG of 04-OCT-2018 04:40,   Nonspecific T wave abnormality no longer evident in Anterior leads   QT has shortened   Confirmed by TAMEKA DOUGLAS MD ([id number]) on 1/12/2021 3:07:35 AM      Referred By:  EDMD           Confirmed By:TAMEKA DOUGLAS MD          RADIOLOGY:   Non-plain film images such as CT, Ultrasound and MRI are read by the radiologist. Plain radiographic images are visualized and preliminarily interpreted by the emergency physician with the below findings:      [] Radiologist's Report Reviewed:  XR Chest 1 View   Final Result   No acute cardiopulmonary findings.      Signer Name: Vinh Smith MD    Signed: 1/12/2021 3:56 AM    Workstation Name: [email or web address]     [facility]            ED BEDSIDE ULTRASOUND:   Performed by ED Physician - none    LABS:    I have reviewed and interpreted all of the currently available lab results from this visit (if applicable):  Results for orders  placed or performed during the hospital encounter of 01/12/21   Troponin    Specimen: Blood   Result Value Ref Range    Troponin T <0.010 0.000 - 0.030 ng/mL   Troponin    Specimen: Blood   Result Value Ref Range    Troponin T <0.010 0.000 - 0.030 ng/mL   Comprehensive Metabolic Panel    Specimen: Blood   Result Value Ref Range    Glucose 129 (H) 65 - 99 mg/dL    BUN 9 8 - 23 mg/dL    Creatinine 0.98 0.76 - 1.27 mg/dL    Sodium 138 136 - 145 mmol/L    Potassium 4.0 3.5 - 5.2 mmol/L    Chloride 103 98 - 107 mmol/L    CO2 23.0 22.0 - 29.0 mmol/L    Calcium 9.6 8.6 - 10.5 mg/dL    Total Protein 6.8 6.0 - 8.5 g/dL    Albumin 4.00 3.50 - 5.20 g/dL    ALT (SGPT) 21 1 - 41 U/L    AST (SGOT) 31 1 - 40 U/L    Alkaline Phosphatase 69 39 - 117 U/L    Total Bilirubin 0.4 0.0 - 1.2 mg/dL    eGFR  African Amer 93 >60 mL/min/1.73    Globulin 2.8 gm/dL    A/G Ratio 1.4 g/dL    BUN/Creatinine Ratio 9.2 7.0 - 25.0    Anion Gap 12.0 5.0 - 15.0 mmol/L   Lipase    Specimen: Blood   Result Value Ref Range    Lipase 59 13 - 60 U/L   BNP    Specimen: Blood   Result Value Ref Range    proBNP 27.4 0.0 - 900.0 pg/mL   CBC Auto Differential    Specimen: Blood   Result Value Ref Range    WBC 6.49 3.40 - 10.80 10*3/mm3    RBC 5.76 4.14 - 5.80 10*6/mm3    Hemoglobin 16.2 13.0 - 17.7 g/dL    Hematocrit 49.7 37.5 - 51.0 %    MCV 86.3 79.0 - 97.0 fL    MCH 28.1 26.6 - 33.0 pg    MCHC 32.6 31.5 - 35.7 g/dL    RDW 12.9 12.3 - 15.4 %    RDW-SD 40.0 37.0 - 54.0 fl    MPV 11.4 6.0 - 12.0 fL    Platelets 151 140 - 450 10*3/mm3    Neutrophil % 54.3 42.7 - 76.0 %    Lymphocyte % 32.4 19.6 - 45.3 %    Monocyte % 8.2 5.0 - 12.0 %    Eosinophil % 4.3 0.3 - 6.2 %    Basophil % 0.6 0.0 - 1.5 %    Immature Grans % 0.2 0.0 - 0.5 %    Neutrophils, Absolute 3.53 1.70 - 7.00 10*3/mm3    Lymphocytes, Absolute 2.10 0.70 - 3.10 10*3/mm3    Monocytes, Absolute 0.53 0.10 - 0.90 10*3/mm3    Eosinophils, Absolute 0.28 0.00 - 0.40 10*3/mm3    Basophils, Absolute 0.04 0.00 -  0.20 10*3/mm3    Immature Grans, Absolute 0.01 0.00 - 0.05 10*3/mm3    nRBC 0.0 0.0 - 0.2 /100 WBC   ECG 12 Lead   Result Value Ref Range    QT Interval 404 ms    QTC Interval 413 ms   Light Blue Top   Result Value Ref Range    Extra Tube hold for add-on    Green Top (Gel)   Result Value Ref Range    Extra Tube Hold for add-ons.    Lavender Top   Result Value Ref Range    Extra Tube hold for add-on         All other labs were within normal range or not returned as of this dictation.      EMERGENCY DEPARTMENT COURSE and DIFFERENTIAL DIAGNOSIS/MDM:   Vitals:    Vitals:    01/12/21 0500 01/12/21 0515 01/12/21 0530 01/12/21 0600   BP: 134/82  135/78 149/97   Pulse: 55 61 58 58   Resp:       Temp:       TempSrc:       SpO2: 97% 97% 97% 98%   Weight:       Height:                Patient with a history of coronary disease, post CABG with right-sided intermittent chest pain over the last 5 weeks.  None currently on my examination, does not appear acutely ill or toxic, is very quick on, sharp in nature and goes away immediately.  Does not appear to be associated with any type of exertion, does not appear anginal in nature.  Given his history we will still proceed with IV, labs, imaging for further evaluation.  Labs and imaging reviewed as above, no significant abnormalities, repeat troponin, EKG also unremarkable.  I have to the patient his results, he is feeling better on my reevaluation.  We discussed given his history with like to have him follow-up with his cardiologist for reevaluation, patient states he will prefer to follow-up as an outpatient, will call his cardiologist later today to establish a follow-up appointment.  He understands if he has any worsening symptoms, any recurrent chest pain or any further concerns he will return back to the emergency department for evaluation. I had a discussion with the patient/family regarding diagnosis, diagnostic results, treatment plan, and medications.  The patient/family  indicated understanding of these instructions.  I spent adequate time at the bedside preceding discharge necessary to personally discuss the aftercare instructions, giving patient education, providing explanations of the results of our evaluations/findings, and my decision making to assure that the patient/family understand the plan of care.  Time was allotted to answer questions at that time and throughout the ED course.  Emphasis was placed on timely follow-up after discharge.  I also discussed the potential for the development of an acute emergent condition requiring further evaluation, admission, or even surgical intervention. I discussed that we found nothing during the visit today indicating the need for further workup, admission, or the presence of an unstable medical condition.  I encouraged the patient to return to the emergency department immediately for ANY concerns, worsening, new complaints, or if symptoms persist and unable to seek follow-up in a timely fashion.  The patient/family expressed understanding and agreement with this plan.  The patient will follow-up with their PCP in 1-2 days for reevaluation.       MEDICATIONS ADMINISTERED IN ED:  Medications   sodium chloride 0.9 % flush 10 mL (has no administration in time range)       PROCEDURES:  Procedures    CRITICAL CARE TIME    Total Critical Care time was 0 minutes, excluding separately reportable procedures.   There was a high probability of clinically significant/life threatening deterioration in the patient's condition which required my urgent intervention.      FINAL IMPRESSION      1. Right-sided chest pain          DISPOSITION/PLAN     ED Disposition     ED Disposition Condition Comment    Discharge Stable           PATIENT REFERRED TO:  Justin Casas, DANIEL  [address]  [address]  [place] [id number] [phone]    In 2 days      Kay Stevens MD  [address]  [facility] [address]  [facility] [address]  [phone]    Schedule  an appointment as soon as possible for a visit   Cardiologist    [facility] Emergency Department  [address] [facility] [address] [phone]    If symptoms worsen      DISCHARGE MEDICATIONS:     Medication List      START taking these medications    naproxen 500 MG tablet  Commonly known as: Naprosyn  Take 1 tablet by mouth 2 (Two) Times a Day With Meals.        ASK your doctor about these medications    ALPRAZolam 0.5 MG tablet  Commonly known as: XANAX     aspirin  MG tablet  Take 1 tablet by mouth Daily.     glimepiride 4 MG tablet  Commonly known as: AMARYL     HYDROcodone-acetaminophen 5-325 MG per tablet  Commonly known as: NORCO     ketorolac 0.5 % ophthalmic solution  Commonly known as: ACULAR     metFORMIN 1000 MG tablet  Commonly known as: GLUCOPHAGE     nebivolol 5 MG tablet  Commonly known as: BYSTOLIC     rosuvastatin 20 MG tablet  Commonly known as: CRESTOR  Take 1 tablet by mouth Daily.     vitamin B-12 500 MCG tablet  Commonly known as: CYANOCOBALAMIN  Take 1 tablet by mouth Daily.           Where to Get Your Medications      These medications were sent to [facility]/pharmacy #[id number] - [place], [place] - [address] - [phone]  - [phone]   [address] [place] [address]    Phone: [phone]   · naproxen 500 MG tablet             Comment: Please note this report has been produced using speech recognition software.      Zurdo Martins DO  Attending Emergency Physician               Zurdo Martins DO  01/12/21 0637

## 2021-01-20 NOTE — PROGRESS NOTES
"        Encounter Date:01/21/2021      Patient ID: Judah Cerna is a 65 y.o. male.    Justin Casas, DANIEL    Chief Complaint: Coronary Artery Disease      PROBLEM LIST:  Patient Active Problem List    Diagnosis Date Noted   • CAD (coronary artery disease)      Priority: High     Note Last Updated: 1/20/2021     a. History of \"small heart attack,\" in 1999. cardiac catheterization study followed by 2 coronary stents; incomplete database.  b. [facility] 9-29-18: Mildly abnormal left ventriculogram with an estimated ejection fraction of 60% and mild apical hypokinesia. Severe 3 vessel disease   c. CABGx5 (SVx4, VERITO x 1).[facility] 10-2-2018     • Abnormal EKG 10/16/2017     Priority: Medium   • Hypertension      Priority: Medium   • Dyslipidemia      Priority: Medium   • Type 2 diabetes mellitus (CMS/[facility])      Priority: Low               History of Present Illness  Patient presents today for follow-up with a history of CAD status post CABG, hypertension, dyslipidemia.  He returns today sooner than scheduled for evaluation of periodic chest discomfort which occurs intermittently and is superficial.  Typically occurs during exercise but does not affect his ability to complete his exercise which may go up to 40 to 50 minutes.  States it feels like a \"pulling\" sensation in the area of his midsternal scar.  He does not have chest heaviness or crushing sensation.  His chest discomfort is not associated with shortness of breath, nausea, diaphoresis or radiating pain.  He continues to exercise 4-5 times per week.  His blood pressure at home typically runs between 110 to 130 mmHg systolic.  He remains on high-dose statin therapy which is well-tolerated.  He believes he has had a recent lipid panel with primary care but does not recall the numbers. He is maintaining an active lifestyle.  States compliance with current medical regimen reports no significant adverse side effects.      No Known Allergies    Current Outpatient Medications " "  Medication Instructions   • ALPRAZolam (XANAX) 0.5 mg, Oral, Daily PRN   • aspirin  mg, Oral, Daily   • glimepiride (AMARYL) 4 mg, Oral, Every Morning Before Breakfast   • HYDROcodone-acetaminophen (NORCO) 5-325 MG per tablet 1 tablet, Oral, 2 Times Daily PRN   • ketorolac (ACULAR) 0.5 % ophthalmic solution ketorolac 0.5 % eye drops   INSTILL 1 DROP (0.25 MG) INTO AFFECTED EYE(S) BY OPHTHALMIC ROUTE 4 TIMES PER DAY   • metFORMIN (GLUCOPHAGE) 1,000 mg, Oral, Daily With Breakfast   • naproxen (NAPROSYN) 500 mg, Oral, 2 Times Daily With Meals   • nebivolol (BYSTOLIC) 5 mg, Oral, Daily   • rosuvastatin (CRESTOR) 20 mg, Oral, Daily   • vitamin B-12 (CYANOCOBALAMIN) 500 mcg, Oral, Daily       The following portions of the patient's history were reviewed and updated as appropriate: allergies, current medications, past family history, past medical history, past social history, past surgical history and problem list.  .    Objective:     /80 (BP Location: Left arm, Patient Position: Sitting)   Pulse 66   Temp 97.1 °F (36.2 °C)   Ht 162.6 cm (64.02\")   Wt 80.4 kg (177 lb 3.2 oz)   SpO2 98%   BMI 30.40 kg/m²    Body mass index is 30.4 kg/m².     Constitutional:       Appearance: Well-developed.   Pulmonary:      Effort: Pulmonary effort is normal. No respiratory distress.      Breath sounds: Normal breath sounds. No wheezing. No rales.      Comments: Bases clear  Chest:      Chest wall: Not tender to palpatation.   Cardiovascular:      Mild chest discomfort with sternal palpation Normal rate. Regular rhythm.      Murmurs: There is no murmur.      No gallop. No click. No rub.   Pulses:     Intact distal pulses.   Edema:     Peripheral edema absent.   Musculoskeletal: Normal range of motion.   Neurological:      Mental Status: Alert and oriented to person, place, and time.         Lab Review:                     Lab Results   Component Value Date    HGBA1C 6.30 (H) 09/29/2018     Lab Results   Component Value " Date    CHOL 126 01/07/2019    CHOL 91 09/29/2018     Lab Results   Component Value Date    TRIG 239 (H) 01/07/2019    TRIG 98 09/29/2018     Lab Results   Component Value Date    HDL 35 (L) 01/07/2019    HDL 26 (L) 09/29/2018     Lab Results   Component Value Date    LDL 65 01/07/2019    LDL 44 09/29/2018         Procedures             Assessment:      Diagnosis Plan   1. Coronary artery disease involving coronary bypass graft of native heart without angina pectoris   current chest discomfort is most consistent with muscle skeletal pain related to heavy exercise.  I do not feel that this is an anginal equivalent due to the intensity of his exercise and inconsistency in eliciting of symptoms.  I have counseled him that if his chest discomfort should change in nature to a consistent chest heaviness or exertional dyspnea he should contact our office for reevaluation.  Continue current medical regimen.   2. Essential hypertension   well managed, continue current medical regimen   3. Dyslipidemia   tolerating high-dose statin and managed by primary care     Plan:     Stable cardiac status.  Continue current medications.   in 6 months, sooner as needed.  Thank you for allowing us to participate in the care of your patient.     Electronically signed by GREGG Amaya, 01/21/21, 2:14 PM EST.      Please note that portions of this note may have been completed with a voice recognition program. Efforts were made to edit the dictations, but occasionally words are mis-translated.

## 2021-01-21 ENCOUNTER — OFFICE VISIT (OUTPATIENT)
Dept: CARDIOLOGY | Facility: CLINIC | Age: 66
End: 2021-01-21

## 2021-01-21 VITALS
WEIGHT: 177.2 LBS | HEART RATE: 66 BPM | SYSTOLIC BLOOD PRESSURE: 130 MMHG | BODY MASS INDEX: 30.25 KG/M2 | TEMPERATURE: 97.1 F | OXYGEN SATURATION: 98 % | DIASTOLIC BLOOD PRESSURE: 80 MMHG | HEIGHT: 64 IN

## 2021-01-21 DIAGNOSIS — I10 ESSENTIAL HYPERTENSION: ICD-10-CM

## 2021-01-21 DIAGNOSIS — I25.810 CORONARY ARTERY DISEASE INVOLVING CORONARY BYPASS GRAFT OF NATIVE HEART WITHOUT ANGINA PECTORIS: Primary | ICD-10-CM

## 2021-01-21 DIAGNOSIS — E78.5 DYSLIPIDEMIA: ICD-10-CM

## 2021-01-21 PROCEDURE — 99214 OFFICE O/P EST MOD 30 MIN: CPT | Performed by: PHYSICIAN ASSISTANT

## 2021-09-23 NOTE — PROGRESS NOTES
"[facility] Cardiology    Encounter Date: 2021    Patient ID: Judah Cerna is a 65 y.o. male.  : 1955     PCP: Justin Casas APRN       Chief Complaint: Coronary artery disease involving coronary bypass graft of n      PROBLEM LIST:  1. Coronary artery disease  a. History of \"small heart attack,\" in . cardiac catheterization study followed by 2 coronary stents; incomplete database.  b. [facility] 2018: Mildly abnormal left ventriculogram with an estimated ejection fraction of 60% and mild apical hypokinesia. Severe 3 vessel disease   c. CABGx5 (SVx4, VERITO x 1). [facility] 10/2/2018  2. Abnormal EKG  3. Hypertension   4. Dyslipidemia   5. Type 2 diabetes mellitus    History of Present Illness  Patient is a 65-year-old -American male who is here today for follow-up of coronary artery disease.  Since last being seen he notes to overall be doing well.  He denies any chest pain, pressure, tightness.  He denies any increasing shortness of breath.  No syncope, near-syncope, or edema.  He is very active without any anginal complaints.  States he is compliant with his medications.  Had blood work performed recently with his primary care.    No Known Allergies      Current Outpatient Medications:   •  ALPRAZolam (XANAX) 0.5 MG tablet, Take 0.5 mg by mouth Daily As Needed., Disp: , Rfl:   •  aspirin 325 MG EC tablet, Take 1 tablet by mouth Daily., Disp: 30 tablet, Rfl: 1  •  glimepiride (AMARYL) 4 MG tablet, Take 4 mg by mouth Every Morning Before Breakfast., Disp: , Rfl:   •  HYDROcodone-acetaminophen (NORCO) 5-325 MG per tablet, Take 1 tablet by mouth 2 (Two) Times a Day As Needed (Knee Pain)., Disp: , Rfl:   •  metFORMIN (GLUCOPHAGE) 1000 MG tablet, Take 1,000 mg by mouth Daily With Breakfast., Disp: , Rfl:   •  nebivolol (BYSTOLIC) 5 MG tablet, Take 5 mg by mouth Daily., Disp: , Rfl:   •  rosuvastatin (CRESTOR) 20 MG tablet, Take 1 tablet by mouth Daily., Disp: 90 " "tablet, Rfl: 3  •  tadalafil (CIALIS) 10 MG tablet, Take 1 tablet by mouth As Needed., Disp: , Rfl:   •  vitamin B-12 (CYANOCOBALAMIN) 500 MCG tablet, Take 1 tablet by mouth Daily. (Patient taking differently: Take 2,000 mcg by mouth Daily.), Disp: 30 tablet, Rfl: 0    The following portions of the patient's history were reviewed and updated as appropriate: allergies, current medications, past family history, past medical history, past social history, past surgical history and problem list.    ROS  Review of Systems   Constitution: Negative for chills, fever, fatigue, generalized weakness.   Cardiovascular: Negative for chest pain, dyspnea on exertion, leg swelling, palpitations, orthopnea, and syncope.   Respiratory: Negative for cough, shortness of breath, and wheezing.  HENT: Negative for ear pain, nosebleeds, and tinnitus.  Gastrointestinal: Negative for abdominal pain, constipation, diarrhea, nausea and vomiting.   Genitourinary: No urinary symptoms.  Musculoskeletal: Negative for muscle cramps.  Neurological: Negative for dizziness, headaches, loss of balance, numbness, and symptoms of stroke.  Psychiatric: Normal mental status.     All other systems reviewed and are negative.        Objective:     /76 (BP Location: Left arm, Patient Position: Sitting, Cuff Size: Adult)   Pulse 75   Ht 162.6 cm (64\")   Wt 79.8 kg (176 lb)   SpO2 98%   BMI 30.21 kg/m²      Vitals reviewed.   Constitutional:       Appearance: Healthy appearance. Well-developed and not in distress.   Neck:      Vascular: No JVD.      Trachea: No tracheal deviation.   Pulmonary:      Effort: Pulmonary effort is normal.      Breath sounds: Normal breath sounds.   Cardiovascular:      Normal rate. Regular rhythm.   Edema:     Peripheral edema absent.   Abdominal:      General: Bowel sounds are normal.      Palpations: Abdomen is soft.      Tenderness: There is no abdominal tenderness.   Musculoskeletal:         General: No deformity. " Skin:     General: Skin is warm and dry.   Neurological:      Mental Status: Alert and oriented to person, place, and time.           Data Review:   Lab Results   Component Value Date    GLUCOSE 129 (H) 01/12/2021    BUN 9 01/12/2021    CREATININE 0.98 01/12/2021    EGFRIFAFRI 93 01/12/2021    BCR 9.2 01/12/2021    K 4.0 01/12/2021    CO2 23.0 01/12/2021    CALCIUM 9.6 01/12/2021    ALBUMIN 4.00 01/12/2021    AST 31 01/12/2021    ALT 21 01/12/2021     Lab Results   Component Value Date    CHOL 126 01/07/2019    TRIG 239 (H) 01/07/2019    HDL 35 (L) 01/07/2019    LDL 65 01/07/2019      Lab Results   Component Value Date    WBC 6.49 01/12/2021    RBC 5.76 01/12/2021    HGB 16.2 01/12/2021    HCT 49.7 01/12/2021    MCV 86.3 01/12/2021     01/12/2021        Procedures       Assessment:      Diagnosis Plan   1. Coronary artery disease involving coronary bypass graft of native heart without angina pectoris   stable.  No angina.  On aspirin.   2. Essential hypertension  , controlled on Bystolic   3. Dyslipidemia   on statin therapy.  Labs with primary care.     Plan:   1. Continue current cardiac medications  2. Obtain recent lipid from primary care.  3. Overall stable.  4. Follow-up in 1 year's time or sooner if needed.        DANIEL Guerrero

## 2021-09-24 ENCOUNTER — OFFICE VISIT (OUTPATIENT)
Dept: CARDIOLOGY | Facility: CLINIC | Age: 66
End: 2021-09-24

## 2021-09-24 VITALS
WEIGHT: 176 LBS | OXYGEN SATURATION: 98 % | HEIGHT: 64 IN | HEART RATE: 75 BPM | BODY MASS INDEX: 30.05 KG/M2 | DIASTOLIC BLOOD PRESSURE: 76 MMHG | SYSTOLIC BLOOD PRESSURE: 124 MMHG

## 2021-09-24 DIAGNOSIS — I10 ESSENTIAL HYPERTENSION: ICD-10-CM

## 2021-09-24 DIAGNOSIS — E78.5 DYSLIPIDEMIA: ICD-10-CM

## 2021-09-24 DIAGNOSIS — I25.810 CORONARY ARTERY DISEASE INVOLVING CORONARY BYPASS GRAFT OF NATIVE HEART WITHOUT ANGINA PECTORIS: Primary | ICD-10-CM

## 2021-09-24 PROCEDURE — 99214 OFFICE O/P EST MOD 30 MIN: CPT | Performed by: INTERNAL MEDICINE

## 2021-09-24 RX ORDER — TADALAFIL 10 MG/1
1 TABLET ORAL AS NEEDED
COMMUNITY
Start: 2021-08-14

## 2022-09-26 NOTE — PROGRESS NOTES
"[facility] Cardiology    Encounter Date: 2022    Patient ID: Judah Cerna is a 66 y.o. male.  : 1955     PCP: Justin Casas APRN       Chief Complaint: Coronary artery disease involving coronary bypass graft of       PROBLEM LIST:  1. Coronary artery disease  a. History of \"small heart attack,\" in . cardiac catheterization study followed by 2 coronary stents; incomplete database.  b. [facility], 2018: Mildly abnormal left ventriculogram with an estimated ejection fraction of 60% and mild apical hypokinesia. Severe 3 vessel disease   c. Carotid duplex, 2018: There is bilateral internal carotid artery narrowing of <50%. Bilateral vertebral artery flow is antegrade.  d. CABGx5 (SVx4, VERITO x 1). [facility] 10/02/2018  2. Abnormal EKG  3. Hypertension   4. Dyslipidemia   5. Type 2 diabetes mellitus    History of Present Illness  Patient presents today for a 1 year follow-up with a history of coronary artery disease and cardiac risk factors. Since last visit, patient has been doing well overall from a cardiovascular standpoint. Patient maintains a stable activity level by walking regularly and states that he also works 3 to 4 days a week.  The patient denies chest pain, shortness of breath, orthopnea, palpitations, edema, dizziness, and syncope.     No Known Allergies      Current Outpatient Medications:   •  ALPRAZolam (XANAX) 0.5 MG tablet, Take 0.5 mg by mouth Daily As Needed., Disp: , Rfl:   •  aspirin 325 MG EC tablet, Take 1 tablet by mouth Daily., Disp: 30 tablet, Rfl: 1  •  glimepiride (AMARYL) 4 MG tablet, Take 4 mg by mouth Every Morning Before Breakfast., Disp: , Rfl:   •  HYDROcodone-acetaminophen (NORCO) 5-325 MG per tablet, Take 1 tablet by mouth 2 (Two) Times a Day As Needed (Knee Pain)., Disp: , Rfl:   •  metFORMIN (GLUCOPHAGE) 1000 MG tablet, Take 1,000 mg by mouth Daily With Breakfast., Disp: , Rfl:   •  nebivolol (BYSTOLIC) 5 MG tablet, Take 5 mg " "by mouth Daily., Disp: , Rfl:   •  rosuvastatin (CRESTOR) 20 MG tablet, Take 1 tablet by mouth Daily., Disp: 90 tablet, Rfl: 3  •  tadalafil (CIALIS) 10 MG tablet, Take 1 tablet by mouth As Needed., Disp: , Rfl:   •  vitamin B-12 (CYANOCOBALAMIN) 500 MCG tablet, Take 1 tablet by mouth Daily. (Patient taking differently: Take 2,000 mcg by mouth Daily.), Disp: 30 tablet, Rfl: 0    The following portions of the patient's history were reviewed and updated as appropriate: allergies, current medications, past family history, past medical history, past social history, past surgical history and problem list.    ROS  Review of Systems   Constitution: Negative for chills, fever, fatigue, generalized weakness.   Cardiovascular: Negative for chest pain, dyspnea on exertion, leg swelling, palpitations, orthopnea, and syncope.   Respiratory: Negative for cough, shortness of breath, and wheezing.  HENT: Negative for ear pain, nosebleeds, and tinnitus.  Gastrointestinal: Negative for abdominal pain, constipation, diarrhea, nausea and vomiting.   Genitourinary: No urinary symptoms.  Musculoskeletal: Negative for muscle cramps.  Neurological: Negative for dizziness, headaches, loss of balance, numbness, and symptoms of stroke.  Psychiatric: Normal mental status.     All other systems reviewed and are negative.        Objective:     /68 (BP Location: Left arm, Patient Position: Sitting)   Pulse 62   Ht 162.6 cm (64\")   Wt 78.9 kg (174 lb)   SpO2 97%   BMI 29.87 kg/m²      Physical Exam  Constitutional: Patient appears well-developed and well-nourished.   HENT: HEENT exam unremarkable.   Neck: Neck supple. No JVD present. No carotid bruits.   Cardiovascular: Normal rate, regular rhythm and normal heart sounds. No murmur heard.   2+ symmetric pulses.   Pulmonary/Chest: Breath sounds normal. Does not exhibit tenderness.   Abdominal: Abdomen benign.   Musculoskeletal: Does not exhibit edema.   Neurological: Neurological exam " unremarkable.   Vitals reviewed.    Data Review:     Lab date: 08/29/2022  • FLP: , , HDL 35, LDL 66  • CMP: Glu 146, BUN 11, Creat 1.13, eGFR >60, Na 139, K 3.7, Cl 103, CO2 24, Ca 9.8, Alk Phos 62, AST 18, ALT 17  • CBC: WBC 6.0, RBC 5.49, HGB 15.2, HCT 45.5, MCV 83, MCH 27.7,          ECG 12 Lead    Date/Time: 9/30/2022 12:41 PM  Performed by: Kay Stevens MD  Authorized by: Kay Stevens MD   Comparison: compared with previous ECG from 1/2/2021  Rhythm: sinus rhythm  BPM: 62    Clinical impression: abnormal EKG  Comments: Premature atrial complexes  T wave abnormality, consider inferolateral ischemia                     Assessment:      Diagnosis Plan   1. Coronary artery disease involving coronary bypass graft of native heart without angina pectoris  Stable without angina on current activity. Continue on aspirin 325 mg daily.  Continue current GDMT.   2. Essential hypertension  Well controlled. Continue on nebivolol 5 mg daily.    3. Dyslipidemia  Well controlled. LDL 66 on 08/29/2022. Continue on rosuvastatin 20 mg daily.      Plan:   Stable cardiac status. No current angina or CHF symptoms.  Active lifestyle but  Continue current medications.   FU in 12 MO, sooner as needed.  Thank you for allowing us to participate in the care of your patient.     Scribed for Kay Stevens MD by Claudine Gonzalez. 9/30/2022 12:40 EDT    I, Kay Stevens MD, personally performed the services described in this documentation as scribed by the above named individual in my presence, and it is both accurate and complete.  9/30/2022  12:50 EDT      Please note that portions of this note may have been completed with a voice recognition program. Efforts were made to edit the dictations, but occasionally words are mistranscribed.

## 2022-09-30 ENCOUNTER — OFFICE VISIT (OUTPATIENT)
Dept: CARDIOLOGY | Facility: CLINIC | Age: 67
End: 2022-09-30

## 2022-09-30 VITALS
HEART RATE: 62 BPM | SYSTOLIC BLOOD PRESSURE: 118 MMHG | OXYGEN SATURATION: 97 % | WEIGHT: 174 LBS | DIASTOLIC BLOOD PRESSURE: 68 MMHG | BODY MASS INDEX: 29.71 KG/M2 | HEIGHT: 64 IN

## 2022-09-30 DIAGNOSIS — I25.810 CORONARY ARTERY DISEASE INVOLVING CORONARY BYPASS GRAFT OF NATIVE HEART WITHOUT ANGINA PECTORIS: Primary | ICD-10-CM

## 2022-09-30 DIAGNOSIS — E78.5 DYSLIPIDEMIA: ICD-10-CM

## 2022-09-30 DIAGNOSIS — I10 ESSENTIAL HYPERTENSION: ICD-10-CM

## 2022-09-30 PROCEDURE — 93000 ELECTROCARDIOGRAM COMPLETE: CPT | Performed by: INTERNAL MEDICINE

## 2022-09-30 PROCEDURE — 99214 OFFICE O/P EST MOD 30 MIN: CPT | Performed by: INTERNAL MEDICINE

## 2023-02-20 ENCOUNTER — HOSPITAL ENCOUNTER (OUTPATIENT)
Dept: GENERAL RADIOLOGY | Facility: HOSPITAL | Age: 68
Discharge: HOME | End: 2023-02-20
Admitting: CLINIC/CENTER
Payer: MEDICARE

## 2023-02-20 ENCOUNTER — TRANSCRIBE ORDERS (OUTPATIENT)
Dept: ADMINISTRATIVE | Facility: HOSPITAL | Age: 68
End: 2023-02-20
Payer: MEDICARE

## 2023-02-20 DIAGNOSIS — R10.9 FLANK PAIN, ACUTE: Primary | ICD-10-CM

## 2023-02-20 PROCEDURE — 74018 RADEX ABDOMEN 1 VIEW: CPT

## 2023-05-29 ENCOUNTER — APPOINTMENT (OUTPATIENT)
Dept: CT IMAGING | Facility: HOSPITAL | Age: 68
End: 2023-05-29

## 2023-05-29 ENCOUNTER — APPOINTMENT (OUTPATIENT)
Dept: GENERAL RADIOLOGY | Facility: HOSPITAL | Age: 68
End: 2023-05-29

## 2023-05-29 ENCOUNTER — HOSPITAL ENCOUNTER (EMERGENCY)
Facility: HOSPITAL | Age: 68
Discharge: HOME | End: 2023-05-29
Attending: EMERGENCY MEDICINE | Admitting: EMERGENCY MEDICINE

## 2023-05-29 VITALS
RESPIRATION RATE: 18 BRPM | BODY MASS INDEX: 27.99 KG/M2 | HEIGHT: 65 IN | SYSTOLIC BLOOD PRESSURE: 157 MMHG | DIASTOLIC BLOOD PRESSURE: 94 MMHG | TEMPERATURE: 98.4 F | OXYGEN SATURATION: 98 % | WEIGHT: 168 LBS | HEART RATE: 70 BPM

## 2023-05-29 DIAGNOSIS — R53.1 GENERALIZED WEAKNESS: Primary | ICD-10-CM

## 2023-05-29 DIAGNOSIS — R73.9 HYPERGLYCEMIA: ICD-10-CM

## 2023-05-29 LAB
ALBUMIN SERPL-MCNC: 4.2 G/DL (ref 3.5–5.2)
ALBUMIN/GLOB SERPL: 1.8 G/DL
ALP SERPL-CCNC: 76 U/L (ref 39–117)
ALT SERPL W P-5'-P-CCNC: 13 U/L (ref 1–41)
ANION GAP SERPL CALCULATED.3IONS-SCNC: 13 MMOL/L (ref 5–15)
AST SERPL-CCNC: 16 U/L (ref 1–40)
B-OH-BUTYR SERPL-SCNC: 0.17 MMOL/L (ref 0.02–0.27)
BACTERIA UR QL AUTO: NORMAL /HPF
BASOPHILS # BLD AUTO: 0.04 10*3/MM3 (ref 0–0.2)
BASOPHILS NFR BLD AUTO: 0.8 % (ref 0–1.5)
BILIRUB SERPL-MCNC: 0.6 MG/DL (ref 0–1.2)
BILIRUB UR QL STRIP: NEGATIVE
BUN SERPL-MCNC: 9 MG/DL (ref 8–23)
BUN/CREAT SERPL: 8.6 (ref 7–25)
CALCIUM SPEC-SCNC: 9.6 MG/DL (ref 8.6–10.5)
CHLORIDE SERPL-SCNC: 108 MMOL/L (ref 98–107)
CLARITY UR: CLEAR
CO2 SERPL-SCNC: 21 MMOL/L (ref 22–29)
COLOR UR: YELLOW
CREAT SERPL-MCNC: 1.05 MG/DL (ref 0.76–1.27)
DEPRECATED RDW RBC AUTO: 38.4 FL (ref 37–54)
EGFRCR SERPLBLD CKD-EPI 2021: 78.3 ML/MIN/1.73
EOSINOPHIL # BLD AUTO: 0.19 10*3/MM3 (ref 0–0.4)
EOSINOPHIL NFR BLD AUTO: 3.7 % (ref 0.3–6.2)
ERYTHROCYTE [DISTWIDTH] IN BLOOD BY AUTOMATED COUNT: 12.8 % (ref 12.3–15.4)
GLOBULIN UR ELPH-MCNC: 2.4 GM/DL
GLUCOSE SERPL-MCNC: 220 MG/DL (ref 65–99)
GLUCOSE UR STRIP-MCNC: ABNORMAL MG/DL
HCT VFR BLD AUTO: 46.9 % (ref 37.5–51)
HGB BLD-MCNC: 15.1 G/DL (ref 13–17.7)
HGB UR QL STRIP.AUTO: NEGATIVE
HYALINE CASTS UR QL AUTO: NORMAL /LPF
IMM GRANULOCYTES # BLD AUTO: 0.01 10*3/MM3 (ref 0–0.05)
IMM GRANULOCYTES NFR BLD AUTO: 0.2 % (ref 0–0.5)
KETONES UR QL STRIP: ABNORMAL
LEUKOCYTE ESTERASE UR QL STRIP.AUTO: NEGATIVE
LYMPHOCYTES # BLD AUTO: 1.63 10*3/MM3 (ref 0.7–3.1)
LYMPHOCYTES NFR BLD AUTO: 31.7 % (ref 19.6–45.3)
MCH RBC QN AUTO: 27.1 PG (ref 26.6–33)
MCHC RBC AUTO-ENTMCNC: 32.2 G/DL (ref 31.5–35.7)
MCV RBC AUTO: 84.1 FL (ref 79–97)
MONOCYTES # BLD AUTO: 0.38 10*3/MM3 (ref 0.1–0.9)
MONOCYTES NFR BLD AUTO: 7.4 % (ref 5–12)
NEUTROPHILS NFR BLD AUTO: 2.89 10*3/MM3 (ref 1.7–7)
NEUTROPHILS NFR BLD AUTO: 56.2 % (ref 42.7–76)
NITRITE UR QL STRIP: NEGATIVE
NRBC BLD AUTO-RTO: 0 /100 WBC (ref 0–0.2)
NT-PROBNP SERPL-MCNC: 14.2 PG/ML (ref 0–900)
PH UR STRIP.AUTO: <=5 [PH] (ref 5–8)
PLATELET # BLD AUTO: 168 10*3/MM3 (ref 140–450)
PMV BLD AUTO: 11.4 FL (ref 6–12)
POTASSIUM SERPL-SCNC: 4.2 MMOL/L (ref 3.5–5.2)
PROT SERPL-MCNC: 6.6 G/DL (ref 6–8.5)
PROT UR QL STRIP: ABNORMAL
QT INTERVAL: 394 MS
QTC INTERVAL: 451 MS
RBC # BLD AUTO: 5.58 10*6/MM3 (ref 4.14–5.8)
RBC # UR STRIP: NORMAL /HPF
REF LAB TEST METHOD: NORMAL
SODIUM SERPL-SCNC: 142 MMOL/L (ref 136–145)
SP GR UR STRIP: 1.03 (ref 1–1.03)
SQUAMOUS #/AREA URNS HPF: NORMAL /HPF
TROPONIN T SERPL HS-MCNC: 7 NG/L
UROBILINOGEN UR QL STRIP: ABNORMAL
WBC # UR STRIP: NORMAL /HPF
WBC NRBC COR # BLD: 5.14 10*3/MM3 (ref 3.4–10.8)

## 2023-05-29 PROCEDURE — 83880 ASSAY OF NATRIURETIC PEPTIDE: CPT | Performed by: EMERGENCY MEDICINE

## 2023-05-29 PROCEDURE — 84484 ASSAY OF TROPONIN QUANT: CPT | Performed by: EMERGENCY MEDICINE

## 2023-05-29 PROCEDURE — 71045 X-RAY EXAM CHEST 1 VIEW: CPT

## 2023-05-29 PROCEDURE — 36415 COLL VENOUS BLD VENIPUNCTURE: CPT

## 2023-05-29 PROCEDURE — 82010 KETONE BODYS QUAN: CPT | Performed by: EMERGENCY MEDICINE

## 2023-05-29 PROCEDURE — 70450 CT HEAD/BRAIN W/O DYE: CPT

## 2023-05-29 PROCEDURE — 99284 EMERGENCY DEPT VISIT MOD MDM: CPT

## 2023-05-29 PROCEDURE — 93005 ELECTROCARDIOGRAM TRACING: CPT | Performed by: EMERGENCY MEDICINE

## 2023-05-29 PROCEDURE — 85025 COMPLETE CBC W/AUTO DIFF WBC: CPT | Performed by: EMERGENCY MEDICINE

## 2023-05-29 PROCEDURE — 80053 COMPREHEN METABOLIC PANEL: CPT | Performed by: EMERGENCY MEDICINE

## 2023-05-29 PROCEDURE — 81001 URINALYSIS AUTO W/SCOPE: CPT | Performed by: EMERGENCY MEDICINE

## 2023-05-29 RX ORDER — SODIUM CHLORIDE 0.9 % (FLUSH) 0.9 %
10 SYRINGE (ML) INJECTION AS NEEDED
Status: DISCONTINUED | OUTPATIENT
Start: 2023-05-29 | End: 2023-05-29 | Stop reason: HOSPADM

## 2023-05-29 RX ADMIN — SODIUM CHLORIDE 1000 ML: 9 INJECTION, SOLUTION INTRAVENOUS at 12:56

## 2023-05-29 NOTE — ED PROVIDER NOTES
[place]    EMERGENCY DEPARTMENT ENCOUNTER      Pt Name: Judah Cerna  MRN: [id number]  YOB: 1955  Date of evaluation: 5/29/2023  Provider: Zurdo Martins DO    CHIEF COMPLAINT       Chief Complaint   Patient presents with   • Weakness - Generalized         HISTORY OF PRESENT ILLNESS  (Location/Symptom, Timing/Onset, Context/Setting, Quality, Duration, Modifying Factors, Severity.)   Judah Cerna is a 67 y.o. male who presents to the emergency department via EMS for evaluation of an episode of weakness when he was on about a 5 mile walk earlier this morning.  The patient states took an extra route rather than going to his normal distance and went further and had an episode where he had to pause and felt a little off balance.  He called EMS himself secondary to the sensation and he notes when they arrived he returned back to his baseline, his blood sugar checked and was over 300.  Has a history of diabetes, states he did not take his medications this morning.  He denies any chest pain or palpitation associate this episode, no headache or vision changes, denies any unilateral weakness, numbness or tingling.  No recent illness, fever or chills, no chest pain or difficulty breathing, he does not have any abdominal pain, no urinary or bowel complaints.  He states currently feels that he is returned to his baseline, states he feels fine, does not have any acute complaints.        Nursing notes were reviewed.      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   • CARDIAC CATHETERIZATION     • CARDIAC CATHETERIZATION N/A 9/29/2018    Procedure: Left Heart Cath;  Surgeon: Nadir Tanner MD;  Location: Merged with [facility]  INVASIVE LOCATION;  Service: Cardiology   • CORONARY ARTERY BYPASS GRAFT N/A 10/2/2018    Procedure: MEDIAN STERNOTOMY,CORONARY ARTERY BYPASS WITH INTERNAL MAMMARY ARTERY GRAFT  X 5, EVH OF THE RIGHT GREATER SAPHENOUS VEIN;  Surgeon: Shane Bosch MD;  Location: [facility] OR;  Service: Cardiothoracic   • CORONARY STENT PLACEMENT      x 2         CURRENT MEDICATIONS       Current Facility-Administered Medications:   •  [COMPLETED] Insert Peripheral IV, , , Once **AND** sodium chloride 0.9 % flush 10 mL, 10 mL, Intravenous, PRN, Zurdo Martins, DO    Current Outpatient Medications:   •  ALPRAZolam (XANAX) 0.5 MG tablet, Take 1 tablet by mouth Daily As Needed., Disp: , Rfl:   •  aspirin 325 MG EC tablet, Take 1 tablet by mouth Daily., Disp: 30 tablet, Rfl: 1  •  glimepiride (AMARYL) 4 MG tablet, Take 1 tablet by mouth Every Morning Before Breakfast., Disp: , Rfl:   •  metFORMIN (GLUCOPHAGE) 1000 MG tablet, Take 1 tablet by mouth Daily With Breakfast., Disp: , Rfl:   •  nebivolol (BYSTOLIC) 5 MG tablet, Take 1 tablet by mouth Daily., Disp: , Rfl:   •  rosuvastatin (CRESTOR) 20 MG tablet, Take 1 tablet by mouth Daily., Disp: 90 tablet, Rfl: 3  •  tadalafil (CIALIS) 10 MG tablet, Take 1 tablet by mouth As Needed., Disp: , Rfl:   •  vitamin B-12 (CYANOCOBALAMIN) 500 MCG tablet, Take 1 tablet by mouth Daily. (Patient taking differently: Take 4 tablets by mouth Daily.), Disp: 30 tablet, Rfl: 0    ALLERGIES     Patient has no known allergies.    FAMILY HISTORY       Family History   Problem Relation Age of Onset   • No Known Problems Mother    • No Known Problems Father    • No Known Problems Brother    • No Known Problems Brother           SOCIAL HISTORY       Social History     Socioeconomic History   • Marital status:    • Number of children: 2   Tobacco Use   • Smoking status: Never   • Smokeless tobacco: Never   Vaping Use   • Vaping Use: Never used   Substance and Sexual Activity   • Alcohol use: No   •  "Drug use: No   • Sexual activity: Defer         PHYSICAL EXAM    (up to 7 for level 4, 8 or more for level 5)     Vitals:    05/29/23 1248 05/29/23 1250 05/29/23 1258 05/29/23 1400   BP: 132/88 132/88 116/80 157/94   Patient Position:  Lying     Pulse: 91 86 81 70   Resp:  18     Temp:  98.4 °F (36.9 °C)     TempSrc:  Oral     SpO2: 96% 96% 96% 98%   Weight:  76.2 kg (168 lb)     Height:  165.1 cm (65\")         Physical Exam  General : Patient is awake, alert, oriented, in no acute distress, nontoxic appearing, GCS 15  HEENT: Pupils are equally round, EOMI, conjunctivae clear, there is no injection no icterus.  Oral mucosa is moist, uvula midline  Neck: Neck is supple, full range of motion, trachea midline  Cardiac: Heart regular rate, rhythm, no murmurs, rubs, or gallops  Lungs: Lungs are clear to auscultation, there is no wheezing, rhonchi, or rales. There is no use of accessory muscles  Chest wall: There is no tenderness to palpation over the chest wall or over ribs  Abdomen: Abdomen is soft, nontender, nondistended. There are no firm or pulsatile masses, no rebound rigidity or guarding  Musculoskeletal: 5 out of 5 strength in all 4 extremities.  No focal muscle deficits are appreciated  Neuro: Motor intact, sensory intact, level of consciousness is normal, no focal neurological deficit examination, patient is ambulatory in the ED room during my examination.  No unilateral weakness, numbness or tingling, no focal neurological deficit on exam, no slurred speech  Dermatology: Skin is warm and dry  Psych: Mentation is grossly normal, cognition is grossly normal. Affect is appropriate      DIAGNOSTIC RESULTS     EKG:  All EKGs are interpreted by the Emergency Department Physician who either signs or Co-signs this chart in the absence of a cardiologist.    ECG 12 Lead Other; weakness   Final Result   Test Reason : WKNESS   Blood Pressure :   */*   mmHG   Vent. Rate :  79 BPM     Atrial Rate :  79 BPM      P-R Int : " 148 ms          QRS Dur :  92 ms       QT Int : 394 ms       P-R-T Axes :  80  22  35 degrees      QTc Int : 451 ms         Normal sinus rhythm   Possible Left atrial enlargement   Incomplete right bundle branch block   Nonspecific T wave abnormality   Abnormal ECG   When compared with ECG of 12-JAN-2021 05:14,   Incomplete right bundle branch block is now present   Minimal criteria for Inferior infarct are no longer present   Confirmed by TAMEKA DOUGLAS MD ([id number]) on 5/29/2023 1:00:24 PM      Referred By: EDMD           Confirmed By: TAMEKA DOUGLAS MD          RADIOLOGY:     [] Radiologist's Report Reviewed:  CT Head Without Contrast   Final Result   Impression:   No evidence of acute intracranial disease.            Electronically Signed: Yunier Elam     5/29/2023 2:58 PM EDT     Workstation ID: [id number]      XR Chest 1 View   Final Result   Impression:   No new chest disease.         Electronically Signed: Yunier Elam     5/29/2023 1:34 PM EDT     Workstation ID: [id number]          I ordered and independently reviewed the above noted radiographic studies.      I viewed images of chest x-ray which showed no acute cardiopulmonary process per my independent interpretation.    See radiologist's dictation for official interpretation.      ED BEDSIDE ULTRASOUND:   Performed by ED Physician - none    LABS:    I have reviewed and interpreted all of the currently available lab results from this visit (if applicable):  Results for orders placed or performed during the hospital encounter of 05/29/23   Comprehensive Metabolic Panel    Specimen: Blood   Result Value Ref Range    Glucose 220 (H) 65 - 99 mg/dL    BUN 9 8 - 23 mg/dL    Creatinine 1.05 0.76 - 1.27 mg/dL    Sodium 142 136 - 145 mmol/L    Potassium 4.2 3.5 - 5.2 mmol/L    Chloride 108 (H) 98 - 107 mmol/L    CO2 21.0 (L) 22.0 - 29.0 mmol/L    Calcium 9.6 8.6 - 10.5 mg/dL    Total Protein 6.6 6.0 - 8.5 g/dL    Albumin 4.2 3.5 - 5.2 g/dL    ALT (SGPT) 13 1 - 41 U/L    AST  (SGOT) 16 1 - 40 U/L    Alkaline Phosphatase 76 39 - 117 U/L    Total Bilirubin 0.6 0.0 - 1.2 mg/dL    Globulin 2.4 gm/dL    A/G Ratio 1.8 g/dL    BUN/Creatinine Ratio 8.6 7.0 - 25.0    Anion Gap 13.0 5.0 - 15.0 mmol/L    eGFR 78.3 >60.0 mL/min/1.73   Urinalysis With Microscopic If Indicated (No Culture) - Urine, Clean Catch    Specimen: Urine, Clean Catch   Result Value Ref Range    Color, UA Yellow Yellow, Straw    Appearance, UA Clear Clear    pH, UA <=5.0 5.0 - 8.0    Specific Gravity, UA 1.026 1.001 - 1.030    Glucose,  mg/dL (2+) (A) Negative    Ketones, UA Trace (A) Negative    Bilirubin, UA Negative Negative    Blood, UA Negative Negative    Protein,  mg/dL (2+) (A) Negative    Leuk Esterase, UA Negative Negative    Nitrite, UA Negative Negative    Urobilinogen, UA 0.2 E.U./dL 0.2 - 1.0 E.U./dL   BNP    Specimen: Blood   Result Value Ref Range    proBNP 14.2 0.0 - 900.0 pg/mL   Single High Sensitivity Troponin T    Specimen: Blood   Result Value Ref Range    HS Troponin T 7 <15 ng/L   CBC Auto Differential    Specimen: Blood   Result Value Ref Range    WBC 5.14 3.40 - 10.80 10*3/mm3    RBC 5.58 4.14 - 5.80 10*6/mm3    Hemoglobin 15.1 13.0 - 17.7 g/dL    Hematocrit 46.9 37.5 - 51.0 %    MCV 84.1 79.0 - 97.0 fL    MCH 27.1 26.6 - 33.0 pg    MCHC 32.2 31.5 - 35.7 g/dL    RDW 12.8 12.3 - 15.4 %    RDW-SD 38.4 37.0 - 54.0 fl    MPV 11.4 6.0 - 12.0 fL    Platelets 168 140 - 450 10*3/mm3    Neutrophil % 56.2 42.7 - 76.0 %    Lymphocyte % 31.7 19.6 - 45.3 %    Monocyte % 7.4 5.0 - 12.0 %    Eosinophil % 3.7 0.3 - 6.2 %    Basophil % 0.8 0.0 - 1.5 %    Immature Grans % 0.2 0.0 - 0.5 %    Neutrophils, Absolute 2.89 1.70 - 7.00 10*3/mm3    Lymphocytes, Absolute 1.63 0.70 - 3.10 10*3/mm3    Monocytes, Absolute 0.38 0.10 - 0.90 10*3/mm3    Eosinophils, Absolute 0.19 0.00 - 0.40 10*3/mm3    Basophils, Absolute 0.04 0.00 - 0.20 10*3/mm3    Immature Grans, Absolute 0.01 0.00 - 0.05 10*3/mm3    nRBC 0.0 0.0 - 0.2  /100 WBC   Beta Hydroxybutyrate Quantitative    Specimen: Blood   Result Value Ref Range    Beta-Hydroxybutyrate Quant 0.165 0.020 - 0.270 mmol/L   Urinalysis, Microscopic Only - Urine, Clean Catch    Specimen: Urine, Clean Catch   Result Value Ref Range    RBC, UA 0-2 None Seen, 0-2 /HPF    WBC, UA 0-2 None Seen, 0-2 /HPF    Bacteria, UA None Seen None Seen, Trace /HPF    Squamous Epithelial Cells, UA 0-2 None Seen, 0-2 /HPF    Hyaline Casts, UA 7-12 0 - 6 /LPF    Methodology Automated Microscopy    ECG 12 Lead Other; weakness   Result Value Ref Range    QT Interval 394 ms    QTC Interval 451 ms        If labs were ordered, I independently reviewed the results and considered them in treating the patient.      EMERGENCY DEPARTMENT COURSE and DIFFERENTIAL DIAGNOSIS/MDM:   Vitals:  AS OF 15:27 EDT    BP - 157/94  HR - 70  TEMP - 98.4 °F (36.9 °C) (Oral)  O2 SATS - 98%      Orders placed during this visit:  Orders Placed This Encounter   Procedures   • XR Chest 1 View   • CT Head Without Contrast   • Comprehensive Metabolic Panel   • Urinalysis With Microscopic If Indicated (No Culture) - Urine, Clean Catch   • BNP   • Single High Sensitivity Troponin T   • CBC Auto Differential   • Beta Hydroxybutyrate Quantitative   • Urinalysis, Microscopic Only - Urine, Clean Catch   • Pulse Oximetry, Continuous   • ECG 12 Lead Other; weakness   • Insert Peripheral IV   • CBC & Differential   • Ketone Bodies, Serum (Not performed at [facility])       All labs have been independently reviewed by me.  All radiology studies have been reviewed by me and the radiologist dictating the report.  All EKG's have been independently viewed and interpreted by me.      Discussion below represents my analysis of pertinent findings related to patient's condition, differential diagnosis, treatment plan and final disposition.    Differential diagnosis:  The differential diagnosis associated with the patient's presentation includes: Hyperglycemic  episode, dehydration, electrolyte abnormalities, DKA, TIA, arrhythmia    Additional sources  Discussed/ obtained information from independent historians:   [] Spouse  [] Parent  [] Family member  [] Friend  [x] EMS   [] Other:    External (non-ED) record review:   [] Inpatient record:   [] Office record:   [] Outpatient record:   [] Prior Outpatient labs:   [] Prior Outpatient radiology:   [] Primary Care record:   [] Outside ED record:   [] Other:     Patient's care impacted by:   [] Diabetes  [] Hypertension  [] Hyperlipidemia  [] Coronary Artery Disease   [] COPD   [] Cancer   [] Tobacco Abuse   [] Substance Abuse    [x] Other: Diabetes    Care significantly affected by Social Determinants of Health (housing and economic circumstances, unemployment)    [] Yes     [x] No   If yes, Patient's care significantly limited by Social Determinants of Health including:   [] Inadequate housing   [] Low income   [] Alcoholism and drug addiction in family   [] Problems related to primary support group   [] Unemployment   [] Problems related to employment   [] Other Social Determinants of Health:       MEDICATIONS ADMINISTERED IN ED:  Medications   sodium chloride 0.9 % flush 10 mL (has no administration in time range)   sodium chloride 0.9 % bolus 1,000 mL (0 mL Intravenous Stopped 5/29/23 1440)              This is a pleasant 67-year-old male who had a episode of very generalized weakness while he was on a 5 mile walk earlier this morning.  His symptoms resolved on EMS arrival, was found to be hyperglycemic at 370.  The patient is nontoxic-appearing during my examination, currently asymptomatic on arrival to the emergency department.  No focal unilateral weakness, numbness or tingling is appreciated, patient vital signs are stable, heart rate normal sinus rhythm at 84, pulse ox 96% with a blood pressure 132/88.  We did obtain IV, labs, imaging for further assessment, patient given IV fluid bolus.  Results as above.  Lab work  stable, no signs of acute infectious etiology, no signs of acute diabetic ketoacidosis.  Glucose of 220, kidney function liver function electrolytes are stable, no anion gap is present.  CT head is without acute intracranial abnormalities.  On reevaluation after IV fluids the patient is resting comfortably, nontoxic-appearing, states he feels normal, we discussed the importance of maintaining good fluid hydration, glucose control, following closely with his PCP in the next few days for reassessment, return to the ED with any worsening symptoms or further concerns.  He feels comfortable with this plan as discussed.    I had a discussion with the patient/family regarding diagnosis, diagnostic results, treatment plan, and medications.  The patient/family indicated understanding of these instructions.  I spent adequate time at the bedside preceding discharge necessary to personally discuss the aftercare instructions, giving patient education, providing explanations of the results of our evaluations/findings, and my decision making to assure that the patient/family understand the plan of care.  Time was allotted to answer questions at that time and throughout the ED course.  Emphasis was placed on timely follow-up after discharge.  I also discussed the potential for the development of an acute emergent condition requiring further evaluation, admission, or even surgical intervention. I discussed that we found nothing during the visit today indicating the need for further workup, admission, or the presence of an unstable medical condition.  I encouraged the patient to return to the emergency department immediately for ANY concerns, worsening, new complaints, or if symptoms persist and unable to seek follow-up in a timely fashion.  The patient/family expressed understanding and agreement with this plan.  The patient will follow-up with their PCP in 1-2 days for reevaluation.     PROCEDURES:  Procedures    CRITICAL CARE TIME     Total Critical Care time was 0 minutes, excluding separately reportable procedures.   There was a high probability of clinically significant/life threatening deterioration in the patient's condition which required my urgent intervention.      FINAL IMPRESSION      1. Generalized weakness    2. Hyperglycemia          DISPOSITION/PLAN     ED Disposition     ED Disposition   Discharge    Condition   Stable    Comment   --             PATIENT REFERRED TO:  Augusto Justin Lutzen, APRN  [address] [address]  [place] [address]  [phone]    In 2 days      [facility] Emergency Department  [address] [id number] [phone]    If symptoms worsen      DISCHARGE MEDICATIONS:     Medication List      CHANGE how you take these medications    vitamin B-12 500 MCG tablet  Commonly known as: CYANOCOBALAMIN  Take 1 tablet by mouth Daily.  What changed: how much to take        CONTINUE taking these medications    ALPRAZolam 0.5 MG tablet  Commonly known as: XANAX     aspirin 325 MG EC tablet  Take 1 tablet by mouth Daily.     glimepiride 4 MG tablet  Commonly known as: AMARYL     metFORMIN 1000 MG tablet  Commonly known as: GLUCOPHAGE     nebivolol 5 MG tablet  Commonly known as: BYSTOLIC     rosuvastatin 20 MG tablet  Commonly known as: CRESTOR  Take 1 tablet by mouth Daily.     tadalafil 10 MG tablet  Commonly known as: CIALIS                Comment: Please note this report has been produced using speech recognition software.      Zurdo Martins DO  Attending Emergency Physician       Zurdo Martins,   05/29/23 152

## 2023-10-06 ENCOUNTER — OFFICE VISIT (OUTPATIENT)
Dept: CARDIOLOGY | Facility: CLINIC | Age: 68
End: 2023-10-06
Payer: MEDICARE

## 2023-10-06 VITALS
SYSTOLIC BLOOD PRESSURE: 124 MMHG | HEIGHT: 66 IN | BODY MASS INDEX: 27.64 KG/M2 | HEART RATE: 76 BPM | DIASTOLIC BLOOD PRESSURE: 72 MMHG | WEIGHT: 172 LBS | OXYGEN SATURATION: 97 %

## 2023-10-06 DIAGNOSIS — I25.810 CORONARY ARTERY DISEASE INVOLVING CORONARY BYPASS GRAFT OF NATIVE HEART WITHOUT ANGINA PECTORIS: Primary | ICD-10-CM

## 2023-10-06 DIAGNOSIS — I10 ESSENTIAL HYPERTENSION: ICD-10-CM

## 2023-10-06 DIAGNOSIS — E78.5 DYSLIPIDEMIA: ICD-10-CM

## 2023-10-06 PROBLEM — N40.0 BENIGN FIBROMA OF PROSTATE: Status: ACTIVE | Noted: 2023-10-06

## 2023-10-06 PROBLEM — K21.9 ACID REFLUX: Status: ACTIVE | Noted: 2023-10-06

## 2023-10-06 PROBLEM — F41.9 ANXIETY DISORDER: Status: ACTIVE | Noted: 2023-10-06

## 2023-10-06 PROBLEM — D51.0 ADDISON ANEMIA: Status: ACTIVE | Noted: 2023-10-06

## 2023-10-06 RX ORDER — AMOXICILLIN 875 MG/1
875 TABLET, COATED ORAL 2 TIMES DAILY
COMMUNITY
Start: 2023-10-02

## 2023-10-06 RX ORDER — HYDROCODONE BITARTRATE AND ACETAMINOPHEN 5; 325 MG/1; MG/1
TABLET ORAL AS NEEDED
COMMUNITY
Start: 2023-10-02

## 2024-04-23 NOTE — PLAN OF CARE
Problem: Patient Care Overview  Goal: Plan of Care Review  Outcome: Ongoing (interventions implemented as appropriate)   10/05/18 0340   Coping/Psychosocial   Plan of Care Reviewed With patient;spouse   Plan of Care Review   Progress improving   OTHER   Outcome Summary Patient transferred from  ICU. Patient's vitals stable. Patient complains of mild pleuritic pain, no meds requested. MT site oozy. Will continue to monitor.        Problem: Cardiac Surgery (Adult)  Goal: Signs and Symptoms of Listed Potential Problems Will be Absent, Minimized or Managed (Cardiac Surgery)  Outcome: Ongoing (interventions implemented as appropriate)      Problem: Fall Risk (Adult)  Goal: Identify Related Risk Factors and Signs and Symptoms  Outcome: Ongoing (interventions implemented as appropriate)    Goal: Absence of Fall  Outcome: Ongoing (interventions implemented as appropriate)      Problem: Skin Injury Risk (Adult)  Goal: Identify Related Risk Factors and Signs and Symptoms  Outcome: Ongoing (interventions implemented as appropriate)    Goal: Skin Health and Integrity  Outcome: Ongoing (interventions implemented as appropriate)         none

## 2024-09-12 ENCOUNTER — APPOINTMENT (OUTPATIENT)
Dept: CT IMAGING | Facility: HOSPITAL | Age: 69
End: 2024-09-12
Payer: MEDICARE

## 2024-09-12 ENCOUNTER — HOSPITAL ENCOUNTER (EMERGENCY)
Facility: HOSPITAL | Age: 69
Discharge: HOME | End: 2024-09-13
Attending: EMERGENCY MEDICINE
Payer: MEDICARE

## 2024-09-12 DIAGNOSIS — Z86.79 HISTORY OF CORONARY ARTERY DISEASE: ICD-10-CM

## 2024-09-12 DIAGNOSIS — R07.9 CHEST PAIN IN ADULT: Primary | ICD-10-CM

## 2024-09-12 LAB
ALBUMIN SERPL-MCNC: 4.2 G/DL (ref 3.5–5.2)
ALBUMIN/GLOB SERPL: 1.9 G/DL
ALP SERPL-CCNC: 69 U/L (ref 39–117)
ALT SERPL W P-5'-P-CCNC: 29 U/L (ref 1–41)
ANION GAP SERPL CALCULATED.3IONS-SCNC: 12 MMOL/L (ref 5–15)
AST SERPL-CCNC: 32 U/L (ref 1–40)
BASOPHILS # BLD AUTO: 0.02 10*3/MM3 (ref 0–0.2)
BASOPHILS NFR BLD AUTO: 0.3 % (ref 0–1.5)
BILIRUB SERPL-MCNC: 0.5 MG/DL (ref 0–1.2)
BUN SERPL-MCNC: 10 MG/DL (ref 8–23)
BUN/CREAT SERPL: 8.8 (ref 7–25)
CALCIUM SPEC-SCNC: 9.5 MG/DL (ref 8.6–10.5)
CHLORIDE SERPL-SCNC: 103 MMOL/L (ref 98–107)
CO2 SERPL-SCNC: 25 MMOL/L (ref 22–29)
CREAT SERPL-MCNC: 1.13 MG/DL (ref 0.76–1.27)
DEPRECATED RDW RBC AUTO: 40 FL (ref 37–54)
EGFRCR SERPLBLD CKD-EPI 2021: 71.2 ML/MIN/1.73
EOSINOPHIL # BLD AUTO: 0.12 10*3/MM3 (ref 0–0.4)
EOSINOPHIL NFR BLD AUTO: 2 % (ref 0.3–6.2)
ERYTHROCYTE [DISTWIDTH] IN BLOOD BY AUTOMATED COUNT: 13 % (ref 12.3–15.4)
GLOBULIN UR ELPH-MCNC: 2.2 GM/DL
GLUCOSE SERPL-MCNC: 264 MG/DL (ref 65–99)
HCT VFR BLD AUTO: 44 % (ref 37.5–51)
HGB BLD-MCNC: 14.4 G/DL (ref 13–17.7)
HOLD SPECIMEN: NORMAL
IMM GRANULOCYTES # BLD AUTO: 0.02 10*3/MM3 (ref 0–0.05)
IMM GRANULOCYTES NFR BLD AUTO: 0.3 % (ref 0–0.5)
LIPASE SERPL-CCNC: 84 U/L (ref 13–60)
LYMPHOCYTES # BLD AUTO: 1.37 10*3/MM3 (ref 0.7–3.1)
LYMPHOCYTES NFR BLD AUTO: 22.5 % (ref 19.6–45.3)
MCH RBC QN AUTO: 27.7 PG (ref 26.6–33)
MCHC RBC AUTO-ENTMCNC: 32.7 G/DL (ref 31.5–35.7)
MCV RBC AUTO: 84.6 FL (ref 79–97)
MONOCYTES # BLD AUTO: 0.43 10*3/MM3 (ref 0.1–0.9)
MONOCYTES NFR BLD AUTO: 7.1 % (ref 5–12)
NEUTROPHILS NFR BLD AUTO: 4.12 10*3/MM3 (ref 1.7–7)
NEUTROPHILS NFR BLD AUTO: 67.8 % (ref 42.7–76)
NRBC BLD AUTO-RTO: 0 /100 WBC (ref 0–0.2)
NT-PROBNP SERPL-MCNC: 50.2 PG/ML (ref 0–900)
PLATELET # BLD AUTO: 151 10*3/MM3 (ref 140–450)
PMV BLD AUTO: 11.6 FL (ref 6–12)
POTASSIUM SERPL-SCNC: 4.4 MMOL/L (ref 3.5–5.2)
PROT SERPL-MCNC: 6.4 G/DL (ref 6–8.5)
QT INTERVAL: 364 MS
QTC INTERVAL: 406 MS
RBC # BLD AUTO: 5.2 10*6/MM3 (ref 4.14–5.8)
SODIUM SERPL-SCNC: 140 MMOL/L (ref 136–145)
TROPONIN T SERPL HS-MCNC: 10 NG/L
WBC NRBC COR # BLD AUTO: 6.08 10*3/MM3 (ref 3.4–10.8)
WHOLE BLOOD HOLD COAG: NORMAL
WHOLE BLOOD HOLD SPECIMEN: NORMAL

## 2024-09-12 PROCEDURE — 84484 ASSAY OF TROPONIN QUANT: CPT | Performed by: EMERGENCY MEDICINE

## 2024-09-12 PROCEDURE — 25510000001 IOPAMIDOL PER 1 ML: Performed by: EMERGENCY MEDICINE

## 2024-09-12 PROCEDURE — 71275 CT ANGIOGRAPHY CHEST: CPT

## 2024-09-12 PROCEDURE — 83880 ASSAY OF NATRIURETIC PEPTIDE: CPT | Performed by: EMERGENCY MEDICINE

## 2024-09-12 PROCEDURE — 83690 ASSAY OF LIPASE: CPT | Performed by: EMERGENCY MEDICINE

## 2024-09-12 PROCEDURE — 80053 COMPREHEN METABOLIC PANEL: CPT | Performed by: EMERGENCY MEDICINE

## 2024-09-12 PROCEDURE — 93005 ELECTROCARDIOGRAM TRACING: CPT

## 2024-09-12 PROCEDURE — 99285 EMERGENCY DEPT VISIT HI MDM: CPT

## 2024-09-12 PROCEDURE — 93005 ELECTROCARDIOGRAM TRACING: CPT | Performed by: EMERGENCY MEDICINE

## 2024-09-12 PROCEDURE — 85025 COMPLETE CBC W/AUTO DIFF WBC: CPT | Performed by: EMERGENCY MEDICINE

## 2024-09-12 RX ORDER — SODIUM CHLORIDE 0.9 % (FLUSH) 0.9 %
10 SYRINGE (ML) INJECTION AS NEEDED
Status: DISCONTINUED | OUTPATIENT
Start: 2024-09-12 | End: 2024-09-13 | Stop reason: HOSPADM

## 2024-09-12 RX ORDER — ASPIRIN 81 MG/1
324 TABLET, CHEWABLE ORAL ONCE
Status: DISCONTINUED | OUTPATIENT
Start: 2024-09-12 | End: 2024-09-13 | Stop reason: HOSPADM

## 2024-09-12 RX ORDER — IOPAMIDOL 755 MG/ML
100 INJECTION, SOLUTION INTRAVASCULAR
Status: COMPLETED | OUTPATIENT
Start: 2024-09-12 | End: 2024-09-12

## 2024-09-12 RX ADMIN — IOPAMIDOL 75 ML: 755 INJECTION, SOLUTION INTRAVENOUS at 22:25

## 2024-09-13 ENCOUNTER — TELEPHONE (OUTPATIENT)
Dept: CARDIOLOGY | Facility: CLINIC | Age: 69
End: 2024-09-13
Payer: MEDICARE

## 2024-09-13 VITALS
OXYGEN SATURATION: 97 % | TEMPERATURE: 98.1 F | SYSTOLIC BLOOD PRESSURE: 124 MMHG | DIASTOLIC BLOOD PRESSURE: 69 MMHG | WEIGHT: 162 LBS | RESPIRATION RATE: 16 BRPM | BODY MASS INDEX: 26.99 KG/M2 | HEIGHT: 65 IN | HEART RATE: 67 BPM

## 2024-09-13 LAB
GEN 5 2HR TROPONIN T REFLEX: 12 NG/L
QT INTERVAL: 390 MS
QTC INTERVAL: 405 MS
TROPONIN T DELTA: 2 NG/L

## 2024-09-13 PROCEDURE — 84484 ASSAY OF TROPONIN QUANT: CPT | Performed by: EMERGENCY MEDICINE

## 2024-09-13 PROCEDURE — 36415 COLL VENOUS BLD VENIPUNCTURE: CPT

## 2024-09-13 NOTE — DISCHARGE INSTRUCTIONS
A clear cause for your symptoms were not identified in the emergency room tonight.  You should call your cardiologist tomorrow morning to schedule a close follow-up appointment.  Return to the ER as needed for any new or worsening symptoms

## 2024-09-13 NOTE — TELEPHONE ENCOUNTER
Caller: Judah Cerna    Relationship to patient: Self    Best call back number: [phone]    New or established patient?  [] New  [x] Established    Date of discharge: 9/12/24    Facility discharged from: [facility]    Diagnosis/Symptoms: CHEST PAIN    Length of stay (If applicable): ER VISIT    Specialty Only: Did you see a [facility] provider?    [] Yes  [x] No  If so, who?       PT IS TO FOLLOW UP WITH DR. CLARKE. NO TIME FRAME OR DIRECTION TO SCHEDULE. PLEASE CALL BACK TO ADVISE, THANK YOU.

## 2024-09-13 NOTE — ED PROVIDER NOTES
Subjective   History of Present Illness  Patient presents for evaluation of an abnormal feeling on the left side of his chest and his left neck feeling as if it is numb.  Patient states he noticed the symptoms as he was out for his evening walk tonight.  He states that he had 1 brief episode of sharp pain in the left chest that lasted for just a couple of seconds and then resolved.  No ongoing chest pain.  No shortness of breath, no nausea, vomiting, fevers, chills.  No weakness.  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    CARDIAC CATHETERIZATION      CARDIAC CATHETERIZATION N/A 9/29/2018    Procedure: Left Heart Cath;  Surgeon: Nadir Tanner MD;  Location:  [facility];  Service: Cardiology    CORONARY ARTERY BYPASS GRAFT N/A 10/2/2018    Procedure: MEDIAN STERNOTOMY,CORONARY ARTERY BYPASS WITH INTERNAL MAMMARY ARTERY GRAFT  X 5, EVH OF THE RIGHT GREATER SAPHENOUS VEIN;  Surgeon: Shane Bosch MD;  Location:  [facility] OR;  Service: Cardiothoracic    CORONARY STENT PLACEMENT      x 2       Family History   Problem Relation Age of Onset    No Known Problems Mother     No Known Problems Father     No Known Problems Brother     No Known Problems Brother        Social History     Socioeconomic History    Marital status:     Number of children: 2   Tobacco Use    Smoking status: Never    Smokeless tobacco: Never   Vaping Use    Vaping status: Never Used   Substance and Sexual Activity    Alcohol use: No    Drug use: No    Sexual activity: Defer           Objective   Physical Exam  Constitutional:       General: He is not in acute distress.  HENT:      Head:  Normocephalic and atraumatic.   Eyes:      Conjunctiva/sclera: Conjunctivae normal.      Pupils: Pupils are equal, round, and reactive to light.   Cardiovascular:      Rate and Rhythm: Normal rate and regular rhythm.      Pulses: Normal pulses.      Heart sounds: No murmur heard.     No gallop.   Pulmonary:      Effort: Pulmonary effort is normal. No respiratory distress.   Abdominal:      General: Abdomen is flat. There is no distension.      Tenderness: There is no abdominal tenderness.   Musculoskeletal:         General: No swelling or deformity. Normal range of motion.   Skin:     General: Skin is warm and dry.      Capillary Refill: Capillary refill takes less than 2 seconds.   Neurological:      General: No focal deficit present.      Mental Status: He is alert and oriented to person, place, and time.      Comments: GCS 15.  Cranial Nerves II-XII intact without deficit.  Strength 5/5 in the bilateral upper extremities.  Strength 5/5 in the bilateral lower extremities.  Sensation to light touch intact throughout.  Cerebellar function intact via finger-nose-finger.    In specific regards to the left chest wall and left neck patient states that sensation to light touch is similar when comparing the left and the right side   Psychiatric:         Mood and Affect: Mood normal.         Behavior: Behavior normal.         Procedures           ED Course  ED Course as of 09/13/24 0239   Thu Sep 12, 2024   2115 Twelve-lead ECG independently interpreted by myself demonstrates normal sinus rhythm, no ST segment elevation or depression.  Normal axis [KB]   2253 CTA of the chest independently interpreted by myself demonstrates no acute abnormality [KB]   Fri Sep 13, 2024   0152 Laboratory workup independently interpreted by myself demonstrates no substantial abnormalities, serial troponins within normal limits.  Lipase mildly elevated but patient has no epigastric tenderness I do not believe this is a explanation for his  symptoms tonight. [KB]      ED Course User Index  [KB] Christopher Raymundo MD                HEART Score: 5                              Medical Decision Making  Differential diagnosis includes acute coronary syndrome, pneumonia, pneumothorax, vascular occlusion or dissection.  Lab and imaging studies were conducted.    Patient reevaluated, remained symptom-free in the emergency department.  He has an elevated heart score.  We discussed his lab and imaging findings, he is able to achieve close follow-up with his cardiologist, he feels comfortable going home at this time.  He was counseled on return precautions to the ER and was discharged in good condition    Problems Addressed:  Chest pain in adult: complicated acute illness or injury    Amount and/or Complexity of Data Reviewed  External Data Reviewed: notes.     Details: 5/29/2023 reviewed most recent ER visit when patient was treated for generalized weakness and hyperglycemia  Labs: ordered. Decision-making details documented in ED Course.  Radiology: ordered and independent interpretation performed. Decision-making details documented in ED Course.  ECG/medicine tests: ordered and independent interpretation performed. Decision-making details documented in ED Course.    Risk  OTC drugs.  Prescription drug management.        Final diagnoses:   Chest pain in adult   History of coronary artery disease       ED Disposition  ED Disposition       ED Disposition   Discharge    Condition   Stable    Comment   --             Recent Results (from the past 24 hour(s))   ECG 12 Lead ED Triage Standing Order; Chest Pain    Collection Time: 09/12/24  9:08 PM   Result Value Ref Range    QT Interval 364 ms    QTC Interval 406 ms   Comprehensive Metabolic Panel    Collection Time: 09/12/24  9:40 PM    Specimen: Arm, Right; Blood   Result Value Ref Range    Glucose 264 (H) 65 - 99 mg/dL    BUN 10 8 - 23 mg/dL    Creatinine 1.13 0.76 - 1.27 mg/dL    Sodium 140 136 - 145 mmol/L     Potassium 4.4 3.5 - 5.2 mmol/L    Chloride 103 98 - 107 mmol/L    CO2 25.0 22.0 - 29.0 mmol/L    Calcium 9.5 8.6 - 10.5 mg/dL    Total Protein 6.4 6.0 - 8.5 g/dL    Albumin 4.2 3.5 - 5.2 g/dL    ALT (SGPT) 29 1 - 41 U/L    AST (SGOT) 32 1 - 40 U/L    Alkaline Phosphatase 69 39 - 117 U/L    Total Bilirubin 0.5 0.0 - 1.2 mg/dL    Globulin 2.2 gm/dL    A/G Ratio 1.9 g/dL    BUN/Creatinine Ratio 8.8 7.0 - 25.0    Anion Gap 12.0 5.0 - 15.0 mmol/L    eGFR 71.2 >60.0 mL/min/1.73   Lipase    Collection Time: 09/12/24  9:40 PM    Specimen: Arm, Right; Blood   Result Value Ref Range    Lipase 84 (H) 13 - 60 U/L   BNP    Collection Time: 09/12/24  9:40 PM    Specimen: Arm, Right; Blood   Result Value Ref Range    proBNP 50.2 0.0 - 900.0 pg/mL   Green Top (Gel)    Collection Time: 09/12/24  9:40 PM   Result Value Ref Range    Extra Tube Hold for add-ons.    Lavender Top    Collection Time: 09/12/24  9:40 PM   Result Value Ref Range    Extra Tube hold for add-on    Gold Top - SST    Collection Time: 09/12/24  9:40 PM   Result Value Ref Range    Extra Tube Hold for add-ons.    Gray Top    Collection Time: 09/12/24  9:40 PM   Result Value Ref Range    Extra Tube Hold for add-ons.    Light Blue Top    Collection Time: 09/12/24  9:40 PM   Result Value Ref Range    Extra Tube Hold for add-ons.    CBC Auto Differential    Collection Time: 09/12/24  9:40 PM    Specimen: Arm, Right; Blood   Result Value Ref Range    WBC 6.08 3.40 - 10.80 10*3/mm3    RBC 5.20 4.14 - 5.80 10*6/mm3    Hemoglobin 14.4 13.0 - 17.7 g/dL    Hematocrit 44.0 37.5 - 51.0 %    MCV 84.6 79.0 - 97.0 fL    MCH 27.7 26.6 - 33.0 pg    MCHC 32.7 31.5 - 35.7 g/dL    RDW 13.0 12.3 - 15.4 %    RDW-SD 40.0 37.0 - 54.0 fl    MPV 11.6 6.0 - 12.0 fL    Platelets 151 140 - 450 10*3/mm3    Neutrophil % 67.8 42.7 - 76.0 %    Lymphocyte % 22.5 19.6 - 45.3 %    Monocyte % 7.1 5.0 - 12.0 %    Eosinophil % 2.0 0.3 - 6.2 %    Basophil % 0.3 0.0 - 1.5 %    Immature Grans % 0.3 0.0 -  0.5 %    Neutrophils, Absolute 4.12 1.70 - 7.00 10*3/mm3    Lymphocytes, Absolute 1.37 0.70 - 3.10 10*3/mm3    Monocytes, Absolute 0.43 0.10 - 0.90 10*3/mm3    Eosinophils, Absolute 0.12 0.00 - 0.40 10*3/mm3    Basophils, Absolute 0.02 0.00 - 0.20 10*3/mm3    Immature Grans, Absolute 0.02 0.00 - 0.05 10*3/mm3    nRBC 0.0 0.0 - 0.2 /100 WBC   High Sensitivity Troponin T    Collection Time: 09/12/24 10:36 PM    Specimen: Arm, Right; Blood   Result Value Ref Range    HS Troponin T 10 <22 ng/L   ECG 12 Lead ED Triage Standing Order; Chest Pain    Collection Time: 09/12/24 11:30 PM   Result Value Ref Range    QT Interval 390 ms    QTC Interval 405 ms   High Sensitivity Troponin T 2Hr    Collection Time: 09/13/24 12:50 AM    Specimen: Blood   Result Value Ref Range    HS Troponin T 12 <22 ng/L    Troponin T Delta 2 >=-4 - <+4 ng/L     Note: In addition to lab results from this visit, the labs listed above may include labs taken at another facility or during a different encounter within the last 24 hours. Please correlate lab times with ED admission and discharge times for further clarification of the services performed during this visit.    CT Angiogram Chest   Final Result   Impression:   No evidence of pulmonary embolism. No acute intrathoracic abnormality.            Electronically Signed: Harley Rain DO     9/12/2024 10:36 PM EDT     Workstation ID: [id number]        Vitals:    09/13/24 0103 09/13/24 0108 09/13/24 0113 09/13/24 0118   BP:       BP Location:       Patient Position:       Pulse: 63 62 69 67   Resp:       Temp:       TempSrc:       SpO2: 97% 97% 98% 97%   Weight:       Height:         Medications   sodium chloride 0.9 % flush 10 mL (has no administration in time range)   aspirin chewable tablet 324 mg (324 mg Oral Not Given 9/12/24 2136)   iopamidol (ISOVUE-370) 76 % injection 100 mL (75 mL Intravenous Given 9/12/24 0577)     ECG/EMG Results (last 24 hours)       Procedure Component Value Units  Date/Time    ECG 12 Lead ED Triage Standing Order; Chest Pain [[id number]] Collected: 09/12/24 2108     Updated: 09/12/24 2116     QT Interval 364 ms      QTC Interval 406 ms     Narrative:      Test Reason : ED Triage Standing Order~  Blood Pressure :   */*   mmHG  Vent. Rate :  75 BPM     Atrial Rate :  75 BPM     P-R Int : 162 ms          QRS Dur :  80 ms      QT Int : 364 ms       P-R-T Axes :  81  37  54 degrees     QTc Int : 406 ms    Normal sinus rhythm  Normal ECG  When compared with ECG of 29-MAY-2023 12:57,  Incomplete right bundle branch block is no longer present  Confirmed by MD FLOR, ZANDRA ([id number]) on 9/12/2024 9:16:17 PM    Referred By: EDMD           Confirmed By: ZANDRA RAY MD    ECG 12 Lead ED Triage Standing Order; Chest Pain [[id number]] Collected: 09/12/24 2330     Updated: 09/12/24 2331     QT Interval 390 ms      QTC Interval 405 ms     Narrative:      Test Reason : ED  Blood Pressure :   */*   mmHG  Vent. Rate :  65 BPM     Atrial Rate :  65 BPM     P-R Int : 168 ms          QRS Dur :  82 ms      QT Int : 390 ms       P-R-T Axes :  79  34  57 degrees     QTc Int : 405 ms    Normal sinus rhythm  Possible Left atrial enlargement  Nonspecific T wave abnormality  Abnormal ECG  When compared with ECG of 12-SEP-2024 21:08,  Nonspecific T wave abnormality now evident in Lateral leads    Referred By: ED MD           Confirmed By:           ECG 12 Lead ED Triage Standing Order; Chest Pain   Preliminary Result   Test Reason : ED   Blood Pressure :   */*   mmHG   Vent. Rate :  65 BPM     Atrial Rate :  65 BPM      P-R Int : 168 ms          QRS Dur :  82 ms       QT Int : 390 ms       P-R-T Axes :  79  34  57 degrees      QTc Int : 405 ms      Normal sinus rhythm   Possible Left atrial enlargement   Nonspecific T wave abnormality   Abnormal ECG   When compared with ECG of 12-SEP-2024 21:08,   Nonspecific T wave abnormality now evident in Lateral leads      Referred By: ED MD           Confirmed By:        ECG 12 Lead ED Triage Standing Order; Chest Pain   Final Result   Test Reason : ED Triage Standing Order~   Blood Pressure :   */*   mmHG   Vent. Rate :  75 BPM     Atrial Rate :  75 BPM      P-R Int : 162 ms          QRS Dur :  80 ms       QT Int : 364 ms       P-R-T Axes :  81  37  54 degrees      QTc Int : 406 ms      Normal sinus rhythm   Normal ECG   When compared with ECG of 29-MAY-2023 12:57,   Incomplete right bundle branch block is no longer present   Confirmed by MD RAY KYLE ([id number]) on 9/12/2024 9:16:17 PM      Referred By: EDMD           Confirmed By: ZANDRA RAY MD              No follow-up provider specified.       Medication List        Changed      vitamin B-12 500 MCG tablet  Commonly known as: CYANOCOBALAMIN  Take 1 tablet by mouth Daily.  What changed: how much to take                 Zandra Ray MD  09/13/24 1196

## 2024-09-14 ENCOUNTER — HOSPITAL ENCOUNTER (OUTPATIENT)
Facility: HOSPITAL | Age: 69
Setting detail: OBSERVATION
Discharge: HOME | End: 2024-09-16
Attending: EMERGENCY MEDICINE | Admitting: STUDENT IN AN ORGANIZED HEALTH CARE EDUCATION/TRAINING PROGRAM
Payer: MEDICARE

## 2024-09-14 DIAGNOSIS — Z86.79 HISTORY OF CORONARY ARTERY DISEASE: ICD-10-CM

## 2024-09-14 DIAGNOSIS — R07.9 CHEST PAIN, UNSPECIFIED TYPE: Primary | ICD-10-CM

## 2024-09-14 LAB
ALBUMIN SERPL-MCNC: 4.3 G/DL (ref 3.5–5.2)
ALBUMIN/GLOB SERPL: 1.7 G/DL
ALP SERPL-CCNC: 71 U/L (ref 39–117)
ALT SERPL W P-5'-P-CCNC: 24 U/L (ref 1–41)
ANION GAP SERPL CALCULATED.3IONS-SCNC: 14 MMOL/L (ref 5–15)
AST SERPL-CCNC: 28 U/L (ref 1–40)
BASOPHILS # BLD AUTO: 0.03 10*3/MM3 (ref 0–0.2)
BASOPHILS NFR BLD AUTO: 0.4 % (ref 0–1.5)
BILIRUB SERPL-MCNC: 0.5 MG/DL (ref 0–1.2)
BUN SERPL-MCNC: 9 MG/DL (ref 8–23)
BUN/CREAT SERPL: 9 (ref 7–25)
CALCIUM SPEC-SCNC: 9.2 MG/DL (ref 8.6–10.5)
CHLORIDE SERPL-SCNC: 103 MMOL/L (ref 98–107)
CO2 SERPL-SCNC: 21 MMOL/L (ref 22–29)
CREAT SERPL-MCNC: 1 MG/DL (ref 0.76–1.27)
DEPRECATED RDW RBC AUTO: 39 FL (ref 37–54)
EGFRCR SERPLBLD CKD-EPI 2021: 82.5 ML/MIN/1.73
EOSINOPHIL # BLD AUTO: 0.24 10*3/MM3 (ref 0–0.4)
EOSINOPHIL NFR BLD AUTO: 3.4 % (ref 0.3–6.2)
ERYTHROCYTE [DISTWIDTH] IN BLOOD BY AUTOMATED COUNT: 12.7 % (ref 12.3–15.4)
GEN 5 2HR TROPONIN T REFLEX: 8 NG/L
GLOBULIN UR ELPH-MCNC: 2.5 GM/DL
GLUCOSE SERPL-MCNC: 250 MG/DL (ref 65–99)
HCT VFR BLD AUTO: 47.1 % (ref 37.5–51)
HGB BLD-MCNC: 15.6 G/DL (ref 13–17.7)
HOLD SPECIMEN: NORMAL
IMM GRANULOCYTES # BLD AUTO: 0.01 10*3/MM3 (ref 0–0.05)
IMM GRANULOCYTES NFR BLD AUTO: 0.1 % (ref 0–0.5)
LIPASE SERPL-CCNC: 63 U/L (ref 13–60)
LYMPHOCYTES # BLD AUTO: 2.15 10*3/MM3 (ref 0.7–3.1)
LYMPHOCYTES NFR BLD AUTO: 30.8 % (ref 19.6–45.3)
MCH RBC QN AUTO: 27.8 PG (ref 26.6–33)
MCHC RBC AUTO-ENTMCNC: 33.1 G/DL (ref 31.5–35.7)
MCV RBC AUTO: 83.8 FL (ref 79–97)
MONOCYTES # BLD AUTO: 0.58 10*3/MM3 (ref 0.1–0.9)
MONOCYTES NFR BLD AUTO: 8.3 % (ref 5–12)
NEUTROPHILS NFR BLD AUTO: 3.98 10*3/MM3 (ref 1.7–7)
NEUTROPHILS NFR BLD AUTO: 57 % (ref 42.7–76)
NRBC BLD AUTO-RTO: 0 /100 WBC (ref 0–0.2)
PLATELET # BLD AUTO: 179 10*3/MM3 (ref 140–450)
PMV BLD AUTO: 11.3 FL (ref 6–12)
POTASSIUM SERPL-SCNC: 4 MMOL/L (ref 3.5–5.2)
PROT SERPL-MCNC: 6.8 G/DL (ref 6–8.5)
RBC # BLD AUTO: 5.62 10*6/MM3 (ref 4.14–5.8)
SODIUM SERPL-SCNC: 138 MMOL/L (ref 136–145)
TROPONIN T DELTA: 0 NG/L
TROPONIN T SERPL HS-MCNC: 8 NG/L
WBC NRBC COR # BLD AUTO: 6.99 10*3/MM3 (ref 3.4–10.8)
WHOLE BLOOD HOLD COAG: NORMAL
WHOLE BLOOD HOLD SPECIMEN: NORMAL

## 2024-09-14 PROCEDURE — 80053 COMPREHEN METABOLIC PANEL: CPT | Performed by: NURSE PRACTITIONER

## 2024-09-14 PROCEDURE — 80053 COMPREHEN METABOLIC PANEL: CPT | Performed by: EMERGENCY MEDICINE

## 2024-09-14 PROCEDURE — 99223 1ST HOSP IP/OBS HIGH 75: CPT | Performed by: NURSE PRACTITIONER

## 2024-09-14 PROCEDURE — 85025 COMPLETE CBC W/AUTO DIFF WBC: CPT | Performed by: EMERGENCY MEDICINE

## 2024-09-14 PROCEDURE — 93005 ELECTROCARDIOGRAM TRACING: CPT

## 2024-09-14 PROCEDURE — 99285 EMERGENCY DEPT VISIT HI MDM: CPT

## 2024-09-14 PROCEDURE — 25010000002 ENOXAPARIN PER 10 MG: Performed by: EMERGENCY MEDICINE

## 2024-09-14 PROCEDURE — 96372 THER/PROPH/DIAG INJ SC/IM: CPT

## 2024-09-14 PROCEDURE — 83690 ASSAY OF LIPASE: CPT | Performed by: EMERGENCY MEDICINE

## 2024-09-14 PROCEDURE — 93005 ELECTROCARDIOGRAM TRACING: CPT | Performed by: EMERGENCY MEDICINE

## 2024-09-14 PROCEDURE — 84484 ASSAY OF TROPONIN QUANT: CPT | Performed by: EMERGENCY MEDICINE

## 2024-09-14 PROCEDURE — 36415 COLL VENOUS BLD VENIPUNCTURE: CPT

## 2024-09-14 PROCEDURE — G0378 HOSPITAL OBSERVATION PER HR: HCPCS

## 2024-09-14 RX ORDER — SODIUM CHLORIDE 0.9 % (FLUSH) 0.9 %
10 SYRINGE (ML) INJECTION AS NEEDED
Status: DISCONTINUED | OUTPATIENT
Start: 2024-09-14 | End: 2024-09-16 | Stop reason: HOSPADM

## 2024-09-14 RX ORDER — ASPIRIN 81 MG/1
324 TABLET, CHEWABLE ORAL ONCE
Status: COMPLETED | OUTPATIENT
Start: 2024-09-14 | End: 2024-09-14

## 2024-09-14 RX ORDER — ENOXAPARIN SODIUM 100 MG/ML
1 INJECTION SUBCUTANEOUS ONCE
Status: COMPLETED | OUTPATIENT
Start: 2024-09-14 | End: 2024-09-14

## 2024-09-14 RX ORDER — ENOXAPARIN SODIUM 100 MG/ML
1 INJECTION SUBCUTANEOUS ONCE
Status: DISCONTINUED | OUTPATIENT
Start: 2024-09-14 | End: 2024-09-14

## 2024-09-14 RX ADMIN — ASPIRIN 81 MG 324 MG: 81 TABLET ORAL at 22:29

## 2024-09-14 RX ADMIN — ENOXAPARIN SODIUM 70 MG: 80 INJECTION SUBCUTANEOUS at 23:43

## 2024-09-14 RX ADMIN — NITROGLYCERIN 1 INCH: 20 OINTMENT TOPICAL at 22:28

## 2024-09-15 LAB
ALBUMIN SERPL-MCNC: 4.2 G/DL (ref 3.5–5.2)
ALBUMIN/GLOB SERPL: 1.8 G/DL
ALP SERPL-CCNC: 70 U/L (ref 39–117)
ALT SERPL W P-5'-P-CCNC: 23 U/L (ref 1–41)
ANION GAP SERPL CALCULATED.3IONS-SCNC: 11 MMOL/L (ref 5–15)
ANION GAP SERPL CALCULATED.3IONS-SCNC: 16 MMOL/L (ref 5–15)
AST SERPL-CCNC: 25 U/L (ref 1–40)
BASOPHILS # BLD AUTO: 0.04 10*3/MM3 (ref 0–0.2)
BASOPHILS NFR BLD AUTO: 0.7 % (ref 0–1.5)
BILIRUB SERPL-MCNC: 0.5 MG/DL (ref 0–1.2)
BUN SERPL-MCNC: 10 MG/DL (ref 8–23)
BUN SERPL-MCNC: 9 MG/DL (ref 8–23)
BUN/CREAT SERPL: 11.1 (ref 7–25)
BUN/CREAT SERPL: 9.3 (ref 7–25)
CALCIUM SPEC-SCNC: 8.9 MG/DL (ref 8.6–10.5)
CALCIUM SPEC-SCNC: 9.2 MG/DL (ref 8.6–10.5)
CHLORIDE SERPL-SCNC: 103 MMOL/L (ref 98–107)
CHLORIDE SERPL-SCNC: 106 MMOL/L (ref 98–107)
CHOLEST SERPL-MCNC: 111 MG/DL (ref 0–200)
CO2 SERPL-SCNC: 18 MMOL/L (ref 22–29)
CO2 SERPL-SCNC: 23 MMOL/L (ref 22–29)
CREAT SERPL-MCNC: 0.9 MG/DL (ref 0.76–1.27)
CREAT SERPL-MCNC: 0.97 MG/DL (ref 0.76–1.27)
CRP SERPL-MCNC: <0.3 MG/DL (ref 0–0.5)
DEPRECATED RDW RBC AUTO: 39 FL (ref 37–54)
DEPRECATED RDW RBC AUTO: 39.5 FL (ref 37–54)
EGFRCR SERPLBLD CKD-EPI 2021: 85.6 ML/MIN/1.73
EGFRCR SERPLBLD CKD-EPI 2021: 93.6 ML/MIN/1.73
EOSINOPHIL # BLD AUTO: 0.19 10*3/MM3 (ref 0–0.4)
EOSINOPHIL NFR BLD AUTO: 3.1 % (ref 0.3–6.2)
ERYTHROCYTE [DISTWIDTH] IN BLOOD BY AUTOMATED COUNT: 12.9 % (ref 12.3–15.4)
ERYTHROCYTE [DISTWIDTH] IN BLOOD BY AUTOMATED COUNT: 12.9 % (ref 12.3–15.4)
ERYTHROCYTE [SEDIMENTATION RATE] IN BLOOD: 4 MM/HR (ref 0–20)
GLOBULIN UR ELPH-MCNC: 2.4 GM/DL
GLUCOSE BLDC GLUCOMTR-MCNC: 176 MG/DL (ref 70–130)
GLUCOSE SERPL-MCNC: 141 MG/DL (ref 65–99)
GLUCOSE SERPL-MCNC: 258 MG/DL (ref 65–99)
HBA1C MFR BLD: 8.3 % (ref 4.8–5.6)
HCT VFR BLD AUTO: 42.8 % (ref 37.5–51)
HCT VFR BLD AUTO: 44.7 % (ref 37.5–51)
HDLC SERPL-MCNC: 36 MG/DL (ref 40–60)
HGB BLD-MCNC: 14 G/DL (ref 13–17.7)
HGB BLD-MCNC: 15.1 G/DL (ref 13–17.7)
IMM GRANULOCYTES # BLD AUTO: 0.03 10*3/MM3 (ref 0–0.05)
IMM GRANULOCYTES NFR BLD AUTO: 0.5 % (ref 0–0.5)
LDLC SERPL CALC-MCNC: 53 MG/DL (ref 0–100)
LDLC/HDLC SERPL: 1.42 {RATIO}
LYMPHOCYTES # BLD AUTO: 1.7 10*3/MM3 (ref 0.7–3.1)
LYMPHOCYTES NFR BLD AUTO: 28.1 % (ref 19.6–45.3)
MCH RBC QN AUTO: 27.6 PG (ref 26.6–33)
MCH RBC QN AUTO: 28.2 PG (ref 26.6–33)
MCHC RBC AUTO-ENTMCNC: 32.7 G/DL (ref 31.5–35.7)
MCHC RBC AUTO-ENTMCNC: 33.8 G/DL (ref 31.5–35.7)
MCV RBC AUTO: 83.4 FL (ref 79–97)
MCV RBC AUTO: 84.4 FL (ref 79–97)
MONOCYTES # BLD AUTO: 0.56 10*3/MM3 (ref 0.1–0.9)
MONOCYTES NFR BLD AUTO: 9.3 % (ref 5–12)
NEUTROPHILS NFR BLD AUTO: 3.53 10*3/MM3 (ref 1.7–7)
NEUTROPHILS NFR BLD AUTO: 58.3 % (ref 42.7–76)
NRBC BLD AUTO-RTO: 0 /100 WBC (ref 0–0.2)
PLATELET # BLD AUTO: 158 10*3/MM3 (ref 140–450)
PLATELET # BLD AUTO: 190 10*3/MM3 (ref 140–450)
PMV BLD AUTO: 11.7 FL (ref 6–12)
PMV BLD AUTO: 11.7 FL (ref 6–12)
POTASSIUM SERPL-SCNC: 3.7 MMOL/L (ref 3.5–5.2)
POTASSIUM SERPL-SCNC: 3.9 MMOL/L (ref 3.5–5.2)
PROT SERPL-MCNC: 6.6 G/DL (ref 6–8.5)
QT INTERVAL: 380 MS
QT INTERVAL: 390 MS
QT INTERVAL: 424 MS
QTC INTERVAL: 421 MS
QTC INTERVAL: 421 MS
QTC INTERVAL: 437 MS
RBC # BLD AUTO: 5.07 10*6/MM3 (ref 4.14–5.8)
RBC # BLD AUTO: 5.36 10*6/MM3 (ref 4.14–5.8)
SODIUM SERPL-SCNC: 137 MMOL/L (ref 136–145)
SODIUM SERPL-SCNC: 140 MMOL/L (ref 136–145)
TRIGL SERPL-MCNC: 120 MG/DL (ref 0–150)
TROPONIN T SERPL HS-MCNC: 9 NG/L
TROPONIN T SERPL HS-MCNC: 9 NG/L
VLDLC SERPL-MCNC: 22 MG/DL (ref 5–40)
WBC NRBC COR # BLD AUTO: 6.05 10*3/MM3 (ref 3.4–10.8)
WBC NRBC COR # BLD AUTO: 8.48 10*3/MM3 (ref 3.4–10.8)

## 2024-09-15 PROCEDURE — 25810000003 SODIUM CHLORIDE 0.9 % SOLUTION: Performed by: NURSE PRACTITIONER

## 2024-09-15 PROCEDURE — 93010 ELECTROCARDIOGRAM REPORT: CPT | Performed by: INTERNAL MEDICINE

## 2024-09-15 PROCEDURE — G0378 HOSPITAL OBSERVATION PER HR: HCPCS

## 2024-09-15 PROCEDURE — 80061 LIPID PANEL: CPT | Performed by: NURSE PRACTITIONER

## 2024-09-15 PROCEDURE — 84484 ASSAY OF TROPONIN QUANT: CPT | Performed by: NURSE PRACTITIONER

## 2024-09-15 PROCEDURE — 99232 SBSQ HOSP IP/OBS MODERATE 35: CPT | Performed by: STUDENT IN AN ORGANIZED HEALTH CARE EDUCATION/TRAINING PROGRAM

## 2024-09-15 PROCEDURE — 80048 BASIC METABOLIC PNL TOTAL CA: CPT | Performed by: NURSE PRACTITIONER

## 2024-09-15 PROCEDURE — 85025 COMPLETE CBC W/AUTO DIFF WBC: CPT | Performed by: NURSE PRACTITIONER

## 2024-09-15 PROCEDURE — 86140 C-REACTIVE PROTEIN: CPT | Performed by: INTERNAL MEDICINE

## 2024-09-15 PROCEDURE — 93005 ELECTROCARDIOGRAM TRACING: CPT | Performed by: NURSE PRACTITIONER

## 2024-09-15 PROCEDURE — 99222 1ST HOSP IP/OBS MODERATE 55: CPT | Performed by: INTERNAL MEDICINE

## 2024-09-15 PROCEDURE — 83036 HEMOGLOBIN GLYCOSYLATED A1C: CPT | Performed by: NURSE PRACTITIONER

## 2024-09-15 PROCEDURE — 94799 UNLISTED PULMONARY SVC/PX: CPT

## 2024-09-15 PROCEDURE — 85652 RBC SED RATE AUTOMATED: CPT | Performed by: INTERNAL MEDICINE

## 2024-09-15 PROCEDURE — 85027 COMPLETE CBC AUTOMATED: CPT | Performed by: NURSE PRACTITIONER

## 2024-09-15 PROCEDURE — 82948 REAGENT STRIP/BLOOD GLUCOSE: CPT

## 2024-09-15 RX ORDER — AMOXICILLIN 250 MG
2 CAPSULE ORAL 2 TIMES DAILY PRN
Status: DISCONTINUED | OUTPATIENT
Start: 2024-09-15 | End: 2024-09-16 | Stop reason: HOSPADM

## 2024-09-15 RX ORDER — SODIUM CHLORIDE 0.9 % (FLUSH) 0.9 %
10 SYRINGE (ML) INJECTION EVERY 12 HOURS SCHEDULED
Status: DISCONTINUED | OUTPATIENT
Start: 2024-09-15 | End: 2024-09-16 | Stop reason: HOSPADM

## 2024-09-15 RX ORDER — LANOLIN ALCOHOL/MO/W.PET/CERES
500 CREAM (GRAM) TOPICAL DAILY
Status: DISCONTINUED | OUTPATIENT
Start: 2024-09-15 | End: 2024-09-16 | Stop reason: HOSPADM

## 2024-09-15 RX ORDER — ACETAMINOPHEN 160 MG/5ML
650 SOLUTION ORAL EVERY 4 HOURS PRN
Status: DISCONTINUED | OUTPATIENT
Start: 2024-09-15 | End: 2024-09-16 | Stop reason: HOSPADM

## 2024-09-15 RX ORDER — NITROGLYCERIN 0.4 MG/1
0.4 TABLET SUBLINGUAL
Status: DISCONTINUED | OUTPATIENT
Start: 2024-09-15 | End: 2024-09-16 | Stop reason: HOSPADM

## 2024-09-15 RX ORDER — ACETAMINOPHEN 650 MG/1
650 SUPPOSITORY RECTAL EVERY 4 HOURS PRN
Status: DISCONTINUED | OUTPATIENT
Start: 2024-09-15 | End: 2024-09-16 | Stop reason: HOSPADM

## 2024-09-15 RX ORDER — ROSUVASTATIN CALCIUM 20 MG/1
20 TABLET, COATED ORAL DAILY
Status: DISCONTINUED | OUTPATIENT
Start: 2024-09-15 | End: 2024-09-16 | Stop reason: HOSPADM

## 2024-09-15 RX ORDER — ASPIRIN 325 MG
325 TABLET, DELAYED RELEASE (ENTERIC COATED) ORAL DAILY
Status: DISCONTINUED | OUTPATIENT
Start: 2024-09-15 | End: 2024-09-16 | Stop reason: HOSPADM

## 2024-09-15 RX ORDER — BISACODYL 5 MG/1
5 TABLET, DELAYED RELEASE ORAL DAILY PRN
Status: DISCONTINUED | OUTPATIENT
Start: 2024-09-15 | End: 2024-09-16 | Stop reason: HOSPADM

## 2024-09-15 RX ORDER — ALPRAZOLAM 0.5 MG
0.5 TABLET ORAL DAILY PRN
Status: DISCONTINUED | OUTPATIENT
Start: 2024-09-15 | End: 2024-09-15

## 2024-09-15 RX ORDER — ACETAMINOPHEN 325 MG/1
650 TABLET ORAL EVERY 4 HOURS PRN
Status: DISCONTINUED | OUTPATIENT
Start: 2024-09-15 | End: 2024-09-16 | Stop reason: HOSPADM

## 2024-09-15 RX ORDER — BISACODYL 10 MG
10 SUPPOSITORY, RECTAL RECTAL DAILY PRN
Status: DISCONTINUED | OUTPATIENT
Start: 2024-09-15 | End: 2024-09-16 | Stop reason: HOSPADM

## 2024-09-15 RX ORDER — POLYETHYLENE GLYCOL 3350 17 G/17G
17 POWDER, FOR SOLUTION ORAL DAILY PRN
Status: DISCONTINUED | OUTPATIENT
Start: 2024-09-15 | End: 2024-09-16 | Stop reason: HOSPADM

## 2024-09-15 RX ORDER — SODIUM CHLORIDE 0.9 % (FLUSH) 0.9 %
10 SYRINGE (ML) INJECTION AS NEEDED
Status: DISCONTINUED | OUTPATIENT
Start: 2024-09-15 | End: 2024-09-16 | Stop reason: HOSPADM

## 2024-09-15 RX ORDER — SODIUM CHLORIDE 9 MG/ML
40 INJECTION, SOLUTION INTRAVENOUS AS NEEDED
Status: DISCONTINUED | OUTPATIENT
Start: 2024-09-15 | End: 2024-09-16 | Stop reason: HOSPADM

## 2024-09-15 RX ORDER — NEBIVOLOL 5 MG/1
5 TABLET ORAL DAILY
Status: DISCONTINUED | OUTPATIENT
Start: 2024-09-15 | End: 2024-09-16 | Stop reason: HOSPADM

## 2024-09-15 RX ADMIN — ALPRAZOLAM 0.5 MG: 0.5 TABLET ORAL at 01:51

## 2024-09-15 RX ADMIN — ROSUVASTATIN 20 MG: 20 TABLET, FILM COATED ORAL at 08:52

## 2024-09-15 RX ADMIN — ASPIRIN 325 MG: 325 TABLET, COATED ORAL at 08:52

## 2024-09-15 RX ADMIN — CYANOCOBALAMIN TAB 1000 MCG 500 MCG: 1000 TAB at 08:52

## 2024-09-15 RX ADMIN — Medication 10 ML: at 20:36

## 2024-09-15 RX ADMIN — Medication 10 ML: at 00:45

## 2024-09-15 RX ADMIN — NEBIVOLOL 5 MG: 5 TABLET ORAL at 08:52

## 2024-09-15 RX ADMIN — Medication 10 ML: at 08:52

## 2024-09-15 RX ADMIN — SODIUM CHLORIDE 500 ML: 9 INJECTION, SOLUTION INTRAVENOUS at 04:37

## 2024-09-16 ENCOUNTER — APPOINTMENT (OUTPATIENT)
Dept: CARDIOLOGY | Facility: HOSPITAL | Age: 69
End: 2024-09-16
Payer: MEDICARE

## 2024-09-16 VITALS
RESPIRATION RATE: 18 BRPM | DIASTOLIC BLOOD PRESSURE: 84 MMHG | HEART RATE: 70 BPM | HEIGHT: 65 IN | OXYGEN SATURATION: 98 % | SYSTOLIC BLOOD PRESSURE: 149 MMHG | TEMPERATURE: 97.6 F | WEIGHT: 167.6 LBS | BODY MASS INDEX: 27.92 KG/M2

## 2024-09-16 LAB
BH CV REST NUCLEAR ISOTOPE DOSE: 26.5 MCI
BH CV STRESS BP STAGE 2: NORMAL
BH CV STRESS BP STAGE 4: NORMAL
BH CV STRESS COMMENTS STAGE 1: NORMAL
BH CV STRESS DOSE REGADENOSON STAGE 1: 0.4
BH CV STRESS DURATION MIN STAGE 1: 1
BH CV STRESS DURATION MIN STAGE 2: 1
BH CV STRESS DURATION MIN STAGE 3: 1
BH CV STRESS DURATION MIN STAGE 4: 1
BH CV STRESS DURATION SEC STAGE 1: 0
BH CV STRESS DURATION SEC STAGE 2: 0
BH CV STRESS DURATION SEC STAGE 3: 0
BH CV STRESS DURATION SEC STAGE 4: 0
BH CV STRESS HR STAGE 1: 96
BH CV STRESS HR STAGE 2: 103
BH CV STRESS HR STAGE 3: 102
BH CV STRESS HR STAGE 4: 96
BH CV STRESS NUCLEAR ISOTOPE DOSE: 26.5 MCI
BH CV STRESS O2 STAGE 1: 98
BH CV STRESS O2 STAGE 2: 98
BH CV STRESS O2 STAGE 3: 98
BH CV STRESS O2 STAGE 4: 99
BH CV STRESS PROTOCOL 1: NORMAL
BH CV STRESS RECOVERY BP: NORMAL MMHG
BH CV STRESS RECOVERY HR: 86 BPM
BH CV STRESS RECOVERY O2: 98 %
BH CV STRESS STAGE 1: 1
BH CV STRESS STAGE 2: 2
BH CV STRESS STAGE 3: 3
BH CV STRESS STAGE 4: 4
LV EF NUC BP: 68 %
MAXIMAL PREDICTED HEART RATE: 153 BPM
PERCENT MAX PREDICTED HR: 69.93 %
STRESS BASELINE BP: NORMAL MMHG
STRESS BASELINE HR: 63 BPM
STRESS O2 SAT REST: 97 %
STRESS PERCENT HR: 82 %
STRESS POST ESTIMATED WORKLOAD: 1 METS
STRESS POST EXERCISE DUR MIN: 4 MIN
STRESS POST EXERCISE DUR SEC: 0 SEC
STRESS POST O2 SAT PEAK: 97 %
STRESS POST PEAK BP: NORMAL MMHG
STRESS POST PEAK HR: 107 BPM
STRESS TARGET HR: 130 BPM

## 2024-09-16 PROCEDURE — 99239 HOSP IP/OBS DSCHRG MGMT >30: CPT | Performed by: NURSE PRACTITIONER

## 2024-09-16 PROCEDURE — G0378 HOSPITAL OBSERVATION PER HR: HCPCS

## 2024-09-16 PROCEDURE — 78431 MYOCRD IMG PET RST&STRS CT: CPT

## 2024-09-16 PROCEDURE — 25010000002 REGADENOSON 0.4 MG/5ML SOLUTION: Performed by: INTERNAL MEDICINE

## 2024-09-16 PROCEDURE — 99232 SBSQ HOSP IP/OBS MODERATE 35: CPT | Performed by: INTERNAL MEDICINE

## 2024-09-16 PROCEDURE — 0 RUBIDIUM CHLORIDE: Performed by: INTERNAL MEDICINE

## 2024-09-16 PROCEDURE — 93018 CV STRESS TEST I&R ONLY: CPT | Performed by: INTERNAL MEDICINE

## 2024-09-16 PROCEDURE — 93017 CV STRESS TEST TRACING ONLY: CPT

## 2024-09-16 PROCEDURE — A9555 RB82 RUBIDIUM: HCPCS | Performed by: INTERNAL MEDICINE

## 2024-09-16 PROCEDURE — 78431 MYOCRD IMG PET RST&STRS CT: CPT | Performed by: INTERNAL MEDICINE

## 2024-09-16 RX ORDER — NITROGLYCERIN 0.4 MG/1
0.4 TABLET SUBLINGUAL
Qty: 25 TABLET | Refills: 1 | Status: SHIPPED | OUTPATIENT
Start: 2024-09-16

## 2024-09-16 RX ORDER — REGADENOSON 0.08 MG/ML
0.4 INJECTION, SOLUTION INTRAVENOUS ONCE
Status: COMPLETED | OUTPATIENT
Start: 2024-09-16 | End: 2024-09-16

## 2024-09-16 RX ADMIN — REGADENOSON 0.4 MG: 0.08 INJECTION, SOLUTION INTRAVENOUS at 13:52

## 2024-09-16 RX ADMIN — RUBIDIUM CHLORIDE RB-82 1 DOSE: 150 INJECTION, SOLUTION INTRAVENOUS at 13:45

## 2024-09-16 RX ADMIN — RUBIDIUM CHLORIDE RB-82 1 DOSE: 150 INJECTION, SOLUTION INTRAVENOUS at 13:38

## 2024-09-18 LAB
QT INTERVAL: 390 MS
QTC INTERVAL: 405 MS

## 2024-09-20 ENCOUNTER — OFFICE VISIT (OUTPATIENT)
Dept: CARDIOLOGY | Facility: CLINIC | Age: 69
End: 2024-09-20
Payer: MEDICARE

## 2024-09-20 VITALS
HEART RATE: 73 BPM | OXYGEN SATURATION: 98 % | DIASTOLIC BLOOD PRESSURE: 70 MMHG | WEIGHT: 167.8 LBS | HEIGHT: 65 IN | BODY MASS INDEX: 27.96 KG/M2 | SYSTOLIC BLOOD PRESSURE: 124 MMHG

## 2024-09-20 DIAGNOSIS — I10 ESSENTIAL HYPERTENSION: ICD-10-CM

## 2024-09-20 DIAGNOSIS — E78.5 DYSLIPIDEMIA: ICD-10-CM

## 2024-09-20 DIAGNOSIS — I25.810 CORONARY ARTERY DISEASE INVOLVING CORONARY BYPASS GRAFT OF NATIVE HEART WITHOUT ANGINA PECTORIS: Primary | ICD-10-CM

## 2025-05-20 ENCOUNTER — TELEPHONE (OUTPATIENT)
Dept: CARDIOLOGY | Facility: CLINIC | Age: 70
End: 2025-05-20
Payer: MEDICARE

## 2025-05-22 NOTE — TELEPHONE ENCOUNTER
Lvm for patient to return call.   Location Indication Override (Is Already Calculated Based On Selected Body Location): Area M

## 2025-05-22 NOTE — TELEPHONE ENCOUNTER
"  Caller: Judah Cerna \"Judah Cerna\"    Relationship: Self    What was the call regarding: PATIENT WOULD LIKE TO GO AHEAD AND RESCHEDULE. NO AVAILABILITY ON MY END TILL AUGUST. NOT SURE IF SCHEDULING TEAM HAS SOMETHING SOONER FOR THE PATIENT. APPT FOR TOMORROW HAS BEEN CANCELED. PLEASE RETURN CALL.       "

## 2025-05-30 ENCOUNTER — OFFICE VISIT (OUTPATIENT)
Dept: CARDIOLOGY | Facility: CLINIC | Age: 70
End: 2025-05-30
Payer: MEDICARE

## 2025-05-30 VITALS
OXYGEN SATURATION: 97 % | WEIGHT: 167.8 LBS | BODY MASS INDEX: 27.96 KG/M2 | HEART RATE: 77 BPM | HEIGHT: 65 IN | DIASTOLIC BLOOD PRESSURE: 74 MMHG | SYSTOLIC BLOOD PRESSURE: 122 MMHG

## 2025-05-30 DIAGNOSIS — I25.810 CORONARY ARTERY DISEASE INVOLVING CORONARY BYPASS GRAFT OF NATIVE HEART WITHOUT ANGINA PECTORIS: Primary | ICD-10-CM

## 2025-05-30 DIAGNOSIS — I10 ESSENTIAL HYPERTENSION: ICD-10-CM

## 2025-05-30 DIAGNOSIS — E78.5 DYSLIPIDEMIA: ICD-10-CM

## 2025-05-30 NOTE — PROGRESS NOTES
"[facility] Cardiology    Encounter Date: 2025    Patient ID: Judah Cerna is a 69 y.o. male.  : 1955     PCP: Justin Casas APRN       Chief Complaint: Coronary artery disease involving coronary bypass graft of       PROBLEM LIST:  Coronary artery disease  History of \"small heart attack,\" in . cardiac catheterization study followed by 2 coronary stents; incomplete database.  [facility], 2018: Mildly abnormal left ventriculogram with an estimated ejection fraction of 60% and mild apical hypokinesia. Severe 3 vessel disease   Carotid duplex, 2018: There is bilateral internal carotid artery narrowing of <50%. Bilateral vertebral artery flow is antegrade.  CABGx5 (SVx4, VERITO x 1). EVH 10/02/2018  [facility], 2024: EF 68%. Normal with no evidence of ischemia.   Abnormal EKG  Hypertension   Dyslipidemia   Type 2 diabetes mellitus    History of Present Illness  Patient presents today for a follow-up with a history of CAD and cardiac risk factors. Since last visit, doing well from a cardiac standpoint. 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 He tolerates this well without difficulty.  He denies any chest pain, shortness of air, palpitations, with apnea, edema, presyncope or syncope.  Blood pressure has been well-controlled.  Routinely follows with his PCP.    No Known Allergies      Current Outpatient Medications:     ALPRAZolam (XANAX) 0.5 MG tablet, Take 1 tablet by mouth Daily As Needed., Disp: , Rfl:     aspirin 325 MG EC tablet, Take 1 tablet by mouth Daily., Disp: 30 tablet, Rfl: 1    glimepiride (AMARYL) 4 MG tablet, Take 1 tablet by mouth Every Morning Before Breakfast., Disp: , Rfl:     metFORMIN (GLUCOPHAGE) 1000 MG tablet, Take 1 tablet by mouth Daily With Breakfast., Disp: , Rfl:     nebivolol (BYSTOLIC) 5 MG tablet, Take 1 tablet by mouth Daily., Disp: , Rfl:     nitroglycerin (NITROSTAT) 0.4 MG SL tablet, Place 1 " "tablet under the tongue Every 5 (Five) Minutes As Needed for Chest Pain (Only if SBP Greater Than 100). Take no more than 3 doses in 15 minutes., Disp: 25 tablet, Rfl: 1    rosuvastatin (CRESTOR) 20 MG tablet, Take 1 tablet by mouth Daily., Disp: 90 tablet, Rfl: 3    tadalafil (CIALIS) 10 MG tablet, Take 1 tablet by mouth As Needed., Disp: , Rfl:     vitamin B-12 (CYANOCOBALAMIN) 500 MCG tablet, Take 1 tablet by mouth Daily. (Patient taking differently: Take 2 tablets by mouth Daily.), Disp: 30 tablet, Rfl: 0    The following portions of the patient's history were reviewed and updated as appropriate: allergies, current medications, past family history, past medical history, past social history, past surgical history and problem list.          Objective:     /74 (BP Location: Right arm, Patient Position: Sitting, Cuff Size: Adult)   Pulse 77   Ht 165.1 cm (65\")   Wt 76.1 kg (167 lb 12.8 oz)   SpO2 97%   BMI 27.92 kg/m²      Physical Exam  Constitutional: Patient appears well-developed and well-nourished.   Neck: Neck supple. No JVD present.   Cardiovascular: Normal rate, regular rhythm and normal heart sounds. No murmur heard.   2+ symmetric pulses.   Pulmonary/Chest: Breath sounds normal. Does not exhibit tenderness.   Musculoskeletal: Does not exhibit edema.   Vitals reviewed.    Data Review:   Lab Results   Component Value Date    GLUCOSE 141 (H) 09/15/2024    BUN 10 09/15/2024    CREATININE 0.90 09/15/2024    EGFR 93.6 09/15/2024    BCR 11.1 09/15/2024     09/15/2024    K 3.7 09/15/2024    CO2 23.0 09/15/2024    CALCIUM 8.9 09/15/2024    ALBUMIN 4.2 09/14/2024    AST 25 09/14/2024    ALT 23 09/14/2024     Lab Results   Component Value Date    CHOL 111 09/15/2024    TRIG 120 09/15/2024    HDL 36 (L) 09/15/2024    LDL 53 09/15/2024      Lab Results   Component Value Date    WBC 6.05 09/15/2024    RBC 5.07 09/15/2024    HGB 14.0 09/15/2024    HCT 42.8 09/15/2024    MCV 84.4 09/15/2024     " 09/15/2024     Lab Results   Component Value Date    HGBA1C 8.30 (H) 09/15/2024        Procedures       Advance Care Planning   ACP discussion was held with the patient during this visit. Patient does not have an advance directive, declines further assistance.           Assessment and plan:      Diagnosis Plan   1. Coronary artery disease involving coronary bypass graft of native heart without angina pectoris  Stable without current angina.  Continue aspirin and Crestor.      2. Essential hypertension  Well-controlled.  Continue Bystolic.  Heart healthy diet and exercise.      3. Dyslipidemia  LDL at target all labs reviewed from September.  Continue Crestor 20 mg daily.  Repeat labs to be obtained with PCP.  If LDL is greater than 70, uptitrate Crestor to 40 mg daily.        Stable cardiac status.   Continue current medications.   FU in 12 MO, sooner as needed.  Thank you for allowing us to participate in the care of your patient.       Rivka Nicholas PA-C      Please note that portions of this note may have been completed with a voice recognition program. Efforts were made to edit the dictations, but occasionally words are mistranscribed.

## 2025-07-02 ENCOUNTER — APPOINTMENT (OUTPATIENT)
Dept: GENERAL RADIOLOGY | Facility: HOSPITAL | Age: 70
End: 2025-07-02
Payer: MEDICARE

## 2025-07-02 ENCOUNTER — HOSPITAL ENCOUNTER (EMERGENCY)
Facility: HOSPITAL | Age: 70
Discharge: HOME | End: 2025-07-02
Attending: EMERGENCY MEDICINE | Admitting: EMERGENCY MEDICINE
Payer: MEDICARE

## 2025-07-02 VITALS
HEIGHT: 65 IN | WEIGHT: 161 LBS | RESPIRATION RATE: 20 BRPM | HEART RATE: 76 BPM | BODY MASS INDEX: 26.82 KG/M2 | TEMPERATURE: 98.4 F | SYSTOLIC BLOOD PRESSURE: 123 MMHG | DIASTOLIC BLOOD PRESSURE: 72 MMHG | OXYGEN SATURATION: 99 %

## 2025-07-02 DIAGNOSIS — R42 LIGHTHEADEDNESS: Primary | ICD-10-CM

## 2025-07-02 DIAGNOSIS — R73.9 HYPERGLYCEMIA: ICD-10-CM

## 2025-07-02 DIAGNOSIS — Z86.79 HISTORY OF CORONARY ARTERY DISEASE: ICD-10-CM

## 2025-07-02 DIAGNOSIS — Z86.79 HISTORY OF CHF (CONGESTIVE HEART FAILURE): ICD-10-CM

## 2025-07-02 LAB
ALBUMIN SERPL-MCNC: 4.2 G/DL (ref 3.5–5.2)
ALBUMIN/GLOB SERPL: 1.9 G/DL
ALP SERPL-CCNC: 90 U/L (ref 39–117)
ALT SERPL W P-5'-P-CCNC: 31 U/L (ref 1–41)
ANION GAP SERPL CALCULATED.3IONS-SCNC: 13.3 MMOL/L (ref 5–15)
AST SERPL-CCNC: 28 U/L (ref 1–40)
BACTERIA UR QL AUTO: NORMAL /HPF
BASOPHILS # BLD AUTO: 0.04 10*3/MM3 (ref 0–0.2)
BASOPHILS NFR BLD AUTO: 0.6 % (ref 0–1.5)
BILIRUB SERPL-MCNC: 0.5 MG/DL (ref 0–1.2)
BILIRUB UR QL STRIP: NEGATIVE
BUN SERPL-MCNC: 11.8 MG/DL (ref 8–23)
BUN/CREAT SERPL: 10.2 (ref 7–25)
CALCIUM SPEC-SCNC: 9.5 MG/DL (ref 8.6–10.5)
CHLORIDE SERPL-SCNC: 101 MMOL/L (ref 98–107)
CLARITY UR: CLEAR
CO2 SERPL-SCNC: 21.7 MMOL/L (ref 22–29)
COLOR UR: YELLOW
CREAT SERPL-MCNC: 1.16 MG/DL (ref 0.76–1.27)
DEPRECATED RDW RBC AUTO: 37.2 FL (ref 37–54)
EGFRCR SERPLBLD CKD-EPI 2021: 68.6 ML/MIN/1.73
EOSINOPHIL # BLD AUTO: 0.15 10*3/MM3 (ref 0–0.4)
EOSINOPHIL NFR BLD AUTO: 2.3 % (ref 0.3–6.2)
ERYTHROCYTE [DISTWIDTH] IN BLOOD BY AUTOMATED COUNT: 12.5 % (ref 12.3–15.4)
GLOBULIN UR ELPH-MCNC: 2.2 GM/DL
GLUCOSE SERPL-MCNC: 332 MG/DL (ref 65–99)
GLUCOSE UR STRIP-MCNC: ABNORMAL MG/DL
HCT VFR BLD AUTO: 46.2 % (ref 37.5–51)
HGB BLD-MCNC: 15.4 G/DL (ref 13–17.7)
HGB UR QL STRIP.AUTO: NEGATIVE
HOLD SPECIMEN: NORMAL
HYALINE CASTS UR QL AUTO: NORMAL /LPF
IMM GRANULOCYTES # BLD AUTO: 0.03 10*3/MM3 (ref 0–0.05)
IMM GRANULOCYTES NFR BLD AUTO: 0.5 % (ref 0–0.5)
KETONES UR QL STRIP: ABNORMAL
LEUKOCYTE ESTERASE UR QL STRIP.AUTO: NEGATIVE
LYMPHOCYTES # BLD AUTO: 2.12 10*3/MM3 (ref 0.7–3.1)
LYMPHOCYTES NFR BLD AUTO: 32.6 % (ref 19.6–45.3)
MAGNESIUM SERPL-MCNC: 1.8 MG/DL (ref 1.6–2.4)
MCH RBC QN AUTO: 27.3 PG (ref 26.6–33)
MCHC RBC AUTO-ENTMCNC: 33.3 G/DL (ref 31.5–35.7)
MCV RBC AUTO: 81.8 FL (ref 79–97)
MONOCYTES # BLD AUTO: 0.47 10*3/MM3 (ref 0.1–0.9)
MONOCYTES NFR BLD AUTO: 7.2 % (ref 5–12)
NEUTROPHILS NFR BLD AUTO: 3.69 10*3/MM3 (ref 1.7–7)
NEUTROPHILS NFR BLD AUTO: 56.8 % (ref 42.7–76)
NITRITE UR QL STRIP: NEGATIVE
NRBC BLD AUTO-RTO: 0 /100 WBC (ref 0–0.2)
PH UR STRIP.AUTO: <=5 [PH] (ref 5–8)
PLATELET # BLD AUTO: 175 10*3/MM3 (ref 140–450)
PMV BLD AUTO: 12 FL (ref 6–12)
POTASSIUM SERPL-SCNC: 3.8 MMOL/L (ref 3.5–5.2)
PROT SERPL-MCNC: 6.4 G/DL (ref 6–8.5)
PROT UR QL STRIP: ABNORMAL
QT INTERVAL: 390 MS
QTC INTERVAL: 435 MS
RBC # BLD AUTO: 5.65 10*6/MM3 (ref 4.14–5.8)
RBC # UR STRIP: NORMAL /HPF
REF LAB TEST METHOD: NORMAL
SODIUM SERPL-SCNC: 136 MMOL/L (ref 136–145)
SP GR UR STRIP: 1.04 (ref 1–1.03)
SQUAMOUS #/AREA URNS HPF: NORMAL /HPF
TROPONIN T SERPL HS-MCNC: 11 NG/L
UROBILINOGEN UR QL STRIP: ABNORMAL
WBC # UR STRIP: NORMAL /HPF
WBC NRBC COR # BLD AUTO: 6.5 10*3/MM3 (ref 3.4–10.8)
WHOLE BLOOD HOLD COAG: NORMAL
WHOLE BLOOD HOLD SPECIMEN: NORMAL

## 2025-07-02 PROCEDURE — 99284 EMERGENCY DEPT VISIT MOD MDM: CPT

## 2025-07-02 PROCEDURE — 25810000003 SODIUM CHLORIDE 0.9 % SOLUTION: Performed by: EMERGENCY MEDICINE

## 2025-07-02 PROCEDURE — 85025 COMPLETE CBC W/AUTO DIFF WBC: CPT | Performed by: EMERGENCY MEDICINE

## 2025-07-02 PROCEDURE — 80053 COMPREHEN METABOLIC PANEL: CPT | Performed by: EMERGENCY MEDICINE

## 2025-07-02 PROCEDURE — 83735 ASSAY OF MAGNESIUM: CPT | Performed by: EMERGENCY MEDICINE

## 2025-07-02 PROCEDURE — 81001 URINALYSIS AUTO W/SCOPE: CPT | Performed by: EMERGENCY MEDICINE

## 2025-07-02 PROCEDURE — 84484 ASSAY OF TROPONIN QUANT: CPT | Performed by: EMERGENCY MEDICINE

## 2025-07-02 PROCEDURE — 71045 X-RAY EXAM CHEST 1 VIEW: CPT

## 2025-07-02 PROCEDURE — 93005 ELECTROCARDIOGRAM TRACING: CPT | Performed by: EMERGENCY MEDICINE

## 2025-07-02 PROCEDURE — 36415 COLL VENOUS BLD VENIPUNCTURE: CPT

## 2025-07-02 RX ORDER — SODIUM CHLORIDE 0.9 % (FLUSH) 0.9 %
10 SYRINGE (ML) INJECTION AS NEEDED
Status: DISCONTINUED | OUTPATIENT
Start: 2025-07-02 | End: 2025-07-02 | Stop reason: HOSPADM

## 2025-07-02 RX ADMIN — SODIUM CHLORIDE 1000 ML: 9 INJECTION, SOLUTION INTRAVENOUS at 12:18

## 2025-07-02 NOTE — ED PROVIDER NOTES
[place]    EMERGENCY DEPARTMENT ENCOUNTER      Pt Name: Judah Cerna  MRN: [id number]  YOB: 1955  Date of evaluation: 7/2/2025  Provider: Cristofer Gar MD    CHIEF COMPLAINT       Chief Complaint   Patient presents with    Dizziness         HISTORY OF PRESENT ILLNESS   Judah Cerna is a 69 y.o. male who presents to the emergency department with complaint of an episode of lightheadedness when he was out walking today.  Patient reports that he walks 5 to 6 miles per day.  He admits that he did not drink much water today he was out walking.  He said that he got very hot and suddenly became lightheaded as if he was going to pass out.  He denies any associated chest pain or discomfort, shortness of breath, or palpitations.  He does have prior history of coronary artery disease and is a diabetic.  He says that he feels much better currently and is asymptomatic.      Nursing notes were reviewed.    REVIEW OF SYSTEMS     ROS:  A chief complaint appropriate review of systems was completed and is negative except as noted in the HPI.      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    CARDIAC CATHETERIZATION      CARDIAC CATHETERIZATION N/A 09/29/2018    Procedure: Left Heart Cath;  Surgeon: Nadir Tanner MD;  Location: [facility] CATH INVASIVE LOCATION;  Service: Cardiology    CAROTID STENT      CORONARY ANGIOPLASTY      CORONARY ARTERY BYPASS GRAFT N/A 10/02/2018    Procedure: MEDIAN STERNOTOMY,CORONARY ARTERY BYPASS WITH INTERNAL  MAMMARY ARTERY GRAFT  X 5, EVH OF THE RIGHT GREATER SAPHENOUS VEIN;  Surgeon: Shane Bosch MD;  Location: [facility] OR;  Service: Cardiothoracic    CORONARY STENT PLACEMENT      x 2         CURRENT MEDICATIONS     No current facility-administered medications for this encounter.    Current Outpatient Medications:     ALPRAZolam (XANAX) 0.5 MG tablet, Take 1 tablet by mouth Daily As Needed., Disp: , Rfl:     aspirin 325 MG EC tablet, Take 1 tablet by mouth Daily., Disp: 30 tablet, Rfl: 1    glimepiride (AMARYL) 4 MG tablet, Take 1 tablet by mouth Every Morning Before Breakfast., Disp: , Rfl:     metFORMIN (GLUCOPHAGE) 1000 MG tablet, Take 1 tablet by mouth Daily With Breakfast., Disp: , Rfl:     nebivolol (BYSTOLIC) 5 MG tablet, Take 1 tablet by mouth Daily., Disp: , Rfl:     nitroglycerin (NITROSTAT) 0.4 MG SL tablet, Place 1 tablet under the tongue Every 5 (Five) Minutes As Needed for Chest Pain (Only if SBP Greater Than 100). Take no more than 3 doses in 15 minutes., Disp: 25 tablet, Rfl: 1    rosuvastatin (CRESTOR) 20 MG tablet, Take 1 tablet by mouth Daily., Disp: 90 tablet, Rfl: 3    tadalafil (CIALIS) 10 MG tablet, Take 1 tablet by mouth As Needed., Disp: , Rfl:     vitamin B-12 (CYANOCOBALAMIN) 500 MCG tablet, Take 1 tablet by mouth Daily. (Patient taking differently: Take 2 tablets by mouth Daily.), Disp: 30 tablet, Rfl: 0    ALLERGIES     Patient has no known allergies.    FAMILY HISTORY       Family History   Problem Relation Age of Onset    No Known Problems Mother     No Known Problems Father     No Known Problems Brother     No Known Problems Brother           SOCIAL HISTORY       Social History     Socioeconomic History    Marital status:     Number of children: 2   Tobacco Use    Smoking status: Never     Passive exposure: Past    Smokeless tobacco: Never   Vaping Use    Vaping status: Never Used   Substance and Sexual Activity    Alcohol use: No    Drug use: No    Sexual activity: Never          PHYSICAL EXAM    (up to 7 for level 4, 8 or more for level 5)     Vitals:    07/02/25 1300 07/02/25 1330 07/02/25 1400 07/02/25 1419   BP: 118/70 116/72 123/72 123/72   Pulse: 69 66 76 76   Resp:    20   Temp:       SpO2: 97% 98% 99% 99%   Weight:       Height:           General: Awake, alert, no acute distress.  HEENT: Conjunctivae normal.  Neck: Trachea midline.  Cardiac: Heart regular rate, rhythm, no murmurs, rubs, or gallops  Lungs: Lungs are clear to auscultation, there is no wheezing, rhonchi, or rales. There is no use of accessory muscles.  Abdomen: Abdomen is soft, nontender, nondistended. There are no firm or pulsatile masses, no rebound rigidity or guarding.   Musculoskeletal: No deformity.  Neuro: Alert and oriented x 4.  5 out of 5 strength in all 4 extremities with no sensory deficits.  Dermatology: Skin is warm and dry  Psych: Mentation is grossly normal, cognition is grossly normal. Affect is appropriate.        DIAGNOSTIC RESULTS     EKG: All EKGs are interpreted by the Emergency Department Physician who either signs or Co-signs this chart in the absence of a cardiologist.    ECG 12 Lead Other; Dizziness   Preliminary Result   Test Reason : dizziness   Blood Pressure :   */*   mmHG   Vent. Rate :  75 BPM     Atrial Rate :  75 BPM      P-R Int : 142 ms          QRS Dur :  84 ms       QT Int : 390 ms       P-R-T Axes :  77   7   9 degrees     QTcB Int : 435 ms      Normal sinus rhythm   Possible Left atrial enlargement   Cannot rule out Inferior infarct , age undetermined   Abnormal ECG   When compared with ECG of 15-Sep-2024 02:02,   No significant change was found      Referred By: ed md           Confirmed By:             RADIOLOGY:   [x] Radiologist's Report Reviewed:  XR Chest 1 View   Final Result   Impression:   No acute cardiopulmonary abnormality is identified.            Electronically Signed: Ale Segura MD     7/2/2025 12:20 PM EDT     Workstation ID: [id number]          I ordered  and independently reviewed the above noted radiographic studies.        LABS:    I have reviewed and interpreted all of the currently available lab results from this visit (if applicable):  Results for orders placed or performed during the hospital encounter of 07/02/25   Comprehensive Metabolic Panel    Collection Time: 07/02/25 11:55 AM    Specimen: Blood   Result Value Ref Range    Glucose 332 (H) 65 - 99 mg/dL    BUN 11.8 8.0 - 23.0 mg/dL    Creatinine 1.16 0.76 - 1.27 mg/dL    Sodium 136 136 - 145 mmol/L    Potassium 3.8 3.5 - 5.2 mmol/L    Chloride 101 98 - 107 mmol/L    CO2 21.7 (L) 22.0 - 29.0 mmol/L    Calcium 9.5 8.6 - 10.5 mg/dL    Total Protein 6.4 6.0 - 8.5 g/dL    Albumin 4.2 3.5 - 5.2 g/dL    ALT (SGPT) 31 1 - 41 U/L    AST (SGOT) 28 1 - 40 U/L    Alkaline Phosphatase 90 39 - 117 U/L    Total Bilirubin 0.5 0.0 - 1.2 mg/dL    Globulin 2.2 gm/dL    A/G Ratio 1.9 g/dL    BUN/Creatinine Ratio 10.2 7.0 - 25.0    Anion Gap 13.3 5.0 - 15.0 mmol/L    eGFR 68.6 >60.0 mL/min/1.73   Magnesium    Collection Time: 07/02/25 11:55 AM    Specimen: Blood   Result Value Ref Range    Magnesium 1.8 1.6 - 2.4 mg/dL   CBC Auto Differential    Collection Time: 07/02/25 11:55 AM    Specimen: Blood   Result Value Ref Range    WBC 6.50 3.40 - 10.80 10*3/mm3    RBC 5.65 4.14 - 5.80 10*6/mm3    Hemoglobin 15.4 13.0 - 17.7 g/dL    Hematocrit 46.2 37.5 - 51.0 %    MCV 81.8 79.0 - 97.0 fL    MCH 27.3 26.6 - 33.0 pg    MCHC 33.3 31.5 - 35.7 g/dL    RDW 12.5 12.3 - 15.4 %    RDW-SD 37.2 37.0 - 54.0 fl    MPV 12.0 6.0 - 12.0 fL    Platelets 175 140 - 450 10*3/mm3    Neutrophil % 56.8 42.7 - 76.0 %    Lymphocyte % 32.6 19.6 - 45.3 %    Monocyte % 7.2 5.0 - 12.0 %    Eosinophil % 2.3 0.3 - 6.2 %    Basophil % 0.6 0.0 - 1.5 %    Immature Grans % 0.5 0.0 - 0.5 %    Neutrophils, Absolute 3.69 1.70 - 7.00 10*3/mm3    Lymphocytes, Absolute 2.12 0.70 - 3.10 10*3/mm3    Monocytes, Absolute 0.47 0.10 - 0.90 10*3/mm3    Eosinophils, Absolute  0.15 0.00 - 0.40 10*3/mm3    Basophils, Absolute 0.04 0.00 - 0.20 10*3/mm3    Immature Grans, Absolute 0.03 0.00 - 0.05 10*3/mm3    nRBC 0.0 0.0 - 0.2 /100 WBC   Green Top (Gel)    Collection Time: 07/02/25 11:55 AM   Result Value Ref Range    Extra Tube Hold for add-ons.    Lavender Top    Collection Time: 07/02/25 11:55 AM   Result Value Ref Range    Extra Tube hold for add-on    Gold Top - SST    Collection Time: 07/02/25 11:55 AM   Result Value Ref Range    Extra Tube Hold for add-ons.    Gray Top    Collection Time: 07/02/25 11:55 AM   Result Value Ref Range    Extra Tube Hold for add-ons.    Light Blue Top    Collection Time: 07/02/25 11:55 AM   Result Value Ref Range    Extra Tube Hold for add-ons.    ECG 12 Lead Other; Dizziness    Collection Time: 07/02/25 11:56 AM   Result Value Ref Range    QT Interval 390 ms    QTC Interval 435 ms   Urinalysis With Microscopic If Indicated (No Culture) - Urine, Clean Catch    Collection Time: 07/02/25 12:21 PM    Specimen: Urine, Clean Catch   Result Value Ref Range    Color, UA Yellow Yellow, Straw    Appearance, UA Clear Clear    pH, UA <=5.0 5.0 - 8.0    Specific Gravity, UA 1.045 (H) 1.001 - 1.030    Glucose, UA >=1000 mg/dL (3+) (A) Negative    Ketones, UA Trace (A) Negative    Bilirubin, UA Negative Negative    Blood, UA Negative Negative    Protein,  mg/dL (2+) (A) Negative    Leuk Esterase, UA Negative Negative    Nitrite, UA Negative Negative    Urobilinogen, UA 0.2 E.U./dL 0.2 - 1.0 E.U./dL   Urinalysis, Microscopic Only - Urine, Clean Catch    Collection Time: 07/02/25 12:21 PM    Specimen: Urine, Clean Catch   Result Value Ref Range    RBC, UA 0-2 None Seen, 0-2 /HPF    WBC, UA 0-2 None Seen, 0-2 /HPF    Bacteria, UA None Seen None Seen, Trace /HPF    Squamous Epithelial Cells, UA 0-2 None Seen, 0-2 /HPF    Hyaline Casts, UA 0-6 0 - 6 /LPF    Methodology Automated Microscopy    High Sensitivity Troponin T    Collection Time: 07/02/25 12:39 PM     Specimen: Blood   Result Value Ref Range    HS Troponin T 11 <22 ng/L        If labs were ordered, I independently reviewed the results and considered them in treating the patient.      EMERGENCY DEPARTMENT COURSE and DIFFERENTIAL DIAGNOSIS/MDM:   Vitals:  AS OF 19:40 EDT    BP - 123/72  HR - 76  TEMP - 98.4 °F (36.9 °C)  O2 SATS - 99%        Discussion below represents my analysis of pertinent findings related to patient's condition, differential diagnosis, treatment plan and final disposition.      Differential diagnosis:  The differential diagnosis associated with the patient's presentation includes: ACS, dysrhythmia, anemia, dehydration, electrolyte derangement      Independent interpretations (ECG/rhythm strip/X-ray/US/CT scan): I independently interpreted the patient's cardiac monitor and a chest x-ray.  There is no pulmonary infiltrate and patient is in sinus rhythm.      Additional sources:  Discussed/obtained information from independent historians:   [] Spouse:   [] Parent:   [] Friend:   [x] EMS: Report was taken from EMS, vital signs stable during transport   [] Other:  External (non-ED) record review:   [] Inpatient record:   [] Office record:   [] Outpatient record:   [] Prior Outpatient labs:   [] Prior Outpatient radiology:   [] Primary Care record:   [] Outside ED record:   [] Other:       Patient's care impacted by:   [x] Diabetes   [] Hypertension   [x] Coronary Artery Disease   [] Cancer   [] Other:     Care significantly affected by Social Determinants of Health (housing and economic circumstances, unemployment)    [] Yes     [x] No   If yes, Patient's care significantly limited by  Social Determinants of Health including:    [] Inadequate housing    [] Low income    [] Alcoholism and drug addiction in family    [] Problems related to primary support group    [] Unemployment    [] Problems related to employment    [] Other Social Determinants of Health:       Consideration of  admission/observation vs discharge: Considered observation admission, ever feel that patient's presentation is low risk, workup unremarkable in the ED, patient fully asymptomatic during the course of his stay, feel that he is stable for discharge home with outpatient follow-up.        ED Course:    ED Course as of 07/03/25 1940 Wed Jul 02, 2025   1411 On reevaluation, the patient remains completely asymptomatic.  Cardiac monitor shows sinus rhythm during the course of his stay in the ED.  He got lightheaded while walking out in the heat admits to not drinking much fluids today, suspect this is the culprit along with his hyperglycemia contributing to some dehydration.  He was given IV fluids in the ED.  In no point did he have any chest pain or discomfort, shortness of breath, palpitations, unexplained diaphoresis, or other symptoms that would raise concern for cardiopulmonary emergency contributing to his lightheadedness.  Troponin is within normal limits, no evidence of significant anemia or electrolyte derangement.  Feel that patient is stable for discharge home with outpatient follow-up. [NS]      ED Course User Index  [NS] Cristofer Gar MD           I had a discussion with the patient/family regarding diagnosis, diagnostic results, treatment plan, and medications.  The patient/family indicated understanding of these instructions.  I spent adequate time at the bedside preceding discharge necessary to personally discuss the aftercare instructions, giving patient education, providing explanations of the results of our evaluations/findings, and my decision making to assure that the patient/family understand the plan of care.  Time was allotted to answer questions at that time and throughout the ED course.  Emphasis was placed on timely follow-up after discharge.  I also discussed the potential for the development of an acute emergent condition requiring further evaluation, admission, or even surgical  intervention. I discussed that we found nothing during the visit today indicating the need for further workup, admission, or the presence of an unstable medical condition.  I encouraged the patient to return to the emergency department immediately for ANY concerns, worsening, new complaints, or if symptoms persist and unable to seek follow-up in a timely fashion.  The patient/family expressed understanding and agreement with this plan.  The patient will follow-up with their PCP in 1-2 days for reevaluation.           PROCEDURES:  Procedures    CRITICAL CARE TIME        FINAL IMPRESSION      1. Lightheadedness    2. Hyperglycemia    3. History of coronary artery disease    4. History of CHF (congestive heart failure)          DISPOSITION/PLAN     ED Disposition       ED Disposition   Discharge    Condition   Stable    Comment   --                 Comment: Please note this report has been produced using speech recognition software.      Cristofer Gar MD  Attending Emergency Physician             Cristofer Gar MD  07/03/25 1397

## 2025-07-27 LAB
QT INTERVAL: 390 MS
QTC INTERVAL: 435 MS

## (undated) DEVICE — 12 FOOT DISPOSABLE EXTENSION CABLE WITH SAFE CONNECT / SCREW-DOWN

## (undated) DEVICE — 28 FR RIGHT ANGLE – SOFT PVC CATHETER: Brand: PVC THORACIC CATHETERS

## (undated) DEVICE — ANGIO-SEAL VIP VASCULAR CLOSURE DEVICE: Brand: ANGIO-SEAL

## (undated) DEVICE — Device

## (undated) DEVICE — SUT SILK 0/0 CT2 18IN C027D

## (undated) DEVICE — SUT PROLN 4/0 V5 36IN 8935H

## (undated) DEVICE — CATH DIAG EXPO M/ PK 6FR FL4/FR4 PIG 3PK

## (undated) DEVICE — ANTIBACTERIAL UNDYED BRAIDED (POLYGLACTIN 910), SYNTHETIC ABSORBABLE SUTURE: Brand: COATED VICRYL

## (undated) DEVICE — RESUSCITATOR,MANUAL,ADLT,MASK,TUBE RES: Brand: MEDLINE INDUSTRIES, INC.

## (undated) DEVICE — SUT PROLN 7/0 BV1 D/A 24IN 8702H

## (undated) DEVICE — MEDI-VAC NON-CONDUCTIVE SUCTION TUBING: Brand: CARDINAL HEALTH

## (undated) DEVICE — PRESSURE MONITORING SET: Brand: TRUWAVE, VAMP

## (undated) DEVICE — BNDG ELAS CO-FLEX SLF ADHR 6IN 5YD LF STRL

## (undated) DEVICE — DRAPE,SPLIT,CV,CLR ANES,STERILE: Brand: MEDLINE

## (undated) DEVICE — PAD ARMBRD SURG CONVOL 7.5X20X2IN

## (undated) DEVICE — SUT SILK 2/0 CT1 CR8 18IN C022D

## (undated) DEVICE — CLTH CLENS READYCLEANSE PERI CARE PK/5

## (undated) DEVICE — LUER-LOK 360°: Brand: CONNECTA, LUER-LOK

## (undated) DEVICE — PK CATH CARD 10

## (undated) DEVICE — ENCORE® LATEX MICRO SIZE 7.5, STERILE LATEX POWDER-FREE SURGICAL GLOVE: Brand: ENCORE

## (undated) DEVICE — INTRAOPERATIVE COVER KIT, 10 PACK: Brand: SITE-RITE

## (undated) DEVICE — SUT PROLN 4/0 V7 36IN 8975H

## (undated) DEVICE — BNDG ELAS CO-FLEX SLF ADHR 4IN5YD LF STRL

## (undated) DEVICE — SUT SILK 2 SUTUPAK TIE 60IN SA8H 2STRAND

## (undated) DEVICE — CATHETER,URETHRAL,REDRUBBER,STERILE,22FR: Brand: MEDLINE

## (undated) DEVICE — SUT PROLN 6/0 C1 D/A 30IN 8706H

## (undated) DEVICE — SUT ETHIB 2/0 SH SH 36IN X523H

## (undated) DEVICE — ST BLOOD ADMIN YTP 80IN

## (undated) DEVICE — SUT SILK 4/0 TIES 18IN A183H

## (undated) DEVICE — ST EXT IV SMARTSITE 2VLV SP M LL 5ML IV1

## (undated) DEVICE — BLD SCLPL BEAVR MINI STR 2BVL 180D LF

## (undated) DEVICE — ST INF PRI SMRTSTE 20DRP 2VLV 24ML 117

## (undated) DEVICE — PK HEART OPN 10

## (undated) DEVICE — INTRO SHEATH ART/FEM ENGAGE .038 6F12CM

## (undated) DEVICE — GLV SURG SENSICARE MICRO PF LF 8 STRL

## (undated) DEVICE — 2963 MEDIPORE SOFT CLOTH TAPE 3 IN X 10 YD 12 RLS/CS: Brand: 3M™ MEDIPORE™

## (undated) DEVICE — PRESSURE MONITORING ACCESSORY: Brand: TRUWAVE

## (undated) DEVICE — DR ROGERS OH: Brand: MEDLINE INDUSTRIES, INC.

## (undated) DEVICE — TUBING, SUCTION, 1/4" X 10', STRAIGHT: Brand: MEDLINE

## (undated) DEVICE — SOL NACL 0.9PCT 1000ML

## (undated) DEVICE — MEDI-VAC YANKAUER SUCTION HANDLE W/BULBOUS TIP: Brand: CARDINAL HEALTH

## (undated) DEVICE — KT MANIFOLD CATHLAB CUST

## (undated) DEVICE — GW J TP FIX CORE .035 150

## (undated) DEVICE — INTENDED FOR TISSUE SEPARATION, AND OTHER PROCEDURES THAT REQUIRE A SHARP SURGICAL BLADE TO PUNCTURE OR CUT.: Brand: BARD-PARKER ® STAINLESS STEEL BLADES

## (undated) DEVICE — DRP SLUSH MACH

## (undated) DEVICE — VASOVIEW HEMOPRO: Brand: VASOVIEW HEMOPRO

## (undated) DEVICE — TEMP PACING WIRE: Brand: MYO/WIRE

## (undated) DEVICE — DRSNG WND BORDR/ADHS NONADHR/GZ LF 4X14IN STRL

## (undated) DEVICE — ST INF 10DRP 4 PORT 4WY/STPCOCK 112IN

## (undated) DEVICE — SOL NS 500ML

## (undated) DEVICE — CVR HNDL LT SURG ACCSSRY BLU STRL

## (undated) DEVICE — TRAP,MUCUS SPECIMEN,40CC: Brand: MEDLINE

## (undated) DEVICE — SUCTION CANISTER, 2500CC, RIGID: Brand: DEROYAL

## (undated) DEVICE — ULTRACLEAN ACCESSORY ELECTRODE 6" (15.24 CM) COATED BLADE: Brand: ULTRACLEAN